# Patient Record
Sex: FEMALE | Race: WHITE | NOT HISPANIC OR LATINO | Employment: UNEMPLOYED | URBAN - METROPOLITAN AREA
[De-identification: names, ages, dates, MRNs, and addresses within clinical notes are randomized per-mention and may not be internally consistent; named-entity substitution may affect disease eponyms.]

---

## 2021-08-17 ENCOUNTER — TELEPHONE (OUTPATIENT)
Dept: FAMILY MEDICINE | Facility: CLINIC | Age: 46
End: 2021-08-17

## 2021-09-08 ENCOUNTER — OFFICE VISIT (OUTPATIENT)
Dept: FAMILY MEDICINE | Facility: CLINIC | Age: 46
End: 2021-09-08
Payer: OTHER GOVERNMENT

## 2021-09-08 VITALS
HEART RATE: 89 BPM | HEIGHT: 61 IN | WEIGHT: 151 LBS | SYSTOLIC BLOOD PRESSURE: 136 MMHG | RESPIRATION RATE: 16 BRPM | DIASTOLIC BLOOD PRESSURE: 87 MMHG | BODY MASS INDEX: 28.51 KG/M2 | TEMPERATURE: 98 F | OXYGEN SATURATION: 99 %

## 2021-09-08 DIAGNOSIS — N80.9 ENDOMETRIOSIS: ICD-10-CM

## 2021-09-08 DIAGNOSIS — F33.1 MODERATE EPISODE OF RECURRENT MAJOR DEPRESSIVE DISORDER: ICD-10-CM

## 2021-09-08 DIAGNOSIS — Z11.59 ENCOUNTER FOR HEPATITIS C SCREENING TEST FOR LOW RISK PATIENT: ICD-10-CM

## 2021-09-08 DIAGNOSIS — M41.9 SCOLIOSIS, UNSPECIFIED SCOLIOSIS TYPE, UNSPECIFIED SPINAL REGION: ICD-10-CM

## 2021-09-08 DIAGNOSIS — R53.83 FATIGUE, UNSPECIFIED TYPE: ICD-10-CM

## 2021-09-08 DIAGNOSIS — M25.552 LEFT HIP PAIN: ICD-10-CM

## 2021-09-08 DIAGNOSIS — Z11.4 ENCOUNTER FOR SCREENING FOR HIV: ICD-10-CM

## 2021-09-08 DIAGNOSIS — I10 ESSENTIAL HYPERTENSION: Primary | ICD-10-CM

## 2021-09-08 DIAGNOSIS — Z12.31 ENCOUNTER FOR SCREENING MAMMOGRAM FOR BREAST CANCER: ICD-10-CM

## 2021-09-08 DIAGNOSIS — L30.9 PERIORBITAL DERMATITIS: ICD-10-CM

## 2021-09-08 PROCEDURE — 99213 OFFICE O/P EST LOW 20 MIN: CPT | Mod: PBBFAC,PO | Performed by: INTERNAL MEDICINE

## 2021-09-08 PROCEDURE — 99204 PR OFFICE/OUTPT VISIT, NEW, LEVL IV, 45-59 MIN: ICD-10-PCS | Mod: S$PBB,,, | Performed by: INTERNAL MEDICINE

## 2021-09-08 PROCEDURE — 99999 PR PBB SHADOW E&M-EST. PATIENT-LVL III: CPT | Mod: PBBFAC,,, | Performed by: INTERNAL MEDICINE

## 2021-09-08 PROCEDURE — 99204 OFFICE O/P NEW MOD 45 MIN: CPT | Mod: S$PBB,,, | Performed by: INTERNAL MEDICINE

## 2021-09-08 PROCEDURE — 99999 PR PBB SHADOW E&M-EST. PATIENT-LVL III: ICD-10-PCS | Mod: PBBFAC,,, | Performed by: INTERNAL MEDICINE

## 2021-09-08 RX ORDER — LISINOPRIL 40 MG/1
40 TABLET ORAL DAILY
Qty: 90 TABLET | Refills: 1 | Status: SHIPPED | OUTPATIENT
Start: 2021-09-08 | End: 2021-09-09 | Stop reason: SDUPTHER

## 2021-09-08 RX ORDER — ESCITALOPRAM OXALATE 20 MG/1
20 TABLET ORAL DAILY
Qty: 90 TABLET | Refills: 1 | Status: SHIPPED | OUTPATIENT
Start: 2021-09-08 | End: 2021-09-09 | Stop reason: SDUPTHER

## 2021-09-08 RX ORDER — HYDROCHLOROTHIAZIDE 25 MG/1
TABLET ORAL
COMMUNITY
Start: 2018-09-08 | End: 2021-09-08 | Stop reason: SDUPTHER

## 2021-09-08 RX ORDER — HYDROCHLOROTHIAZIDE 25 MG/1
25 TABLET ORAL DAILY
Qty: 90 TABLET | Refills: 1 | Status: SHIPPED | OUTPATIENT
Start: 2021-09-08 | End: 2021-09-09 | Stop reason: SDUPTHER

## 2021-09-08 RX ORDER — LISINOPRIL 40 MG/1
TABLET ORAL
COMMUNITY
Start: 2010-09-08 | End: 2021-09-08 | Stop reason: SDUPTHER

## 2021-09-09 ENCOUNTER — PATIENT MESSAGE (OUTPATIENT)
Dept: FAMILY MEDICINE | Facility: CLINIC | Age: 46
End: 2021-09-09

## 2021-09-09 ENCOUNTER — HOSPITAL ENCOUNTER (OUTPATIENT)
Dept: RADIOLOGY | Facility: HOSPITAL | Age: 46
Discharge: HOME | End: 2021-09-09
Attending: INTERNAL MEDICINE
Payer: OTHER GOVERNMENT

## 2021-09-09 DIAGNOSIS — M25.552 LEFT HIP PAIN: ICD-10-CM

## 2021-09-09 DIAGNOSIS — I10 ESSENTIAL HYPERTENSION: ICD-10-CM

## 2021-09-09 DIAGNOSIS — F33.1 MODERATE EPISODE OF RECURRENT MAJOR DEPRESSIVE DISORDER: ICD-10-CM

## 2021-09-09 PROCEDURE — 73502 X-RAY EXAM HIP UNI 2-3 VIEWS: CPT | Mod: 26,LT,, | Performed by: RADIOLOGY

## 2021-09-09 PROCEDURE — 73502 XR HIP WITH PELVIS WHEN PERFORMED, 2 OR 3 VIEWS LEFT: ICD-10-PCS | Mod: 26,LT,, | Performed by: RADIOLOGY

## 2021-09-09 PROCEDURE — 73502 X-RAY EXAM HIP UNI 2-3 VIEWS: CPT | Mod: TC,FY,LT

## 2021-09-09 RX ORDER — HYDROCHLOROTHIAZIDE 25 MG/1
25 TABLET ORAL DAILY
Qty: 90 TABLET | Refills: 1 | Status: SHIPPED | OUTPATIENT
Start: 2021-09-09 | End: 2021-12-28

## 2021-09-09 RX ORDER — ESCITALOPRAM OXALATE 20 MG/1
20 TABLET ORAL DAILY
Qty: 90 TABLET | Refills: 1 | Status: SHIPPED | OUTPATIENT
Start: 2021-09-09 | End: 2021-12-28

## 2021-09-09 RX ORDER — LISINOPRIL 40 MG/1
40 TABLET ORAL DAILY
Qty: 90 TABLET | Refills: 1 | Status: SHIPPED | OUTPATIENT
Start: 2021-09-09 | End: 2021-12-28

## 2021-09-10 DIAGNOSIS — R74.01 ELEVATED TRANSAMINASE LEVEL: Primary | ICD-10-CM

## 2021-09-13 ENCOUNTER — PATIENT MESSAGE (OUTPATIENT)
Dept: FAMILY MEDICINE | Facility: CLINIC | Age: 46
End: 2021-09-13

## 2021-09-14 ENCOUNTER — HOSPITAL ENCOUNTER (OUTPATIENT)
Dept: RADIOLOGY | Facility: HOSPITAL | Age: 46
Discharge: HOME | End: 2021-09-14
Attending: INTERNAL MEDICINE
Payer: OTHER GOVERNMENT

## 2021-09-14 DIAGNOSIS — K76.89 HEPATIC CYST: Primary | ICD-10-CM

## 2021-09-14 DIAGNOSIS — R74.01 ELEVATED TRANSAMINASE LEVEL: ICD-10-CM

## 2021-09-14 DIAGNOSIS — K76.9 LIVER DISEASE, UNSPECIFIED: ICD-10-CM

## 2021-09-14 PROBLEM — K76.0 HEPATIC STEATOSIS: Status: ACTIVE | Noted: 2021-09-14

## 2021-09-14 PROCEDURE — 76700 US EXAM ABDOM COMPLETE: CPT | Mod: 26,,, | Performed by: RADIOLOGY

## 2021-09-14 PROCEDURE — 76700 US EXAM ABDOM COMPLETE: CPT | Mod: TC

## 2021-09-14 PROCEDURE — 76700 US ABDOMEN COMPLETE: ICD-10-PCS | Mod: 26,,, | Performed by: RADIOLOGY

## 2021-09-17 ENCOUNTER — PATIENT MESSAGE (OUTPATIENT)
Dept: FAMILY MEDICINE | Facility: CLINIC | Age: 46
End: 2021-09-17

## 2021-09-21 DIAGNOSIS — K76.89 HEPATIC CYST: ICD-10-CM

## 2021-09-21 DIAGNOSIS — K76.0 HEPATIC STEATOSIS: ICD-10-CM

## 2021-09-21 DIAGNOSIS — R76.8 POSITIVE ANA (ANTINUCLEAR ANTIBODY): ICD-10-CM

## 2021-09-21 DIAGNOSIS — R74.01 ELEVATED TRANSAMINASE LEVEL: Primary | ICD-10-CM

## 2021-09-23 ENCOUNTER — HOSPITAL ENCOUNTER (OUTPATIENT)
Dept: RADIOLOGY | Facility: HOSPITAL | Age: 46
Discharge: HOME | End: 2021-09-23
Attending: INTERNAL MEDICINE
Payer: OTHER GOVERNMENT

## 2021-09-23 DIAGNOSIS — K76.9 LIVER DISEASE, UNSPECIFIED: ICD-10-CM

## 2021-09-23 DIAGNOSIS — K76.89 HEPATIC CYST: ICD-10-CM

## 2021-09-23 PROCEDURE — 25500020 PHARM REV CODE 255: Performed by: INTERNAL MEDICINE

## 2021-09-23 PROCEDURE — 74160 CT ABDOMEN W/CONTRAST: CPT | Mod: TC

## 2021-09-23 PROCEDURE — 74160 CT ABDOMEN W/CONTRAST: CPT | Mod: 26,,, | Performed by: RADIOLOGY

## 2021-09-23 PROCEDURE — 74160 CT ABDOMEN WITH CONTRAST: ICD-10-PCS | Mod: 26,,, | Performed by: RADIOLOGY

## 2021-09-23 RX ADMIN — IOHEXOL 75 ML: 350 INJECTION, SOLUTION INTRAVENOUS at 04:09

## 2021-09-30 ENCOUNTER — PATIENT MESSAGE (OUTPATIENT)
Dept: FAMILY MEDICINE | Facility: CLINIC | Age: 46
End: 2021-09-30

## 2021-10-04 ENCOUNTER — PATIENT MESSAGE (OUTPATIENT)
Dept: ADMINISTRATIVE | Facility: HOSPITAL | Age: 46
End: 2021-10-04

## 2021-10-06 ENCOUNTER — HOSPITAL ENCOUNTER (OUTPATIENT)
Dept: RADIOLOGY | Facility: HOSPITAL | Age: 46
Discharge: HOME | End: 2021-10-06
Attending: INTERNAL MEDICINE
Payer: OTHER GOVERNMENT

## 2021-10-06 VITALS — BODY MASS INDEX: 28.51 KG/M2 | WEIGHT: 151 LBS | HEIGHT: 61 IN

## 2021-10-06 DIAGNOSIS — Z12.31 ENCOUNTER FOR SCREENING MAMMOGRAM FOR BREAST CANCER: ICD-10-CM

## 2021-10-06 PROCEDURE — 77067 SCR MAMMO BI INCL CAD: CPT | Mod: TC

## 2021-10-06 PROCEDURE — 77067 MAMMO DIGITAL SCREENING BILAT: ICD-10-PCS | Mod: 26,,, | Performed by: RADIOLOGY

## 2021-10-06 PROCEDURE — 77067 SCR MAMMO BI INCL CAD: CPT | Mod: 26,,, | Performed by: RADIOLOGY

## 2021-10-13 ENCOUNTER — TELEPHONE (OUTPATIENT)
Dept: GASTROENTEROLOGY | Facility: CLINIC | Age: 46
End: 2021-10-13

## 2021-11-15 ENCOUNTER — LAB VISIT (OUTPATIENT)
Dept: LAB | Facility: HOSPITAL | Age: 46
End: 2021-11-15
Attending: INTERNAL MEDICINE
Payer: OTHER GOVERNMENT

## 2021-11-15 ENCOUNTER — OFFICE VISIT (OUTPATIENT)
Dept: GASTROENTEROLOGY | Facility: CLINIC | Age: 46
End: 2021-11-15
Payer: OTHER GOVERNMENT

## 2021-11-15 ENCOUNTER — TELEPHONE (OUTPATIENT)
Dept: GASTROENTEROLOGY | Facility: CLINIC | Age: 46
End: 2021-11-15
Payer: OTHER GOVERNMENT

## 2021-11-15 VITALS — WEIGHT: 151 LBS | BODY MASS INDEX: 28.51 KG/M2 | HEIGHT: 61 IN

## 2021-11-15 DIAGNOSIS — R76.8 POSITIVE ANA (ANTINUCLEAR ANTIBODY): ICD-10-CM

## 2021-11-15 DIAGNOSIS — R74.01 ELEVATED TRANSAMINASE LEVEL: ICD-10-CM

## 2021-11-15 DIAGNOSIS — K76.89 HEPATIC CYST: ICD-10-CM

## 2021-11-15 LAB
IGA SERPL-MCNC: 168 MG/DL (ref 40–350)
IGG SERPL-MCNC: 968 MG/DL (ref 650–1600)
IGM SERPL-MCNC: 138 MG/DL (ref 50–300)
IRON SERPL-MCNC: 78 UG/DL (ref 30–160)
SATURATED IRON: 21 % (ref 20–50)
TOTAL IRON BINDING CAPACITY: 373 UG/DL (ref 250–450)
TRANSFERRIN SERPL-MCNC: 252 MG/DL (ref 200–375)

## 2021-11-15 PROCEDURE — 82784 ASSAY IGA/IGD/IGG/IGM EACH: CPT | Performed by: INTERNAL MEDICINE

## 2021-11-15 PROCEDURE — 99203 OFFICE O/P NEW LOW 30 MIN: CPT | Mod: S$PBB,,, | Performed by: INTERNAL MEDICINE

## 2021-11-15 PROCEDURE — 36415 COLL VENOUS BLD VENIPUNCTURE: CPT | Performed by: INTERNAL MEDICINE

## 2021-11-15 PROCEDURE — 84466 ASSAY OF TRANSFERRIN: CPT | Performed by: INTERNAL MEDICINE

## 2021-11-15 PROCEDURE — 99203 PR OFFICE/OUTPT VISIT, NEW, LEVL III, 30-44 MIN: ICD-10-PCS | Mod: S$PBB,,, | Performed by: INTERNAL MEDICINE

## 2021-11-15 PROCEDURE — 99999 PR PBB SHADOW E&M-EST. PATIENT-LVL III: ICD-10-PCS | Mod: PBBFAC,,, | Performed by: INTERNAL MEDICINE

## 2021-11-15 PROCEDURE — 99213 OFFICE O/P EST LOW 20 MIN: CPT | Mod: PBBFAC,PO | Performed by: INTERNAL MEDICINE

## 2021-11-15 PROCEDURE — 82787 IGG 1 2 3 OR 4 EACH: CPT | Mod: 59 | Performed by: INTERNAL MEDICINE

## 2021-11-15 PROCEDURE — 99999 PR PBB SHADOW E&M-EST. PATIENT-LVL III: CPT | Mod: PBBFAC,,, | Performed by: INTERNAL MEDICINE

## 2021-11-15 PROCEDURE — 86790 VIRUS ANTIBODY NOS: CPT | Performed by: INTERNAL MEDICINE

## 2021-11-15 PROCEDURE — 86255 FLUORESCENT ANTIBODY SCREEN: CPT | Mod: 91 | Performed by: INTERNAL MEDICINE

## 2021-11-15 PROCEDURE — 82103 ALPHA-1-ANTITRYPSIN TOTAL: CPT | Performed by: INTERNAL MEDICINE

## 2021-11-15 PROCEDURE — 86235 NUCLEAR ANTIGEN ANTIBODY: CPT | Performed by: INTERNAL MEDICINE

## 2021-11-16 LAB
A1AT SERPL-MCNC: 151 MG/DL (ref 100–190)
HEPATITIS A ANTIBODY, IGG: NEGATIVE

## 2021-11-17 ENCOUNTER — OFFICE VISIT (OUTPATIENT)
Dept: RHEUMATOLOGY | Facility: CLINIC | Age: 46
End: 2021-11-17
Payer: OTHER GOVERNMENT

## 2021-11-17 VITALS
WEIGHT: 153.25 LBS | OXYGEN SATURATION: 98 % | BODY MASS INDEX: 28.93 KG/M2 | SYSTOLIC BLOOD PRESSURE: 131 MMHG | HEART RATE: 70 BPM | DIASTOLIC BLOOD PRESSURE: 79 MMHG | TEMPERATURE: 99 F | HEIGHT: 61 IN | RESPIRATION RATE: 18 BRPM

## 2021-11-17 DIAGNOSIS — R76.8 POSITIVE ANA (ANTINUCLEAR ANTIBODY): ICD-10-CM

## 2021-11-17 DIAGNOSIS — Z71.89 COUNSELING AND COORDINATION OF CARE: ICD-10-CM

## 2021-11-17 DIAGNOSIS — K75.4 AUTOIMMUNE HEPATITIS: Primary | ICD-10-CM

## 2021-11-17 DIAGNOSIS — Z79.52 LONG TERM SYSTEMIC STEROID USER: ICD-10-CM

## 2021-11-17 DIAGNOSIS — M15.9 PRIMARY OSTEOARTHRITIS INVOLVING MULTIPLE JOINTS: ICD-10-CM

## 2021-11-17 LAB — MITOCHONDRIA AB TITR SER IF: NORMAL {TITER}

## 2021-11-17 PROCEDURE — 99213 OFFICE O/P EST LOW 20 MIN: CPT | Mod: PBBFAC,PN | Performed by: INTERNAL MEDICINE

## 2021-11-17 PROCEDURE — 99204 PR OFFICE/OUTPT VISIT, NEW, LEVL IV, 45-59 MIN: ICD-10-PCS | Mod: S$PBB,,, | Performed by: INTERNAL MEDICINE

## 2021-11-17 PROCEDURE — 99999 PR PBB SHADOW E&M-EST. PATIENT-LVL III: CPT | Mod: PBBFAC,,, | Performed by: INTERNAL MEDICINE

## 2021-11-17 PROCEDURE — 99204 OFFICE O/P NEW MOD 45 MIN: CPT | Mod: S$PBB,,, | Performed by: INTERNAL MEDICINE

## 2021-11-17 PROCEDURE — 99999 PR PBB SHADOW E&M-EST. PATIENT-LVL III: ICD-10-PCS | Mod: PBBFAC,,, | Performed by: INTERNAL MEDICINE

## 2021-11-17 RX ORDER — PANTOPRAZOLE SODIUM 40 MG/1
40 TABLET, DELAYED RELEASE ORAL DAILY
Qty: 30 TABLET | Refills: 11 | Status: SHIPPED | OUTPATIENT
Start: 2021-11-17 | End: 2021-12-14 | Stop reason: SDUPTHER

## 2021-11-17 RX ORDER — PREDNISONE 20 MG/1
20 TABLET ORAL DAILY
Qty: 30 TABLET | Refills: 3 | Status: SHIPPED | OUTPATIENT
Start: 2021-11-17 | End: 2021-12-14 | Stop reason: SDUPTHER

## 2021-11-17 RX ORDER — MULTIVITAMIN
1 TABLET ORAL 2 TIMES DAILY
Qty: 60 TABLET | Refills: 3 | Status: SHIPPED | OUTPATIENT
Start: 2021-11-17 | End: 2021-12-14 | Stop reason: SDUPTHER

## 2021-11-17 RX ORDER — SULFAMETHOXAZOLE AND TRIMETHOPRIM 800; 160 MG/1; MG/1
1 TABLET ORAL EVERY OTHER DAY
Qty: 15 TABLET | Refills: 3 | Status: SHIPPED | OUTPATIENT
Start: 2021-11-17 | End: 2021-12-14 | Stop reason: SDUPTHER

## 2021-11-18 LAB
ANCA AB TITR SER IF: NORMAL TITER
IGG1 SER-MCNC: 494 MG/DL (ref 382–929)
IGG2 SER-MCNC: 344 MG/DL (ref 242–700)
IGG3 SER-MCNC: 31 MG/DL (ref 22–176)
IGG4 SER-MCNC: 3 MG/DL (ref 4–86)
P-ANCA TITR SER IF: NORMAL TITER

## 2021-11-22 ENCOUNTER — PATIENT MESSAGE (OUTPATIENT)
Dept: GASTROENTEROLOGY | Facility: HOSPITAL | Age: 46
End: 2021-11-22
Payer: OTHER GOVERNMENT

## 2021-11-22 DIAGNOSIS — R74.01 ELEVATED TRANSAMINASE LEVEL: Primary | ICD-10-CM

## 2021-11-24 ENCOUNTER — PATIENT MESSAGE (OUTPATIENT)
Dept: RHEUMATOLOGY | Facility: CLINIC | Age: 46
End: 2021-11-24
Payer: OTHER GOVERNMENT

## 2021-12-01 ENCOUNTER — OFFICE VISIT (OUTPATIENT)
Dept: URGENT CARE | Facility: CLINIC | Age: 46
End: 2021-12-01
Payer: OTHER GOVERNMENT

## 2021-12-01 VITALS
TEMPERATURE: 100 F | SYSTOLIC BLOOD PRESSURE: 146 MMHG | DIASTOLIC BLOOD PRESSURE: 86 MMHG | HEIGHT: 61 IN | OXYGEN SATURATION: 99 % | RESPIRATION RATE: 16 BRPM | HEART RATE: 99 BPM | WEIGHT: 150 LBS | BODY MASS INDEX: 28.32 KG/M2

## 2021-12-01 DIAGNOSIS — Z20.822 ENCOUNTER FOR LABORATORY TESTING FOR COVID-19 VIRUS: ICD-10-CM

## 2021-12-01 DIAGNOSIS — U07.1 COVID-19: Primary | ICD-10-CM

## 2021-12-01 LAB
CTP QC/QA: YES
SARS-COV-2 RDRP RESP QL NAA+PROBE: POSITIVE

## 2021-12-01 PROCEDURE — U0002: ICD-10-PCS | Mod: QW,CR,S$GLB,

## 2021-12-01 PROCEDURE — 99213 PR OFFICE/OUTPT VISIT, EST, LEVL III, 20-29 MIN: ICD-10-PCS | Mod: S$GLB,CS,,

## 2021-12-01 PROCEDURE — U0002 COVID-19 LAB TEST NON-CDC: HCPCS | Mod: QW,CR,S$GLB,

## 2021-12-01 PROCEDURE — 99213 OFFICE O/P EST LOW 20 MIN: CPT | Mod: S$GLB,CS,,

## 2021-12-01 RX ORDER — ALBUTEROL SULFATE 90 UG/1
2 AEROSOL, METERED RESPIRATORY (INHALATION) EVERY 6 HOURS PRN
Qty: 18 G | Refills: 0 | Status: SHIPPED | OUTPATIENT
Start: 2021-12-01 | End: 2021-12-01 | Stop reason: CLARIF

## 2021-12-01 RX ORDER — PROMETHAZINE HYDROCHLORIDE AND DEXTROMETHORPHAN HYDROBROMIDE 6.25; 15 MG/5ML; MG/5ML
5 SYRUP ORAL NIGHTLY PRN
Qty: 180 ML | Refills: 0 | Status: SHIPPED | OUTPATIENT
Start: 2021-12-01 | End: 2021-12-11

## 2021-12-01 RX ORDER — BENZONATATE 100 MG/1
200 CAPSULE ORAL 3 TIMES DAILY PRN
Qty: 60 CAPSULE | Refills: 1 | Status: SHIPPED | OUTPATIENT
Start: 2021-12-01 | End: 2021-12-20

## 2021-12-02 ENCOUNTER — INFUSION (OUTPATIENT)
Dept: INFECTIOUS DISEASES | Facility: HOSPITAL | Age: 46
End: 2021-12-02
Payer: OTHER GOVERNMENT

## 2021-12-02 VITALS
DIASTOLIC BLOOD PRESSURE: 72 MMHG | HEART RATE: 88 BPM | OXYGEN SATURATION: 99 % | HEIGHT: 61 IN | TEMPERATURE: 98 F | WEIGHT: 150 LBS | SYSTOLIC BLOOD PRESSURE: 116 MMHG | BODY MASS INDEX: 28.32 KG/M2 | RESPIRATION RATE: 16 BRPM

## 2021-12-02 DIAGNOSIS — U07.1 COVID-19: Primary | ICD-10-CM

## 2021-12-02 PROCEDURE — 25000003 PHARM REV CODE 250: Performed by: INTERNAL MEDICINE

## 2021-12-02 PROCEDURE — 63600175 PHARM REV CODE 636 W HCPCS: Performed by: INTERNAL MEDICINE

## 2021-12-02 PROCEDURE — M0243 CASIRIVI AND IMDEVI INFUSION: HCPCS | Performed by: INTERNAL MEDICINE

## 2021-12-02 RX ORDER — ACETAMINOPHEN 325 MG/1
650 TABLET ORAL ONCE AS NEEDED
Status: DISCONTINUED | OUTPATIENT
Start: 2021-12-02 | End: 2022-04-12

## 2021-12-02 RX ORDER — SODIUM CHLORIDE 0.9 % (FLUSH) 0.9 %
10 SYRINGE (ML) INJECTION
Status: DISCONTINUED | OUTPATIENT
Start: 2021-12-02 | End: 2022-04-12

## 2021-12-02 RX ORDER — ALBUTEROL SULFATE 90 UG/1
2 AEROSOL, METERED RESPIRATORY (INHALATION)
Status: DISCONTINUED | OUTPATIENT
Start: 2021-12-02 | End: 2022-04-12

## 2021-12-02 RX ORDER — DIPHENHYDRAMINE HYDROCHLORIDE 50 MG/ML
25 INJECTION INTRAMUSCULAR; INTRAVENOUS ONCE AS NEEDED
Status: DISCONTINUED | OUTPATIENT
Start: 2021-12-02 | End: 2022-04-12

## 2021-12-02 RX ORDER — ONDANSETRON 4 MG/1
4 TABLET, ORALLY DISINTEGRATING ORAL ONCE AS NEEDED
Status: DISCONTINUED | OUTPATIENT
Start: 2021-12-02 | End: 2022-04-12

## 2021-12-02 RX ORDER — EPINEPHRINE 0.3 MG/.3ML
0.3 INJECTION SUBCUTANEOUS
Status: DISCONTINUED | OUTPATIENT
Start: 2021-12-02 | End: 2022-04-12

## 2021-12-02 RX ADMIN — CASIRIVIMAB AND IMDEVIMAB 600 MG: 600; 600 INJECTION, SOLUTION, CONCENTRATE INTRAVENOUS at 01:12

## 2021-12-14 ENCOUNTER — OFFICE VISIT (OUTPATIENT)
Dept: RHEUMATOLOGY | Facility: CLINIC | Age: 46
End: 2021-12-14
Payer: OTHER GOVERNMENT

## 2021-12-14 ENCOUNTER — LAB VISIT (OUTPATIENT)
Dept: LAB | Facility: HOSPITAL | Age: 46
End: 2021-12-14
Attending: INTERNAL MEDICINE
Payer: OTHER GOVERNMENT

## 2021-12-14 VITALS
WEIGHT: 149.94 LBS | HEART RATE: 80 BPM | RESPIRATION RATE: 18 BRPM | TEMPERATURE: 99 F | BODY MASS INDEX: 28.31 KG/M2 | OXYGEN SATURATION: 97 % | DIASTOLIC BLOOD PRESSURE: 82 MMHG | HEIGHT: 61 IN | SYSTOLIC BLOOD PRESSURE: 123 MMHG

## 2021-12-14 DIAGNOSIS — K75.4 AUTOIMMUNE HEPATITIS TREATED WITH STEROIDS: ICD-10-CM

## 2021-12-14 DIAGNOSIS — K75.4 AUTOIMMUNE HEPATITIS TREATED WITH STEROIDS: Primary | ICD-10-CM

## 2021-12-14 DIAGNOSIS — Z79.52 LONG TERM SYSTEMIC STEROID USER: ICD-10-CM

## 2021-12-14 DIAGNOSIS — Z71.89 COUNSELING AND COORDINATION OF CARE: ICD-10-CM

## 2021-12-14 DIAGNOSIS — R76.8 POSITIVE ANA (ANTINUCLEAR ANTIBODY): ICD-10-CM

## 2021-12-14 DIAGNOSIS — M15.9 PRIMARY OSTEOARTHRITIS INVOLVING MULTIPLE JOINTS: ICD-10-CM

## 2021-12-14 LAB
ALBUMIN SERPL BCP-MCNC: 4.2 G/DL (ref 3.5–5.2)
ALP SERPL-CCNC: 48 U/L (ref 55–135)
ALT SERPL W/O P-5'-P-CCNC: 21 U/L (ref 10–44)
ANION GAP SERPL CALC-SCNC: 9 MMOL/L (ref 8–16)
AST SERPL-CCNC: 19 U/L (ref 10–40)
BASOPHILS # BLD AUTO: 0.04 K/UL (ref 0–0.2)
BASOPHILS NFR BLD: 0.4 % (ref 0–1.9)
BILIRUB SERPL-MCNC: 0.5 MG/DL (ref 0.1–1)
BUN SERPL-MCNC: 15 MG/DL (ref 6–20)
CALCIUM SERPL-MCNC: 9.7 MG/DL (ref 8.7–10.5)
CHLORIDE SERPL-SCNC: 102 MMOL/L (ref 95–110)
CO2 SERPL-SCNC: 28 MMOL/L (ref 23–29)
CREAT SERPL-MCNC: 0.8 MG/DL (ref 0.5–1.4)
DIFFERENTIAL METHOD: ABNORMAL
EOSINOPHIL # BLD AUTO: 0 K/UL (ref 0–0.5)
EOSINOPHIL NFR BLD: 0.2 % (ref 0–8)
ERYTHROCYTE [DISTWIDTH] IN BLOOD BY AUTOMATED COUNT: 13.7 % (ref 11.5–14.5)
EST. GFR  (AFRICAN AMERICAN): >60 ML/MIN/1.73 M^2
EST. GFR  (NON AFRICAN AMERICAN): >60 ML/MIN/1.73 M^2
GLUCOSE SERPL-MCNC: 106 MG/DL (ref 70–110)
HCT VFR BLD AUTO: 38.1 % (ref 37–48.5)
HGB BLD-MCNC: 12.3 G/DL (ref 12–16)
IMM GRANULOCYTES # BLD AUTO: 0.05 K/UL (ref 0–0.04)
IMM GRANULOCYTES NFR BLD AUTO: 0.5 % (ref 0–0.5)
LYMPHOCYTES # BLD AUTO: 1.3 K/UL (ref 1–4.8)
LYMPHOCYTES NFR BLD: 14.3 % (ref 18–48)
MCH RBC QN AUTO: 29.3 PG (ref 27–31)
MCHC RBC AUTO-ENTMCNC: 32.3 G/DL (ref 32–36)
MCV RBC AUTO: 91 FL (ref 82–98)
MONOCYTES # BLD AUTO: 0.4 K/UL (ref 0.3–1)
MONOCYTES NFR BLD: 4.6 % (ref 4–15)
NEUTROPHILS # BLD AUTO: 7.4 K/UL (ref 1.8–7.7)
NEUTROPHILS NFR BLD: 80 % (ref 38–73)
NRBC BLD-RTO: 0 /100 WBC
PLATELET # BLD AUTO: 284 K/UL (ref 150–450)
PMV BLD AUTO: 9.9 FL (ref 9.2–12.9)
POTASSIUM SERPL-SCNC: 4.5 MMOL/L (ref 3.5–5.1)
PROT SERPL-MCNC: 7 G/DL (ref 6–8.4)
RBC # BLD AUTO: 4.2 M/UL (ref 4–5.4)
SODIUM SERPL-SCNC: 139 MMOL/L (ref 136–145)
WBC # BLD AUTO: 9.27 K/UL (ref 3.9–12.7)

## 2021-12-14 PROCEDURE — 99999 PR PBB SHADOW E&M-EST. PATIENT-LVL III: ICD-10-PCS | Mod: PBBFAC,,, | Performed by: INTERNAL MEDICINE

## 2021-12-14 PROCEDURE — 99214 PR OFFICE/OUTPT VISIT, EST, LEVL IV, 30-39 MIN: ICD-10-PCS | Mod: S$PBB,,, | Performed by: INTERNAL MEDICINE

## 2021-12-14 PROCEDURE — 80053 COMPREHEN METABOLIC PANEL: CPT | Performed by: INTERNAL MEDICINE

## 2021-12-14 PROCEDURE — 99999 PR PBB SHADOW E&M-EST. PATIENT-LVL III: CPT | Mod: PBBFAC,,, | Performed by: INTERNAL MEDICINE

## 2021-12-14 PROCEDURE — 85025 COMPLETE CBC W/AUTO DIFF WBC: CPT | Performed by: INTERNAL MEDICINE

## 2021-12-14 PROCEDURE — 36415 COLL VENOUS BLD VENIPUNCTURE: CPT | Mod: PN | Performed by: INTERNAL MEDICINE

## 2021-12-14 PROCEDURE — 99214 OFFICE O/P EST MOD 30 MIN: CPT | Mod: S$PBB,,, | Performed by: INTERNAL MEDICINE

## 2021-12-14 PROCEDURE — 99213 OFFICE O/P EST LOW 20 MIN: CPT | Mod: PBBFAC,PN | Performed by: INTERNAL MEDICINE

## 2021-12-14 RX ORDER — MULTIVITAMIN
1 TABLET ORAL 2 TIMES DAILY
Qty: 60 TABLET | Refills: 3 | Status: SHIPPED | OUTPATIENT
Start: 2021-12-14 | End: 2023-04-12

## 2021-12-14 RX ORDER — PREDNISONE 20 MG/1
20 TABLET ORAL DAILY
Qty: 30 TABLET | Refills: 3 | Status: SHIPPED | OUTPATIENT
Start: 2021-12-14 | End: 2021-12-30

## 2021-12-14 RX ORDER — PANTOPRAZOLE SODIUM 40 MG/1
40 TABLET, DELAYED RELEASE ORAL DAILY
Qty: 30 TABLET | Refills: 11 | Status: SHIPPED | OUTPATIENT
Start: 2021-12-14 | End: 2022-04-12

## 2021-12-14 RX ORDER — SULFAMETHOXAZOLE AND TRIMETHOPRIM 800; 160 MG/1; MG/1
1 TABLET ORAL EVERY OTHER DAY
Qty: 15 TABLET | Refills: 3 | Status: SHIPPED | OUTPATIENT
Start: 2021-12-14 | End: 2022-04-12 | Stop reason: ALTCHOICE

## 2021-12-15 ENCOUNTER — PATIENT MESSAGE (OUTPATIENT)
Dept: RHEUMATOLOGY | Facility: CLINIC | Age: 46
End: 2021-12-15
Payer: OTHER GOVERNMENT

## 2021-12-20 ENCOUNTER — OFFICE VISIT (OUTPATIENT)
Dept: HEPATOLOGY | Facility: CLINIC | Age: 46
End: 2021-12-20
Payer: OTHER GOVERNMENT

## 2021-12-20 VITALS
HEIGHT: 61 IN | RESPIRATION RATE: 18 BRPM | DIASTOLIC BLOOD PRESSURE: 73 MMHG | TEMPERATURE: 98 F | WEIGHT: 155.44 LBS | BODY MASS INDEX: 29.35 KG/M2 | SYSTOLIC BLOOD PRESSURE: 131 MMHG | HEART RATE: 102 BPM | OXYGEN SATURATION: 100 %

## 2021-12-20 DIAGNOSIS — K76.0 FATTY LIVER: ICD-10-CM

## 2021-12-20 DIAGNOSIS — R74.01 ELEVATED TRANSAMINASE LEVEL: Primary | ICD-10-CM

## 2021-12-20 DIAGNOSIS — K76.89 HEPATIC CYST: ICD-10-CM

## 2021-12-20 PROCEDURE — 99999 PR PBB SHADOW E&M-EST. PATIENT-LVL IV: CPT | Mod: PBBFAC,,, | Performed by: NURSE PRACTITIONER

## 2021-12-20 PROCEDURE — 99999 PR PBB SHADOW E&M-EST. PATIENT-LVL IV: ICD-10-PCS | Mod: PBBFAC,,, | Performed by: NURSE PRACTITIONER

## 2021-12-20 PROCEDURE — 99215 PR OFFICE/OUTPT VISIT, EST, LEVL V, 40-54 MIN: ICD-10-PCS | Mod: S$PBB,,, | Performed by: NURSE PRACTITIONER

## 2021-12-20 PROCEDURE — 99214 OFFICE O/P EST MOD 30 MIN: CPT | Mod: PBBFAC | Performed by: NURSE PRACTITIONER

## 2021-12-20 PROCEDURE — 99215 OFFICE O/P EST HI 40 MIN: CPT | Mod: S$PBB,,, | Performed by: NURSE PRACTITIONER

## 2021-12-30 ENCOUNTER — PATIENT MESSAGE (OUTPATIENT)
Dept: HEPATOLOGY | Facility: CLINIC | Age: 46
End: 2021-12-30
Payer: OTHER GOVERNMENT

## 2021-12-30 ENCOUNTER — TELEPHONE (OUTPATIENT)
Dept: HEPATOLOGY | Facility: CLINIC | Age: 46
End: 2021-12-30
Payer: OTHER GOVERNMENT

## 2021-12-30 DIAGNOSIS — R76.8 POSITIVE ANA (ANTINUCLEAR ANTIBODY): ICD-10-CM

## 2021-12-30 DIAGNOSIS — R74.8 ELEVATED LIVER ENZYMES: Primary | ICD-10-CM

## 2021-12-30 RX ORDER — PREDNISONE 20 MG/1
10 TABLET ORAL DAILY
Start: 2021-12-30 | End: 2022-01-10 | Stop reason: DRUGHIGH

## 2022-01-07 ENCOUNTER — LAB VISIT (OUTPATIENT)
Dept: LAB | Facility: HOSPITAL | Age: 47
End: 2022-01-07
Attending: NURSE PRACTITIONER
Payer: OTHER GOVERNMENT

## 2022-01-07 ENCOUNTER — PATIENT MESSAGE (OUTPATIENT)
Dept: FAMILY MEDICINE | Facility: CLINIC | Age: 47
End: 2022-01-07
Payer: OTHER GOVERNMENT

## 2022-01-07 DIAGNOSIS — R74.8 ELEVATED LIVER ENZYMES: ICD-10-CM

## 2022-01-07 LAB
ALBUMIN SERPL BCP-MCNC: 4 G/DL (ref 3.5–5.2)
ALP SERPL-CCNC: 47 U/L (ref 55–135)
ALT SERPL W/O P-5'-P-CCNC: 24 U/L (ref 10–44)
ANION GAP SERPL CALC-SCNC: 10 MMOL/L (ref 8–16)
AST SERPL-CCNC: 20 U/L (ref 10–40)
BASOPHILS # BLD AUTO: 0.04 K/UL (ref 0–0.2)
BASOPHILS NFR BLD: 0.6 % (ref 0–1.9)
BILIRUB SERPL-MCNC: 0.3 MG/DL (ref 0.1–1)
BUN SERPL-MCNC: 14 MG/DL (ref 6–20)
CALCIUM SERPL-MCNC: 9.5 MG/DL (ref 8.7–10.5)
CHLORIDE SERPL-SCNC: 104 MMOL/L (ref 95–110)
CO2 SERPL-SCNC: 28 MMOL/L (ref 23–29)
CREAT SERPL-MCNC: 0.9 MG/DL (ref 0.5–1.4)
DIFFERENTIAL METHOD: ABNORMAL
EOSINOPHIL # BLD AUTO: 0.1 K/UL (ref 0–0.5)
EOSINOPHIL NFR BLD: 2 % (ref 0–8)
ERYTHROCYTE [DISTWIDTH] IN BLOOD BY AUTOMATED COUNT: 14.6 % (ref 11.5–14.5)
EST. GFR  (AFRICAN AMERICAN): >60 ML/MIN/1.73 M^2
EST. GFR  (NON AFRICAN AMERICAN): >60 ML/MIN/1.73 M^2
GLUCOSE SERPL-MCNC: 90 MG/DL (ref 70–110)
HCT VFR BLD AUTO: 35.8 % (ref 37–48.5)
HGB BLD-MCNC: 11.4 G/DL (ref 12–16)
IMM GRANULOCYTES # BLD AUTO: 0.07 K/UL (ref 0–0.04)
IMM GRANULOCYTES NFR BLD AUTO: 1 % (ref 0–0.5)
INR PPP: 1 (ref 0.8–1.2)
LYMPHOCYTES # BLD AUTO: 2.6 K/UL (ref 1–4.8)
LYMPHOCYTES NFR BLD: 37 % (ref 18–48)
MCH RBC QN AUTO: 30.6 PG (ref 27–31)
MCHC RBC AUTO-ENTMCNC: 31.8 G/DL (ref 32–36)
MCV RBC AUTO: 96 FL (ref 82–98)
MONOCYTES # BLD AUTO: 0.7 K/UL (ref 0.3–1)
MONOCYTES NFR BLD: 9.4 % (ref 4–15)
NEUTROPHILS # BLD AUTO: 3.4 K/UL (ref 1.8–7.7)
NEUTROPHILS NFR BLD: 50 % (ref 38–73)
NRBC BLD-RTO: 0 /100 WBC
PLATELET # BLD AUTO: 218 K/UL (ref 150–450)
PMV BLD AUTO: 9.9 FL (ref 9.2–12.9)
POTASSIUM SERPL-SCNC: 3.7 MMOL/L (ref 3.5–5.1)
PROT SERPL-MCNC: 6.6 G/DL (ref 6–8.4)
PROTHROMBIN TIME: 10.3 SEC (ref 9–12.5)
RBC # BLD AUTO: 3.73 M/UL (ref 4–5.4)
SODIUM SERPL-SCNC: 142 MMOL/L (ref 136–145)
WBC # BLD AUTO: 6.89 K/UL (ref 3.9–12.7)

## 2022-01-07 PROCEDURE — 85610 PROTHROMBIN TIME: CPT | Performed by: NURSE PRACTITIONER

## 2022-01-07 PROCEDURE — 80053 COMPREHEN METABOLIC PANEL: CPT | Performed by: NURSE PRACTITIONER

## 2022-01-07 PROCEDURE — 36415 COLL VENOUS BLD VENIPUNCTURE: CPT | Mod: PN | Performed by: NURSE PRACTITIONER

## 2022-01-07 PROCEDURE — 85025 COMPLETE CBC W/AUTO DIFF WBC: CPT | Performed by: NURSE PRACTITIONER

## 2022-01-10 ENCOUNTER — PATIENT MESSAGE (OUTPATIENT)
Dept: HEPATOLOGY | Facility: CLINIC | Age: 47
End: 2022-01-10
Payer: OTHER GOVERNMENT

## 2022-01-10 ENCOUNTER — TELEPHONE (OUTPATIENT)
Dept: HEPATOLOGY | Facility: CLINIC | Age: 47
End: 2022-01-10
Payer: OTHER GOVERNMENT

## 2022-01-10 DIAGNOSIS — R74.8 ELEVATED LIVER ENZYMES: Primary | ICD-10-CM

## 2022-01-10 RX ORDER — PREDNISONE 5 MG/1
5 TABLET ORAL DAILY
Qty: 30 TABLET | Refills: 1 | Status: SHIPPED | OUTPATIENT
Start: 2022-01-10 | End: 2022-04-12 | Stop reason: ALTCHOICE

## 2022-01-10 NOTE — TELEPHONE ENCOUNTER
Patient notified of results and recommendations via [facility] portal.     Liver enzymes remain normal, will decrease prednisone from 10 mg to 5 mg daily. New Rx sent. Repeating labs in 2 weeks.

## 2022-01-11 ENCOUNTER — PATIENT MESSAGE (OUTPATIENT)
Dept: FAMILY MEDICINE | Facility: CLINIC | Age: 47
End: 2022-01-11
Payer: OTHER GOVERNMENT

## 2022-01-11 DIAGNOSIS — B37.9 YEAST INFECTION: Primary | ICD-10-CM

## 2022-01-11 RX ORDER — FLUCONAZOLE 150 MG/1
150 TABLET ORAL DAILY
Qty: 1 TABLET | Refills: 0 | Status: SHIPPED | OUTPATIENT
Start: 2022-01-11 | End: 2022-01-12

## 2022-01-25 ENCOUNTER — LAB VISIT (OUTPATIENT)
Dept: LAB | Facility: HOSPITAL | Age: 47
End: 2022-01-25
Attending: NURSE PRACTITIONER
Payer: OTHER GOVERNMENT

## 2022-01-25 DIAGNOSIS — R74.8 ELEVATED LIVER ENZYMES: ICD-10-CM

## 2022-01-25 LAB
ALBUMIN SERPL BCP-MCNC: 4.4 G/DL (ref 3.5–5.2)
ALP SERPL-CCNC: 46 U/L (ref 55–135)
ALT SERPL W/O P-5'-P-CCNC: 29 U/L (ref 10–44)
AST SERPL-CCNC: 23 U/L (ref 10–40)
BILIRUB DIRECT SERPL-MCNC: 0.2 MG/DL (ref 0.1–0.3)
BILIRUB SERPL-MCNC: 0.6 MG/DL (ref 0.1–1)
PROT SERPL-MCNC: 7.1 G/DL (ref 6–8.4)

## 2022-01-25 PROCEDURE — 80076 HEPATIC FUNCTION PANEL: CPT | Performed by: NURSE PRACTITIONER

## 2022-01-25 PROCEDURE — 36415 COLL VENOUS BLD VENIPUNCTURE: CPT | Mod: PN | Performed by: NURSE PRACTITIONER

## 2022-01-26 ENCOUNTER — TELEPHONE (OUTPATIENT)
Dept: HEPATOLOGY | Facility: CLINIC | Age: 47
End: 2022-01-26
Payer: OTHER GOVERNMENT

## 2022-01-26 DIAGNOSIS — R76.8 POSITIVE ANA (ANTINUCLEAR ANTIBODY): ICD-10-CM

## 2022-01-26 DIAGNOSIS — K76.0 FATTY LIVER: ICD-10-CM

## 2022-01-26 DIAGNOSIS — R74.8 ELEVATED LIVER ENZYMES: Primary | ICD-10-CM

## 2022-01-26 DIAGNOSIS — K76.89 HEPATIC CYST: ICD-10-CM

## 2022-01-26 NOTE — TELEPHONE ENCOUNTER
TILA Benitez Staff  Please contact patient and arrange repeat Hepatic Function Panel in 2 weeks.     Thanks!   Left a voice message that she has a new lab order scheduled for the same day as a dr's appt, on 2/8

## 2022-02-01 ENCOUNTER — PATIENT MESSAGE (OUTPATIENT)
Dept: FAMILY MEDICINE | Facility: CLINIC | Age: 47
End: 2022-02-01
Payer: OTHER GOVERNMENT

## 2022-02-08 ENCOUNTER — OFFICE VISIT (OUTPATIENT)
Dept: RHEUMATOLOGY | Facility: CLINIC | Age: 47
End: 2022-02-08
Payer: OTHER GOVERNMENT

## 2022-02-08 ENCOUNTER — LAB VISIT (OUTPATIENT)
Dept: LAB | Facility: HOSPITAL | Age: 47
End: 2022-02-08
Attending: NURSE PRACTITIONER
Payer: OTHER GOVERNMENT

## 2022-02-08 VITALS
OXYGEN SATURATION: 97 % | DIASTOLIC BLOOD PRESSURE: 80 MMHG | HEART RATE: 83 BPM | RESPIRATION RATE: 18 BRPM | SYSTOLIC BLOOD PRESSURE: 117 MMHG

## 2022-02-08 DIAGNOSIS — K76.0 FATTY LIVER: ICD-10-CM

## 2022-02-08 DIAGNOSIS — R76.8 POSITIVE ANA (ANTINUCLEAR ANTIBODY): ICD-10-CM

## 2022-02-08 DIAGNOSIS — R74.8 ELEVATED LIVER ENZYMES: ICD-10-CM

## 2022-02-08 DIAGNOSIS — K75.4 AUTOIMMUNE HEPATITIS: Primary | ICD-10-CM

## 2022-02-08 DIAGNOSIS — Z79.52 LONG TERM SYSTEMIC STEROID USER: ICD-10-CM

## 2022-02-08 DIAGNOSIS — M15.9 PRIMARY OSTEOARTHRITIS INVOLVING MULTIPLE JOINTS: ICD-10-CM

## 2022-02-08 DIAGNOSIS — K76.89 HEPATIC CYST: ICD-10-CM

## 2022-02-08 DIAGNOSIS — Z71.89 COUNSELING AND COORDINATION OF CARE: ICD-10-CM

## 2022-02-08 LAB
ALBUMIN SERPL BCP-MCNC: 4.5 G/DL (ref 3.5–5.2)
ALP SERPL-CCNC: 50 U/L (ref 55–135)
ALT SERPL W/O P-5'-P-CCNC: 25 U/L (ref 10–44)
AST SERPL-CCNC: 24 U/L (ref 10–40)
BILIRUB DIRECT SERPL-MCNC: 0.2 MG/DL (ref 0.1–0.3)
BILIRUB SERPL-MCNC: 0.4 MG/DL (ref 0.1–1)
PROT SERPL-MCNC: 7.2 G/DL (ref 6–8.4)

## 2022-02-08 PROCEDURE — 99214 PR OFFICE/OUTPT VISIT, EST, LEVL IV, 30-39 MIN: ICD-10-PCS | Mod: S$PBB,,, | Performed by: INTERNAL MEDICINE

## 2022-02-08 PROCEDURE — 36415 COLL VENOUS BLD VENIPUNCTURE: CPT | Mod: PN | Performed by: NURSE PRACTITIONER

## 2022-02-08 PROCEDURE — 80076 HEPATIC FUNCTION PANEL: CPT | Performed by: NURSE PRACTITIONER

## 2022-02-08 PROCEDURE — 99214 OFFICE O/P EST MOD 30 MIN: CPT | Mod: S$PBB,,, | Performed by: INTERNAL MEDICINE

## 2022-02-08 PROCEDURE — 99213 OFFICE O/P EST LOW 20 MIN: CPT | Mod: PBBFAC,PN | Performed by: INTERNAL MEDICINE

## 2022-02-08 PROCEDURE — 99999 PR PBB SHADOW E&M-EST. PATIENT-LVL III: ICD-10-PCS | Mod: PBBFAC,,, | Performed by: INTERNAL MEDICINE

## 2022-02-08 PROCEDURE — 99999 PR PBB SHADOW E&M-EST. PATIENT-LVL III: CPT | Mod: PBBFAC,,, | Performed by: INTERNAL MEDICINE

## 2022-02-08 NOTE — PROGRESS NOTES
RHEUMATOLOGY OUTPATIENT CLINIC NOTE    2/8/2022    Attending Rheumatologist: Dashawn Portillo  Primary Care Provider: Seu Hansen MD   Physician Requesting Consultation: No referring provider defined for this encounter.  Chief Complaint/Reason For Consultation:  No chief complaint on file.      Subjective:       JORGE Case is a 46 y.o. White female with medical history noted below who presents for evaluation of abnormal labs.   ELIJAH checked in the setting of abnormal LFTs, LFT were caught on routine testing. Patient notes waking in the morning and feeling tired and achy all over. Shoulders, hips and knees, are worse areas. ?RLS. No joint swelling. 30-60 minutes of morning stiffness. NSAIDs helps. Activity worsens pain. Fatigue. Abdominal spasms. No constipation/diarrhea. No associated with food. No FHX of AI disease. Had rash on her chin for over a year which has resolved, easy bruising, Raynaud's. No Alopecia, Oral/Nasal Ulcers, Photosensitivity, Dry Eyes, Dry Mouth, Pleuritis/serositis, Easy Bruising, LAD, Miscarriages/Blood clots, GERD, Skin tightening, SOB, chest pain, fever, wt loss.     Today  Patient here for follow up.   Last visit PRN continued for AIH. She is doing well. LFT improving, Hepatology tapering med. Denies any complaints today. Tolerating meds.       Review of Systems   Constitutional: Negative for chills, fatigue, fever and unexpected weight change.   HENT: Negative for mouth sores and trouble swallowing.    Eyes: Negative for redness and eye dryness.   Respiratory: Negative for cough and shortness of breath.    Cardiovascular: Negative for chest pain.   Gastrointestinal: Negative for abdominal distention, constipation, diarrhea, nausea and vomiting.   Genitourinary: Negative for dysuria, genital sores and vaginal dryness.   Musculoskeletal: Negative for arthralgias, back pain, gait problem, joint swelling, leg pain, myalgias, neck pain, neck stiffness and joint deformity.    Integumentary:  Negative for rash.   Neurological: Negative for weakness, numbness and headaches.   Hematological: Negative for adenopathy. Does not bruise/bleed easily.   Psychiatric/Behavioral: Negative for confusion, decreased concentration and sleep disturbance. The patient is not nervous/anxious.    All other systems reviewed and are negative.       Chronic comorbid conditions affecting medical decision making today:  Past Medical History:   Diagnosis Date    Endometriosis     Fatty liver     Hypertension      Past Surgical History:   Procedure Laterality Date    BREAST BIOPSY Left     benign    HYSTERECTOMY      LASIK Bilateral     TOTAL ABDOMINAL HYSTERECTOMY W/ BILATERAL SALPINGOOPHORECTOMY      Pt still has her cervix in place     Family History   Problem Relation Age of Onset    COPD Mother     Asthma Mother     Hypertension Mother     Heart failure Mother     Arthritis Mother     Heart disease Father         s/p quadruple bypass    Cirrhosis Neg Hx      Social History     Substance and Sexual Activity   Alcohol Use Yes     Social History     Tobacco Use   Smoking Status Current Some Day Smoker    Types: Vaping with nicotine   Smokeless Tobacco Never Used     Social History     Substance and Sexual Activity   Drug Use Never       Current Outpatient Medications:     calcium-vitamin D (CALCIUM 600 + D,3,) 600 mg-10 mcg (400 unit) Tab, Take 1 tablet by mouth 2 (two) times daily., Disp: 60 tablet, Rfl: 3    EScitalopram oxalate (LEXAPRO) 20 MG tablet, Take 1 tablet (20 mg total) by mouth once daily., Disp: 90 tablet, Rfl: 1    hydroCHLOROthiazide (HYDRODIURIL) 25 MG tablet, TAKE ONE TABLET BY MOUTH ONCE DAILY FOR FLUID AND FOR BLOOD PRESSURE, Disp: 90 tablet, Rfl: 1    lisinopriL (PRINIVIL,ZESTRIL) 40 MG tablet, TAKE ONE TABLET BY MOUTH ONCE DAILY FOR BLOOD PRESSURE, Disp: 90 tablet, Rfl: 1    lisinopril (PRINIVIL,ZESTRIL) 5 MG tablet, TAKE 1 TABLET BY MOUTH EVERY DAY, Disp: 30 tablet,  Rfl: 0    pantoprazole (PROTONIX) 40 MG tablet, Take 1 tablet (40 mg total) by mouth once daily., Disp: 30 tablet, Rfl: 11    predniSONE (DELTASONE) 5 MG tablet, Take 1 tablet (5 mg total) by mouth once daily. Wean as directed, Disp: 30 tablet, Rfl: 1    sulfamethoxazole-trimethoprim 800-160mg (BACTRIM DS) 800-160 mg Tab, Take 1 tablet by mouth every other day., Disp: 15 tablet, Rfl: 3    Current Facility-Administered Medications:     acetaminophen tablet 650 mg, 650 mg, Oral, Once PRN, Tony Herrera MD    albuterol inhaler 2 puff, 2 puff, Inhalation, Q20 Min PRN, Tony Herrera MD    diphenhydrAMINE injection 25 mg, 25 mg, Intravenous, Once PRN, Tony Herrera MD    EPINEPHrine (EPIPEN) 0.3 mg/0.3 mL pen injection 0.3 mg, 0.3 mg, Intramuscular, PRN, Tony Herrera MD    methylPREDNISolone sodium succinate injection 40 mg, 40 mg, Intravenous, Once PRN, Tony Herrera MD    ondansetron disintegrating tablet 4 mg, 4 mg, Oral, Once PRN, Tony Herrera MD    sodium chloride 0.9% 500 mL flush bag, , Intravenous, PRN, Tony Herrera MD    sodium chloride 0.9% flush 10 mL, 10 mL, Intravenous, PRN, Tony Herrera MD     Objective:         Vitals:    02/08/22 1446   BP: 117/80   Pulse: 83   Resp: 18     Physical Exam   Musculoskeletal:      Comments: Knee crepitus  No tender points        Reviewed old and all outside pertinent medical records available.    All lab results personally reviewed and interpreted by me.  Lab Results   Component Value Date    WBC 6.89 01/07/2022    HGB 11.4 (L) 01/07/2022    HCT 35.8 (L) 01/07/2022    MCV 96 01/07/2022    MCH 30.6 01/07/2022    MCHC 31.8 (L) 01/07/2022    RDW 14.6 (H) 01/07/2022     01/07/2022    MPV 9.9 01/07/2022       Lab Results   Component Value Date     01/07/2022    K 3.7 01/07/2022     01/07/2022    CO2 28 01/07/2022    GLU 90 01/07/2022    BUN 14 01/07/2022    CALCIUM 9.5 01/07/2022    PROT 7.1 01/25/2022     ALBUMIN 4.4 01/25/2022    BILITOT 0.6 01/25/2022    AST 23 01/25/2022    ALKPHOS 46 (L) 01/25/2022    ALT 29 01/25/2022       No results found for: COLORU, APPEARANCEUA, SPECGRAV, PHUR, PHUA, PROTEINUA, GLUCOSEU, KETONESU, BLOODU, LEUKOCYTESUR, NITRITE, UROBILINOGEN    No results found for: CRP    No results found for: SEDRATE, ERYTHROCYTES    No results found for: ELIJAH, RF, SEDRATE    No components found for: 25OHVITDTOT, 91KHHIJF7, 87PFNHEW0, METHODNOTE    No results found for: URICACID    No components found for: TSPOTTB        Imaging:  All imaging reviewed and independently interpreted by me.         ASSESSMENT / PLAN:     Irena Case is a 46 y.o. White female with:      1. Autoimmune hepatitis, +ELIJAH, ASMA  - patients abnormal LFT, +ASMA, +ELIJAH, likely AIH  - currently on PDN 2.5mg  - Improvement of LFT noted  - Hepatology discussing Biopsy   - at this point will leave management to them   - reassurance     2. Steroid Use  -I discussed the potential adverse effects of long term PDN use (I.e. osteoporosis, increase risk of infection, skin fragility, weight gain, blood sugar elevation and avascular necrosis). I encourage patient to take Calcium (1200mg daily) and Vit D (800 units daily) while on PDN.     3. Primary osteoarthritis involving multiple joints  - patient joint pain likely OA  - stable   - NSAIDs PRN   - reassurance and exercise     4. Other specified counseling  - over 10 minutes spent regarding below topics:  - Immunization counseling done.  - Weight loss counseling done.  - Nutrition and exercise counseling.  - Limitation of alcohol consumption.  - Regular exercise:  Aerobic and resistance.  - Medication counseling provided.      Follow up in about 3 months (around 5/8/2022).    Method of contact with patient concerns: [facility]    Disclaimer:  This note is prepared using voice recognition software and as such is likely to have errors and has not been proof read. Please contact me  for questions.     Time spent: 30 minutes in face to face discussion concerning diagnosis, prognosis, review of lab and test results, benefits of treatment as well as management of disease, counseling of patient and coordination of care between various health care providers.  Greater than half the time spent was used for coordination of care and counseling of patient.    Dashawn Portillo M.D.  Rheumatology Department   [facility]

## 2022-02-09 DIAGNOSIS — R74.01 ELEVATED TRANSAMINASE LEVEL: ICD-10-CM

## 2022-02-09 DIAGNOSIS — K76.0 FATTY LIVER: ICD-10-CM

## 2022-02-09 DIAGNOSIS — R76.8 POSITIVE ANA (ANTINUCLEAR ANTIBODY): Primary | ICD-10-CM

## 2022-02-10 ENCOUNTER — TELEPHONE (OUTPATIENT)
Dept: HEPATOLOGY | Facility: CLINIC | Age: 47
End: 2022-02-10
Payer: OTHER GOVERNMENT

## 2022-02-15 ENCOUNTER — PATIENT MESSAGE (OUTPATIENT)
Dept: FAMILY MEDICINE | Facility: CLINIC | Age: 47
End: 2022-02-15
Payer: OTHER GOVERNMENT

## 2022-04-04 ENCOUNTER — PATIENT MESSAGE (OUTPATIENT)
Dept: HEPATOLOGY | Facility: CLINIC | Age: 47
End: 2022-04-04
Payer: OTHER GOVERNMENT

## 2022-04-06 ENCOUNTER — LAB VISIT (OUTPATIENT)
Dept: LAB | Facility: HOSPITAL | Age: 47
End: 2022-04-06
Attending: NURSE PRACTITIONER
Payer: OTHER GOVERNMENT

## 2022-04-06 DIAGNOSIS — K76.0 FATTY LIVER: ICD-10-CM

## 2022-04-06 DIAGNOSIS — R74.01 ELEVATED TRANSAMINASE LEVEL: ICD-10-CM

## 2022-04-06 DIAGNOSIS — R76.8 POSITIVE ANA (ANTINUCLEAR ANTIBODY): ICD-10-CM

## 2022-04-06 LAB
ALBUMIN SERPL BCP-MCNC: 4.8 G/DL (ref 3.5–5.2)
ALP SERPL-CCNC: 43 U/L (ref 55–135)
ALT SERPL W/O P-5'-P-CCNC: 29 U/L (ref 10–44)
AST SERPL-CCNC: 29 U/L (ref 10–40)
BILIRUB DIRECT SERPL-MCNC: 0.2 MG/DL (ref 0.1–0.3)
BILIRUB SERPL-MCNC: 0.5 MG/DL (ref 0.1–1)
PROT SERPL-MCNC: 7.4 G/DL (ref 6–8.4)

## 2022-04-06 PROCEDURE — 36415 COLL VENOUS BLD VENIPUNCTURE: CPT | Mod: PN | Performed by: NURSE PRACTITIONER

## 2022-04-06 PROCEDURE — 86256 FLUORESCENT ANTIBODY TITER: CPT | Performed by: NURSE PRACTITIONER

## 2022-04-06 PROCEDURE — 86038 ANTINUCLEAR ANTIBODIES: CPT | Performed by: NURSE PRACTITIONER

## 2022-04-06 PROCEDURE — 80076 HEPATIC FUNCTION PANEL: CPT | Performed by: NURSE PRACTITIONER

## 2022-04-06 PROCEDURE — 82784 ASSAY IGA/IGD/IGG/IGM EACH: CPT | Performed by: NURSE PRACTITIONER

## 2022-04-07 LAB — IGG SERPL-MCNC: 748 MG/DL (ref 650–1600)

## 2022-04-08 LAB — ANA SER QL IF: NORMAL

## 2022-04-11 LAB — SMOOTH MUSCLE AB TITR SER IF: NORMAL {TITER}

## 2022-04-12 ENCOUNTER — PROCEDURE VISIT (OUTPATIENT)
Dept: HEPATOLOGY | Facility: CLINIC | Age: 47
End: 2022-04-12
Payer: OTHER GOVERNMENT

## 2022-04-12 ENCOUNTER — OFFICE VISIT (OUTPATIENT)
Dept: HEPATOLOGY | Facility: CLINIC | Age: 47
End: 2022-04-12
Payer: OTHER GOVERNMENT

## 2022-04-12 VITALS
WEIGHT: 136 LBS | HEART RATE: 72 BPM | DIASTOLIC BLOOD PRESSURE: 92 MMHG | OXYGEN SATURATION: 100 % | TEMPERATURE: 98 F | SYSTOLIC BLOOD PRESSURE: 170 MMHG | BODY MASS INDEX: 25.7 KG/M2

## 2022-04-12 DIAGNOSIS — R74.8 ELEVATED LIVER ENZYMES: ICD-10-CM

## 2022-04-12 DIAGNOSIS — R76.8 POSITIVE ANA (ANTINUCLEAR ANTIBODY): ICD-10-CM

## 2022-04-12 DIAGNOSIS — K76.89 HEPATIC CYST: ICD-10-CM

## 2022-04-12 DIAGNOSIS — K76.0 FATTY LIVER: Primary | ICD-10-CM

## 2022-04-12 DIAGNOSIS — R74.8 ELEVATED LIVER ENZYMES: Primary | ICD-10-CM

## 2022-04-12 DIAGNOSIS — K76.0 FATTY LIVER: ICD-10-CM

## 2022-04-12 PROCEDURE — 99214 OFFICE O/P EST MOD 30 MIN: CPT | Mod: PBBFAC | Performed by: NURSE PRACTITIONER

## 2022-04-12 PROCEDURE — 99999 PR PBB SHADOW E&M-EST. PATIENT-LVL IV: CPT | Mod: PBBFAC,,, | Performed by: NURSE PRACTITIONER

## 2022-04-12 PROCEDURE — 91200 LIVER ELASTOGRAPHY: CPT | Mod: 26,S$PBB,, | Performed by: NURSE PRACTITIONER

## 2022-04-12 PROCEDURE — 91200 LIVER ELASTOGRAPHY: CPT | Mod: PBBFAC | Performed by: NURSE PRACTITIONER

## 2022-04-12 PROCEDURE — 99999 PR PBB SHADOW E&M-EST. PATIENT-LVL IV: ICD-10-PCS | Mod: PBBFAC,,, | Performed by: NURSE PRACTITIONER

## 2022-04-12 PROCEDURE — 91200 FIBROSCAN (VIBRATION CONTROLLED TRANSIENT ELASTOGRAPHY): ICD-10-PCS | Mod: 26,S$PBB,, | Performed by: NURSE PRACTITIONER

## 2022-04-12 PROCEDURE — 99214 PR OFFICE/OUTPT VISIT, EST, LEVL IV, 30-39 MIN: ICD-10-PCS | Mod: S$PBB,,, | Performed by: NURSE PRACTITIONER

## 2022-04-12 PROCEDURE — 99214 OFFICE O/P EST MOD 30 MIN: CPT | Mod: S$PBB,,, | Performed by: NURSE PRACTITIONER

## 2022-04-12 NOTE — PROCEDURES
FibroScan (Vibration Controlled Transient Elastography)    Date/Time: 4/12/2022 10:45 AM  Performed by: Jamia Cowart NP  Authorized by: Jamia Cowart NP     Diagnosis:  NAFLD    Probe:  M    Universal Protocol: Patient's identity, procedure and site were verified, confirmatory pause was performed.  Discussed procedure including risks and potential complications.  Questions answered.  Patient verbalizes understanding and wishes to proceed with VCTE.     Procedure: After providing explanations of the procedure, patient was placed in the supine position with right arm in maximum abduction to allow optimal exposure of right lateral abdomen.  Patient was briefly assessed, Testing was performed in the mid-axillary location, 50Hz Shear Wave pulses were applied and the resulting Shear Wave and Propagation Speed detected with a 3.5 MHz ultrasonic signal, using the FibroScan probe, Skin to liver capsule distance and liver parenchyma were accessed during the entire examination with the FibroScan probe, Patient was instructed to breathe normally and to abstain from sudden movements during the procedure, allowing for random measurements of liver stiffness. At least 10 Shear Waves were produced, Individual measurements of each Shear Wave were calculated.  Patient tolerated the procedure well with no complications.  Meets discharge criteria as was dismissed.  Rates pain 0 out of 10.  Patient will follow up with ordering provider to review results.      Findings  Median liver stiffness score:  5.3  CAP Reading: dB/m:  250    IQR/med %:  21  Interpretation  Fibrosis interpretation is based on medial liver stiffness - Kilopascal (kPa).    Fibrosis Stage:  F 0-1  Steatosis interpretation is based on controlled attenuation parameter - (dB/m).    Steatosis Grade:  S1

## 2022-04-12 NOTE — PATIENT INSTRUCTIONS
1. Fibroscan today to assess for fat and scar tissue in the liver. Reassuringly, your Fibroscan is suggestive of mild fatty liver, with minimal to no fibrosis (scarring).  2. Repeat liver function tests in 3 months. If normal, we will discharge you back to the care of your PCP.   3. Limit alcohol use to no more than 7 drinks per week.  4. Avoid herbal supplements/alternative remedies.  5. Recommend vaccination for Hepatitis A.   6. Return to clinic as needed.

## 2022-04-12 NOTE — PROGRESS NOTES
[facility] - Established Patient Clinic Note    REFERRING PROVIDER: No ref. provider found  PCP: Sue Hansen MD    Chief Complaint: Elevated liver enzymes    HISTORY     This is a 47 y.o. female referred for evaluation of elevated liver enzymes. She was last seen in clinic by Grace Strong NP in 12/2021.    She had labs drawn in 9/2021 that showed significantly elevated transaminases (200s). Workup at this time revealed a +ELIJAH (titer 1:80) and + ASMA (titer 1:40). IgG WNL. She saw Rheumatology for positive autoimmune markers, and joint pain; she was diagnosed with autoimmune hepatitis based on serologies. She was placed on Prednisone 20 mg daily, which was tapered and eventually discontinued in early February 2022. She has never undergone a liver biopsy. She has no known history of liver disease or abnormal LFT's. Abdominal US and CT last year showed mild hepatomegaly, hepatic steatosis and hepatic cysts. Reassuringly, her LFT's have normalized. IgG remains normal. Repeat ELIJAH and ASMA are now negative. She has lost over 20 pounds since December 2021 through adherence to a low carbohydrate diet. Fibroscan today to stage her liver disease is suggestive of mild hepatic steatosis (S1), with F0-F1 fibrosis, and a low likelihood of cirrhosis.    Serological Work Up Results:    Lab Results   Component Value Date    SMOOTHMUSCAB Negative 1:40 04/06/2022    AMAIFA Negative 1:40 11/15/2021    IGGSERUM 748 04/06/2022    ANASCREEN Negative <1:80 04/06/2022    FERRITIN 307 (H) 09/14/2021    FESATURATED 21 11/15/2021    CERULOPLSM 31.0 09/14/2021    HEPBSAG Negative 09/14/2021    HEPBIGM Negative 09/14/2021    HEPCAB Negative 09/14/2021    HEPAIGM Negative 09/14/2021     US 9/14/2021- hepatomegaly, hepatic steatosis, 1.1 cm liver cyst, 1.7 cm focus (focal fatty sparing vs lesion), spleen UNL (12.3 cm)     CT 9/23/2021- hepatic steatosis, 3 liver cysts, no liver masses or biliary dilation     Risk factors for  fatty liver:   · Weight -- Body mass index is 25.7 kg/m².   · Reports weight loss over 20 pounds since December 2021.                   · Dyslipidemia -- no                               · Insulin resistance / diabetes -- no        · Hypertension -- yes  · Alcohol use -- used to drink wine daily, now limiting to 2-3 x week    Family history:  No family history of liver disease.  No known family h/o autoimmune disease.    Meds:  -Rx: none concerning for DILI  -OTC: n/a  -Herbs/supplements: n/a   No recent medication changes.     No recent illness or infection.  No muscle pain/weakness.     She is well appearing, and has no signs or symptoms of hepatic decompensation including jaundice, dark urine, pruritus, abdominal distention, lower extremity edema, melena or hematochezia, or periods of confusion suggestive of hepatic encephalopathy.    Past medical history, surgical history, problem list, family history, social history, allergies: Reviewed and updated in the appropriate section of the electronic medical record.    Current Outpatient Medications   Medication Sig Dispense Refill    calcium-vitamin D (CALCIUM 600 + D,3,) 600 mg-10 mcg (400 unit) Tab Take 1 tablet by mouth 2 (two) times daily. 60 tablet 3    EScitalopram oxalate (LEXAPRO) 20 MG tablet Take 1 tablet (20 mg total) by mouth once daily. 90 tablet 1    hydroCHLOROthiazide (HYDRODIURIL) 25 MG tablet TAKE ONE TABLET BY MOUTH ONCE DAILY FOR FLUID AND FOR BLOOD PRESSURE 90 tablet 1    lisinopriL (PRINIVIL,ZESTRIL) 40 MG tablet TAKE ONE TABLET BY MOUTH ONCE DAILY FOR BLOOD PRESSURE 90 tablet 1     No current facility-administered medications for this visit.     Medication list reviewed and updated.    Review of Systems   Constitutional: No fatigue, + intentional weight loss  Respiratory: Negative for shortness of breath.    Cardiovascular: Negative for leg swelling.  Gastrointestinal: Negative for abdominal distention, abdominal pain, diarrhea,  constipation, nausea, and vomiting. Negative for blood in stool, melena, or hematemesis.  Musculoskeletal: No joint pain  Skin: Negative for itching.  Neurological: Negative for dizziness or tremors. Negative for confusion, slowed mentation, or memory loss.   Hematological: Does not bruise/bleed easily.   Psychiatric: Negative for mood changes or sleep disturbance.    Physical Exam   Constitutional: Well-nourished. No distress. Alert and oriented to person, place, and time.  Eyes: No scleral icterus.   Pulmonary/Chest: Respiratory effort normal. No respiratory distress.   Abdominal: No distension, no ascites appreciated.   Extremities: No edema.   Neurological: No tremor. Gait normal. No asterixis.    Skin: No jaundice. No spider telangiectasias or palmar erythema.  Psychiatric: Normal mood and affect. Speech, behavior, and thought content normal. No depression or anxiety noted.     Vitals reviewed.  BP (!) 170/92 (BP Location: Right arm, Patient Position: Sitting, BP Method: Medium (Automatic))   Pulse 72   Temp 98 °F (36.7 °C) (Oral)   Wt 61.7 kg (136 lb)   SpO2 100% Comment: RA  BMI 25.70 kg/m²     LABS & DIAGNOSTIC STUDIES     I have personally reviewed pertinent laboratory findings:    Lab Results   Component Value Date    ALT 29 04/06/2022    AST 29 04/06/2022    ALKPHOS 43 (L) 04/06/2022    BILITOT 0.5 04/06/2022    ALBUMIN 4.8 04/06/2022    INR 1.0 01/07/2022       Lab Results   Component Value Date    WBC 6.89 01/07/2022    HGB 11.4 (L) 01/07/2022    HCT 35.8 (L) 01/07/2022    MCV 96 01/07/2022     01/07/2022       Lab Results   Component Value Date     01/07/2022    K 3.7 01/07/2022    BUN 14 01/07/2022    CREATININE 0.9 01/07/2022    ESTGFRAFRICA >60 01/07/2022    EGFRNONAA >60 01/07/2022       Lab Results   Component Value Date    SMOOTHMUSCAB Negative 1:40 04/06/2022    AMAIFA Negative 1:40 11/15/2021    IGGSERUM 748 04/06/2022    ANASCREEN Negative <1:80 04/06/2022    FERRITIN 307 (H)  09/14/2021    FESATURATED 21 11/15/2021    CERULOPLSM 31.0 09/14/2021    HEPBSAG Negative 09/14/2021    HEPBIGM Negative 09/14/2021    HEPCAB Negative 09/14/2021    HEPAIGM Negative 09/14/2021    REA16WDXW Negative 09/09/2021     I have personally reviewed the following result reports:  Abdominal US - 9/14/21  CT - 9/23/21    ASSESSMENT & PLAN     47 y.o. female with:    1. Elevated liver enzymes  Hepatic Function Panel    FibroScan (Vibration Controlled Transient Elastography)    Hepatitis B Surface Antibody, Qual/Quant   2. Positive ELIJAH (antinuclear antibody)  Hepatic Function Panel    FibroScan (Vibration Controlled Transient Elastography)    Hepatitis B Surface Antibody, Qual/Quant   3. Fatty liver  FibroScan (Vibration Controlled Transient Elastography)    Hepatitis B Surface Antibody, Qual/Quant   4. Hepatic cyst  FibroScan (Vibration Controlled Transient Elastography)    Hepatitis B Surface Antibody, Qual/Quant     - Fibroscan today to stage liver disease.  - Repeat liver function tests in 3 months.  - Recommend vaccination for Hepatitis A.   - Will also check immunity marker for Hepatitis B, and arrange vaccination if warranted.   - Limit alcohol use to no more than 7 drinks per week.  - Avoid herbal supplements/alternative remedies.  - If LFT's are normal in 3 months, will discharge patient back to the care of her PCP.     Thank you for allowing me to participate in the care of Irena Case       Hepatology Nurse Practitioner  [facility]  4/12/2022 @ 1115    Duration of encounter: 30 min  This includes face to face time and non-face to face time preparing to see the patient (eg, review of tests), obtaining and/or reviewing separately obtained history, documenting clinical information in the electronic or other health record, independently interpreting results and communicating results to the patient/family/caregiver, or Care coordination.

## 2022-05-17 ENCOUNTER — IMMUNIZATION (OUTPATIENT)
Dept: PHARMACY | Facility: CLINIC | Age: 47
End: 2022-05-17
Payer: OTHER GOVERNMENT

## 2022-05-17 DIAGNOSIS — Z23 NEED FOR VACCINATION: Primary | ICD-10-CM

## 2022-07-18 DIAGNOSIS — F33.1 MODERATE EPISODE OF RECURRENT MAJOR DEPRESSIVE DISORDER: ICD-10-CM

## 2022-07-18 DIAGNOSIS — I10 ESSENTIAL HYPERTENSION: ICD-10-CM

## 2022-07-18 NOTE — TELEPHONE ENCOUNTER
Care Due:                  Date            Visit Type   Department     Provider  --------------------------------------------------------------------------------                                 -         [facility]                              PRIMARY      MEDICINE/INTERN  Last Visit: 09-      CARE ([facility])   AL MED         Sue Hansen  Next Visit: None Scheduled  None         None Found                                                            Last  Test          Frequency    Reason                     Performed    Due Date  --------------------------------------------------------------------------------    Office Visit  12 months..  EScitalopram,              09- 09-                             hydroCHLOROthiazide,                             lisinopriL...............    [employer] Embedded Care Gaps. Reference number: [id number]. 7/18/2022   8:05:04 AM CDT

## 2022-07-19 RX ORDER — LISINOPRIL 40 MG/1
TABLET ORAL
Qty: 90 TABLET | Refills: 0 | OUTPATIENT
Start: 2022-07-19

## 2022-07-19 RX ORDER — HYDROCHLOROTHIAZIDE 25 MG/1
TABLET ORAL
Qty: 90 TABLET | Refills: 0 | OUTPATIENT
Start: 2022-07-19

## 2022-07-19 RX ORDER — ESCITALOPRAM OXALATE 20 MG/1
TABLET ORAL
Qty: 90 TABLET | Refills: 0 | Status: SHIPPED | OUTPATIENT
Start: 2022-07-19 | End: 2022-12-19 | Stop reason: SDUPTHER

## 2022-07-19 NOTE — TELEPHONE ENCOUNTER
Refill Routing Note   Medication(s) are not appropriate for processing by [facility] for the following reason(s):      - Required vitals are abnormal    [facility] action(s):  Approve  Defer Medication-related problems identified: Requires appointment        Medication reconciliation completed: No     Appointments  past 12m or future 3m with PCP    Date Provider   Last Visit   9/8/2021 Sue Hansen MD   Next Visit   Visit date not found Sue Hansen MD   ED visits in past 90 days: 0        Note composed:9:55 AM 07/19/2022

## 2022-07-20 NOTE — TELEPHONE ENCOUNTER
Provider Staff:     Action required for this patient.    Please note Refusal of medication.            Requested Prescriptions     Refused Prescriptions Disp Refills    hydroCHLOROthiazide (HYDRODIURIL) 25 MG tablet [Pharmacy Med Name: hydrochlorothiazide 25 mg tablet] 90 tablet 0     Sig: TAKE ONE TABLET BY MOUTH ONCE DAILY FOR FLUID AND BLOOD PRESSURE     Refused By: CATRACHITA CEJA     Reason for Refusal: Other (See comments)    lisinopriL (PRINIVIL,ZESTRIL) 40 MG tablet [Pharmacy Med Name: lisinopril 40 mg tablet] 90 tablet 0     Sig: TAKE ONE TABLET BY MOUTH ONCE DAILY FOR BLOOD PRESSURE     Refused By: CATRACHITA CEJA     Reason for Refusal: Other (See comments)      Thanks!  [facility]   Note composed: 07/19/2022 11:04 PM

## 2022-08-30 ENCOUNTER — PATIENT MESSAGE (OUTPATIENT)
Dept: FAMILY MEDICINE | Facility: CLINIC | Age: 47
End: 2022-08-30
Payer: OTHER GOVERNMENT

## 2022-08-30 DIAGNOSIS — N89.8 VAGINAL DISCHARGE: Primary | ICD-10-CM

## 2022-09-01 ENCOUNTER — TELEPHONE (OUTPATIENT)
Dept: FAMILY MEDICINE | Facility: CLINIC | Age: 47
End: 2022-09-01
Payer: OTHER GOVERNMENT

## 2022-09-01 NOTE — LETTER
September 1, 2022    Irena Case  [address] [place] [address]             [address] [place] [id number] [place] [address], [facility]  [place] [address]  Phone: [phone]  Fax: [phone] Dear Ms. Case:    Mansi we were unable to contact you to schedule your Gynecology appointment. Please give the referral department a call at [phone].        If you have any questions or concerns, please don't hesitate to call.    Sincerely,        Debra Terrazas MA

## 2022-10-26 DIAGNOSIS — Z12.31 OTHER SCREENING MAMMOGRAM: ICD-10-CM

## 2022-11-08 DIAGNOSIS — I10 ESSENTIAL HYPERTENSION: ICD-10-CM

## 2022-11-08 NOTE — TELEPHONE ENCOUNTER
----- Message from Maria Parra sent at 11/8/2022 11:42 AM CST -----  .Type: Patient Call Back    Who called:self    What is the request in detail: pt would like an update on refill request. She is going to be to going out of town Wednesday and just wants to make sure she has her medication before leaving. Please call    Can the clinic reply by [facility]?    Would the patient rather a call back or a response via [facility]? call    Best call back number:.[phone] (home)

## 2022-11-08 NOTE — TELEPHONE ENCOUNTER
No new care gaps identified.  [facility] Embedded Care Gaps. Reference number: [id number]. 11/08/2022   3:27:20 PM CST

## 2022-11-08 NOTE — TELEPHONE ENCOUNTER
Pt have an appointment schedule for 12/19 she would like to get some supply till her appointment.

## 2022-11-09 RX ORDER — LISINOPRIL 40 MG/1
40 TABLET ORAL DAILY
Qty: 90 TABLET | Refills: 1 | OUTPATIENT
Start: 2022-11-09

## 2022-11-09 RX ORDER — HYDROCHLOROTHIAZIDE 25 MG/1
TABLET ORAL
Qty: 90 TABLET | Refills: 1 | OUTPATIENT
Start: 2022-11-09

## 2022-11-15 ENCOUNTER — PATIENT MESSAGE (OUTPATIENT)
Dept: FAMILY MEDICINE | Facility: CLINIC | Age: 47
End: 2022-11-15
Payer: OTHER GOVERNMENT

## 2022-11-15 DIAGNOSIS — I10 ESSENTIAL HYPERTENSION: ICD-10-CM

## 2022-11-15 RX ORDER — LISINOPRIL 40 MG/1
40 TABLET ORAL DAILY
Qty: 40 TABLET | Refills: 0 | Status: SHIPPED | OUTPATIENT
Start: 2022-11-15 | End: 2022-12-19 | Stop reason: SDUPTHER

## 2022-11-15 RX ORDER — HYDROCHLOROTHIAZIDE 25 MG/1
TABLET ORAL
Qty: 40 TABLET | Refills: 0 | Status: SHIPPED | OUTPATIENT
Start: 2022-11-15 | End: 2022-12-19 | Stop reason: SDUPTHER

## 2022-11-15 NOTE — TELEPHONE ENCOUNTER
No new care gaps identified.  [facility] Embedded Care Gaps. Reference number: [id number]. 11/15/2022   12:48:26 PM CST

## 2022-11-15 NOTE — TELEPHONE ENCOUNTER
No new care gaps identified.  [facility] Embedded Care Gaps. Reference number: [id number]. 11/15/2022   12:59:15 PM CST

## 2022-11-16 NOTE — TELEPHONE ENCOUNTER
Last Office Visit Info:   The patient's last visit with Sue Hansen MD was on 9/8/2021.    The patient's last visit in current department was on Visit date not found.        Last CBC Results:   Lab Results   Component Value Date    WBC 6.89 01/07/2022    HGB 11.4 (L) 01/07/2022    HCT 35.8 (L) 01/07/2022     01/07/2022       Last CMP Results  Lab Results   Component Value Date     01/07/2022    K 3.7 01/07/2022     01/07/2022    CO2 28 01/07/2022    BUN 14 01/07/2022    CREATININE 0.9 01/07/2022    CALCIUM 9.5 01/07/2022    ALBUMIN 4.8 04/06/2022    AST 29 04/06/2022    ALT 29 04/06/2022       Last Lipids  Lab Results   Component Value Date    CHOL 193 09/09/2021    TRIG 53 09/09/2021    HDL 68 09/09/2021    LDLCALC 114.4 09/09/2021       Last A1C  Lab Results   Component Value Date    HGBA1C 5.1 09/09/2021       Last TSH  Lab Results   Component Value Date    TSH 1.381 09/09/2021             Current Med Refills  Medication List with Changes/Refills   Current Medications    CALCIUM-VITAMIN D (CALCIUM 600 + D,3,) 600 MG-10 MCG (400 UNIT) TAB    Take 1 tablet by mouth 2 (two) times daily.       Start Date: 12/14/2021End Date: --    ESCITALOPRAM OXALATE (LEXAPRO) 20 MG TABLET    TAKE ONE TABLET BY MOUTH ONCE DAILY FOR MOOD       Start Date: 7/19/2022 End Date: --   Changed and/or Refilled Medications    Modified Medication Previous Medication    HYDROCHLOROTHIAZIDE (HYDRODIURIL) 25 MG TABLET hydroCHLOROthiazide (HYDRODIURIL) 25 MG tablet       TAKE ONE TABLET BY MOUTH ONCE DAILY FOR FLUID AND FOR BLOOD PRESSURE Strength: 25 mg    TAKE ONE TABLET BY MOUTH ONCE DAILY FOR FLUID AND FOR BLOOD PRESSURE       Start Date: 11/15/2022End Date: --    Start Date: 12/28/2021End Date: 11/15/2022    LISINOPRIL (PRINIVIL,ZESTRIL) 40 MG TABLET lisinopriL (PRINIVIL,ZESTRIL) 40 MG tablet       Take 1 tablet (40 mg total) by mouth once daily.    TAKE ONE TABLET BY MOUTH ONCE DAILY FOR BLOOD PRESSURE       Start  Date: 11/15/2022End Date: --    Start Date: 12/28/2021End Date: 11/15/2022

## 2022-11-18 RX ORDER — HYDROCHLOROTHIAZIDE 25 MG/1
TABLET ORAL
Qty: 90 TABLET | Refills: 1 | OUTPATIENT
Start: 2022-11-18

## 2022-11-18 RX ORDER — LISINOPRIL 40 MG/1
40 TABLET ORAL DAILY
Qty: 90 TABLET | Refills: 1 | OUTPATIENT
Start: 2022-11-18

## 2022-12-19 ENCOUNTER — OFFICE VISIT (OUTPATIENT)
Dept: FAMILY MEDICINE | Facility: CLINIC | Age: 47
End: 2022-12-19
Payer: OTHER GOVERNMENT

## 2022-12-19 VITALS
HEIGHT: 61 IN | SYSTOLIC BLOOD PRESSURE: 120 MMHG | DIASTOLIC BLOOD PRESSURE: 80 MMHG | WEIGHT: 139.13 LBS | BODY MASS INDEX: 26.27 KG/M2 | OXYGEN SATURATION: 99 % | HEART RATE: 86 BPM | TEMPERATURE: 98 F

## 2022-12-19 DIAGNOSIS — F33.1 MODERATE EPISODE OF RECURRENT MAJOR DEPRESSIVE DISORDER: ICD-10-CM

## 2022-12-19 DIAGNOSIS — H61.23 BILATERAL IMPACTED CERUMEN: ICD-10-CM

## 2022-12-19 DIAGNOSIS — R41.840 CONCENTRATION DEFICIT: ICD-10-CM

## 2022-12-19 DIAGNOSIS — I10 PRIMARY HYPERTENSION: Primary | ICD-10-CM

## 2022-12-19 DIAGNOSIS — Z12.11 ENCOUNTER FOR SCREENING COLONOSCOPY: ICD-10-CM

## 2022-12-19 PROCEDURE — 69210 REMOVE IMPACTED EAR WAX UNI: CPT | Mod: PBBFAC,PO | Performed by: INTERNAL MEDICINE

## 2022-12-19 PROCEDURE — 69210 REMOVE IMPACTED EAR WAX UNI: CPT | Mod: S$PBB,,, | Performed by: INTERNAL MEDICINE

## 2022-12-19 PROCEDURE — 99999 PR PBB SHADOW E&M-EST. PATIENT-LVL IV: CPT | Mod: PBBFAC,,, | Performed by: INTERNAL MEDICINE

## 2022-12-19 PROCEDURE — 99999 PR PBB SHADOW E&M-EST. PATIENT-LVL IV: ICD-10-PCS | Mod: PBBFAC,,, | Performed by: INTERNAL MEDICINE

## 2022-12-19 PROCEDURE — 99214 OFFICE O/P EST MOD 30 MIN: CPT | Mod: PBBFAC,PO | Performed by: INTERNAL MEDICINE

## 2022-12-19 PROCEDURE — 99214 PR OFFICE/OUTPT VISIT, EST, LEVL IV, 30-39 MIN: ICD-10-PCS | Mod: 25,S$PBB,, | Performed by: INTERNAL MEDICINE

## 2022-12-19 PROCEDURE — 69210 PR REMOVAL IMPACTED CERUMEN REQUIRING INSTRUMENTATION, UNILATERAL: ICD-10-PCS | Mod: S$PBB,,, | Performed by: INTERNAL MEDICINE

## 2022-12-19 PROCEDURE — 99214 OFFICE O/P EST MOD 30 MIN: CPT | Mod: 25,S$PBB,, | Performed by: INTERNAL MEDICINE

## 2022-12-19 RX ORDER — HYDROCHLOROTHIAZIDE 25 MG/1
TABLET ORAL
Qty: 90 TABLET | Refills: 1 | Status: SHIPPED | OUTPATIENT
Start: 2022-12-19 | End: 2022-12-26

## 2022-12-19 RX ORDER — ESCITALOPRAM OXALATE 20 MG/1
TABLET ORAL
Qty: 90 TABLET | Refills: 1 | Status: SHIPPED | OUTPATIENT
Start: 2022-12-19 | End: 2023-10-31 | Stop reason: SDUPTHER

## 2022-12-19 RX ORDER — LISINOPRIL 40 MG/1
40 TABLET ORAL DAILY
Qty: 90 TABLET | Refills: 1 | Status: SHIPPED | OUTPATIENT
Start: 2022-12-19 | End: 2022-12-26

## 2022-12-19 NOTE — PROGRESS NOTES
SUBJECTIVE     No chief complaint on file.      JORGE Case is a 47 y.o. female with multiple medical diagnoses as listed in the medical history and problem list that presents for follow-up for HTN and depression. Pt has been doing well since her last visit.  She is fully compliant with meds and denies any adverse side effects.  Patient does not comply with a low-sodium diet and does not exercise.  She has not been able to check her blood pressures routinely, but typically sees readings at 120s/80s. Pt is also fully compliant with her Lexapro. She has been having some feelings of being overwhelmed. It occurs at work and home. This does not affect her everyday life, but it causes her to lose concentration, have a blank mind, and she is more forgetful. Pt also has a very low sex drive. Her depression is otherwise very well controlled.     PAST MEDICAL HISTORY:  Past Medical History:   Diagnosis Date    Endometriosis     Fatty liver     Hypertension        PAST SURGICAL HISTORY:  Past Surgical History:   Procedure Laterality Date    BREAST BIOPSY Left     benign    HYSTERECTOMY      LASIK Bilateral     TOTAL ABDOMINAL HYSTERECTOMY W/ BILATERAL SALPINGOOPHORECTOMY      Pt still has her cervix in place       SOCIAL HISTORY:  Social History     Socioeconomic History    Marital status:    Tobacco Use    Smoking status: Some Days     Types: Vaping with nicotine    Smokeless tobacco: Never   Substance and Sexual Activity    Alcohol use: Yes    Drug use: Never   Social History Narrative    ** Merged History Encounter **            FAMILY HISTORY:  Family History   Problem Relation Age of Onset    COPD Mother     Asthma Mother     Hypertension Mother     Heart failure Mother     Arthritis Mother     Heart disease Father         s/p quadruple bypass    Cirrhosis Neg Hx        ALLERGIES AND MEDICATIONS: updated and reviewed.  Review of patient's allergies indicates:  No Known Allergies  Current Outpatient  "Medications   Medication Sig Dispense Refill    calcium-vitamin D (CALCIUM 600 + D,3,) 600 mg-10 mcg (400 unit) Tab Take 1 tablet by mouth 2 (two) times daily. 60 tablet 3    EScitalopram oxalate (LEXAPRO) 20 MG tablet TAKE ONE TABLET BY MOUTH ONCE DAILY FOR MOOD Strength: 20 mg 90 tablet 1    hydroCHLOROthiazide (HYDRODIURIL) 25 MG tablet TAKE ONE TABLET BY MOUTH ONCE DAILY FOR FLUID AND FOR BLOOD PRESSURE Strength: 25 mg 90 tablet 1    lisinopriL (PRINIVIL,ZESTRIL) 40 MG tablet Take 1 tablet (40 mg total) by mouth once daily. 90 tablet 1     No current facility-administered medications for this visit.       ROS  Review of Systems   Constitutional:  Negative for activity change and unexpected weight change.   HENT:  Negative for hearing loss, rhinorrhea and trouble swallowing.    Eyes:  Negative for discharge and visual disturbance.   Respiratory:  Negative for chest tightness and wheezing.    Cardiovascular:  Negative for chest pain and palpitations.   Gastrointestinal:  Negative for blood in stool, constipation, diarrhea and vomiting.   Endocrine: Negative for polydipsia and polyuria.   Genitourinary:  Negative for difficulty urinating, dysuria, hematuria and menstrual problem.   Musculoskeletal:  Negative for arthralgias, joint swelling and neck pain.   Skin:  Negative for rash and wound.   Neurological:  Negative for weakness and headaches.   Psychiatric/Behavioral:  Negative for confusion and dysphoric mood.        OBJECTIVE     Physical Exam  Vitals:    12/19/22 1040   BP: 120/80   Pulse:    Temp:     Body mass index is 26.28 kg/m².  Weight: 63.1 kg (139 lb 1.8 oz)   Height: 5' 1" (154.9 cm)     Physical Exam  Constitutional:       General: She is not in acute distress.     Appearance: She is well-developed.   HENT:      Head: Normocephalic and atraumatic.      Right Ear: External ear normal. There is impacted cerumen.      Left Ear: External ear normal. There is impacted cerumen.      Nose: Nose normal. "   Eyes:      General: No scleral icterus.        Right eye: No discharge.         Left eye: No discharge.      Conjunctiva/sclera: Conjunctivae normal.   Neck:      Vascular: No JVD.      Trachea: No tracheal deviation.   Cardiovascular:      Rate and Rhythm: Normal rate and regular rhythm.      Heart sounds: No murmur heard.    No friction rub. No gallop.   Pulmonary:      Effort: Pulmonary effort is normal. No respiratory distress.      Breath sounds: Normal breath sounds. No wheezing.   Abdominal:      General: Bowel sounds are normal. There is no distension.      Palpations: Abdomen is soft. There is no mass.      Tenderness: There is no abdominal tenderness. There is no guarding or rebound.   Musculoskeletal:         General: No tenderness or deformity. Normal range of motion.      Cervical back: Normal range of motion and neck supple.   Skin:     General: Skin is warm and dry.      Findings: No erythema or rash.   Neurological:      Mental Status: She is alert and oriented to person, place, and time.      Motor: No abnormal muscle tone.      Coordination: Coordination normal.   Psychiatric:         Behavior: Behavior normal.         Thought Content: Thought content normal.         Judgment: Judgment normal.         Health Maintenance         Date Due Completion Date    Pneumococcal Vaccines (Age 0-64) (1 - PCV) Never done ---    TETANUS VACCINE Never done ---    Colorectal Cancer Screening Never done ---    COVID-19 Vaccine (4 - Booster for Moderna series) 07/12/2022 5/17/2022    Influenza Vaccine (1) 09/01/2022 1/21/2010    Mammogram 10/06/2022 10/6/2021    Lipid Panel 09/09/2026 9/9/2021              ASSESSMENT     47 y.o. female with     1. Primary hypertension    2. Moderate episode of recurrent major depressive disorder    3. Concentration deficit    4. Bilateral impacted cerumen    5. Encounter for screening colonoscopy        PLAN:     1. Primary hypertension  - BP well controlled; at goal of <140/90  -  The current medical regimen is effective;  continue present plan and medications.  - lisinopriL (PRINIVIL,ZESTRIL) 40 MG tablet; Take 1 tablet (40 mg total) by mouth once daily.  Dispense: 90 tablet; Refill: 1  - hydroCHLOROthiazide (HYDRODIURIL) 25 MG tablet; TAKE ONE TABLET BY MOUTH ONCE DAILY FOR FLUID AND FOR BLOOD PRESSURE Strength: 25 mg  Dispense: 90 tablet; Refill: 1    2. Moderate episode of recurrent major depressive disorder  - Stable; no acute issues  - The current medical regimen is effective;  continue present plan and medications.  - EScitalopram oxalate (LEXAPRO) 20 MG tablet; TAKE ONE TABLET BY MOUTH ONCE DAILY FOR MOOD Strength: 20 mg  Dispense: 90 tablet; Refill: 1  - Ambulatory referral/consult to Psychiatry; Future    3. Concentration deficit  - Ambulatory referral/consult to Psychiatry; Future    4. Bilateral impacted cerumen  - s/p removal with curette on exam today    5. Encounter for screening colonoscopy  - Cologuard Screening (Multitarget Stool DNA); Future  - Cologuard Screening (Multitarget Stool DNA)        RTC in 6 months     Sue Hansen MD  12/19/2022 10:41 AM        No follow-ups on file.

## 2023-01-18 DIAGNOSIS — I10 HYPERTENSION: ICD-10-CM

## 2023-01-23 ENCOUNTER — PATIENT MESSAGE (OUTPATIENT)
Dept: ADMINISTRATIVE | Facility: HOSPITAL | Age: 48
End: 2023-01-23
Payer: OTHER GOVERNMENT

## 2023-01-30 ENCOUNTER — PATIENT OUTREACH (OUTPATIENT)
Dept: ADMINISTRATIVE | Facility: HOSPITAL | Age: 48
End: 2023-01-30
Payer: OTHER GOVERNMENT

## 2023-01-30 NOTE — PROGRESS NOTES
Health Maintenance Due   Topic Date Due    Pneumococcal Vaccines (Age 0-64) (1 - PCV) Never done    TETANUS VACCINE  Never done    Colorectal Cancer Screening  Never done    COVID-19 Vaccine (5 - Booster for Moderna series) 07/12/2022    Influenza Vaccine (1) 09/01/2022    Mammogram  10/06/2022       Chart review done.  updated. Immunizations reviewed & updated. Care Everywhere updated.

## 2023-02-02 ENCOUNTER — PATIENT MESSAGE (OUTPATIENT)
Dept: FAMILY MEDICINE | Facility: CLINIC | Age: 48
End: 2023-02-02
Payer: OTHER GOVERNMENT

## 2023-02-02 DIAGNOSIS — Z12.11 COLON CANCER SCREENING: Primary | ICD-10-CM

## 2023-02-02 DIAGNOSIS — Z12.11 ENCOUNTER FOR SCREENING COLONOSCOPY: Primary | ICD-10-CM

## 2023-02-27 ENCOUNTER — PATIENT MESSAGE (OUTPATIENT)
Dept: FAMILY MEDICINE | Facility: CLINIC | Age: 48
End: 2023-02-27
Payer: OTHER GOVERNMENT

## 2023-02-28 DIAGNOSIS — U07.1 COVID-19: Primary | ICD-10-CM

## 2023-03-16 ENCOUNTER — PATIENT MESSAGE (OUTPATIENT)
Dept: FAMILY MEDICINE | Facility: CLINIC | Age: 48
End: 2023-03-16
Payer: OTHER GOVERNMENT

## 2023-04-12 ENCOUNTER — PATIENT MESSAGE (OUTPATIENT)
Dept: FAMILY MEDICINE | Facility: CLINIC | Age: 48
End: 2023-04-12

## 2023-04-12 ENCOUNTER — HOSPITAL ENCOUNTER (OUTPATIENT)
Dept: RADIOLOGY | Facility: HOSPITAL | Age: 48
Discharge: HOME | End: 2023-04-12
Attending: FAMILY MEDICINE
Payer: OTHER GOVERNMENT

## 2023-04-12 ENCOUNTER — OFFICE VISIT (OUTPATIENT)
Dept: FAMILY MEDICINE | Facility: CLINIC | Age: 48
End: 2023-04-12
Payer: OTHER GOVERNMENT

## 2023-04-12 ENCOUNTER — TELEPHONE (OUTPATIENT)
Dept: FAMILY MEDICINE | Facility: CLINIC | Age: 48
End: 2023-04-12
Payer: OTHER GOVERNMENT

## 2023-04-12 ENCOUNTER — TELEPHONE (OUTPATIENT)
Dept: FAMILY MEDICINE | Facility: CLINIC | Age: 48
End: 2023-04-12

## 2023-04-12 ENCOUNTER — HOSPITAL ENCOUNTER (OUTPATIENT)
Facility: HOSPITAL | Age: 48
Discharge: HOME | End: 2023-04-14
Attending: SURGERY | Admitting: SURGERY
Payer: OTHER GOVERNMENT

## 2023-04-12 VITALS
BODY MASS INDEX: 25.76 KG/M2 | SYSTOLIC BLOOD PRESSURE: 120 MMHG | HEART RATE: 87 BPM | TEMPERATURE: 100 F | DIASTOLIC BLOOD PRESSURE: 82 MMHG | OXYGEN SATURATION: 99 % | WEIGHT: 136.44 LBS | HEIGHT: 61 IN

## 2023-04-12 DIAGNOSIS — R10.31 RLQ ABDOMINAL PAIN: Primary | ICD-10-CM

## 2023-04-12 DIAGNOSIS — K35.30 ACUTE APPENDICITIS WITH LOCALIZED PERITONITIS, WITHOUT PERFORATION, ABSCESS, OR GANGRENE: Primary | ICD-10-CM

## 2023-04-12 DIAGNOSIS — R10.2 SUPRAPUBIC PAIN, ACUTE: ICD-10-CM

## 2023-04-12 DIAGNOSIS — R10.31 RLQ ABDOMINAL PAIN: ICD-10-CM

## 2023-04-12 DIAGNOSIS — K35.80 ACUTE APPENDICITIS: ICD-10-CM

## 2023-04-12 LAB
ALBUMIN SERPL BCP-MCNC: 4.3 G/DL (ref 3.5–5.2)
ALP SERPL-CCNC: 65 U/L (ref 55–135)
ALT SERPL W/O P-5'-P-CCNC: 20 U/L (ref 10–44)
ANION GAP SERPL CALC-SCNC: 10 MMOL/L (ref 8–16)
AST SERPL-CCNC: 21 U/L (ref 10–40)
BASOPHILS # BLD AUTO: 0.05 K/UL (ref 0–0.2)
BASOPHILS NFR BLD: 0.5 % (ref 0–1.9)
BILIRUB SERPL-MCNC: 0.7 MG/DL (ref 0.1–1)
BUN SERPL-MCNC: 10 MG/DL (ref 6–20)
CALCIUM SERPL-MCNC: 10 MG/DL (ref 8.7–10.5)
CHLORIDE SERPL-SCNC: 99 MMOL/L (ref 95–110)
CO2 SERPL-SCNC: 28 MMOL/L (ref 23–29)
CREAT SERPL-MCNC: 0.8 MG/DL (ref 0.5–1.4)
DIFFERENTIAL METHOD: ABNORMAL
EOSINOPHIL # BLD AUTO: 0.1 K/UL (ref 0–0.5)
EOSINOPHIL NFR BLD: 0.5 % (ref 0–8)
ERYTHROCYTE [DISTWIDTH] IN BLOOD BY AUTOMATED COUNT: 12.7 % (ref 11.5–14.5)
EST. GFR  (NO RACE VARIABLE): >60 ML/MIN/1.73 M^2
GLUCOSE SERPL-MCNC: 103 MG/DL (ref 70–110)
HCT VFR BLD AUTO: 37.9 % (ref 37–48.5)
HGB BLD-MCNC: 12.6 G/DL (ref 12–16)
IMM GRANULOCYTES # BLD AUTO: 0.02 K/UL (ref 0–0.04)
IMM GRANULOCYTES NFR BLD AUTO: 0.2 % (ref 0–0.5)
LIPASE SERPL-CCNC: 39 U/L (ref 4–60)
LYMPHOCYTES # BLD AUTO: 1.1 K/UL (ref 1–4.8)
LYMPHOCYTES NFR BLD: 11.4 % (ref 18–48)
MCH RBC QN AUTO: 30.4 PG (ref 27–31)
MCHC RBC AUTO-ENTMCNC: 33.2 G/DL (ref 32–36)
MCV RBC AUTO: 92 FL (ref 82–98)
MONOCYTES # BLD AUTO: 0.7 K/UL (ref 0.3–1)
MONOCYTES NFR BLD: 7.3 % (ref 4–15)
NEUTROPHILS # BLD AUTO: 7.7 K/UL (ref 1.8–7.7)
NEUTROPHILS NFR BLD: 80.1 % (ref 38–73)
NRBC BLD-RTO: 0 /100 WBC
PLATELET # BLD AUTO: 272 K/UL (ref 150–450)
PMV BLD AUTO: 9.4 FL (ref 9.2–12.9)
POTASSIUM SERPL-SCNC: 4.4 MMOL/L (ref 3.5–5.1)
PROT SERPL-MCNC: 7.6 G/DL (ref 6–8.4)
RBC # BLD AUTO: 4.14 M/UL (ref 4–5.4)
SODIUM SERPL-SCNC: 137 MMOL/L (ref 136–145)
WBC # BLD AUTO: 9.58 K/UL (ref 3.9–12.7)

## 2023-04-12 PROCEDURE — G0378 HOSPITAL OBSERVATION PER HR: HCPCS

## 2023-04-12 PROCEDURE — 99213 OFFICE O/P EST LOW 20 MIN: CPT | Mod: PBBFAC,PO,25 | Performed by: FAMILY MEDICINE

## 2023-04-12 PROCEDURE — 99223 PR INITIAL HOSPITAL CARE,LEVL III: ICD-10-PCS | Mod: ,,, | Performed by: SURGERY

## 2023-04-12 PROCEDURE — 25500020 PHARM REV CODE 255: Performed by: FAMILY MEDICINE

## 2023-04-12 PROCEDURE — 74177 CT ABD & PELVIS W/CONTRAST: CPT | Mod: 26,,, | Performed by: RADIOLOGY

## 2023-04-12 PROCEDURE — 99999 PR PBB SHADOW E&M-EST. PATIENT-LVL III: ICD-10-PCS | Mod: PBBFAC,,, | Performed by: FAMILY MEDICINE

## 2023-04-12 PROCEDURE — 96366 THER/PROPH/DIAG IV INF ADDON: CPT

## 2023-04-12 PROCEDURE — 96375 TX/PRO/DX INJ NEW DRUG ADDON: CPT

## 2023-04-12 PROCEDURE — 83690 ASSAY OF LIPASE: CPT | Performed by: EMERGENCY MEDICINE

## 2023-04-12 PROCEDURE — 99285 EMERGENCY DEPT VISIT HI MDM: CPT | Mod: 25,27

## 2023-04-12 PROCEDURE — 96372 THER/PROPH/DIAG INJ SC/IM: CPT | Performed by: STUDENT IN AN ORGANIZED HEALTH CARE EDUCATION/TRAINING PROGRAM

## 2023-04-12 PROCEDURE — 74177 CT ABDOMEN PELVIS WITH CONTRAST: ICD-10-PCS | Mod: 26,,, | Performed by: RADIOLOGY

## 2023-04-12 PROCEDURE — 85025 COMPLETE CBC W/AUTO DIFF WBC: CPT | Performed by: EMERGENCY MEDICINE

## 2023-04-12 PROCEDURE — 63600175 PHARM REV CODE 636 W HCPCS: Performed by: STUDENT IN AN ORGANIZED HEALTH CARE EDUCATION/TRAINING PROGRAM

## 2023-04-12 PROCEDURE — 74177 CT ABD & PELVIS W/CONTRAST: CPT | Mod: TC

## 2023-04-12 PROCEDURE — 99214 PR OFFICE/OUTPT VISIT, EST, LEVL IV, 30-39 MIN: ICD-10-PCS | Mod: S$PBB,,, | Performed by: FAMILY MEDICINE

## 2023-04-12 PROCEDURE — 96361 HYDRATE IV INFUSION ADD-ON: CPT

## 2023-04-12 PROCEDURE — 25000003 PHARM REV CODE 250

## 2023-04-12 PROCEDURE — 99223 1ST HOSP IP/OBS HIGH 75: CPT | Mod: ,,, | Performed by: SURGERY

## 2023-04-12 PROCEDURE — 25000003 PHARM REV CODE 250: Performed by: STUDENT IN AN ORGANIZED HEALTH CARE EDUCATION/TRAINING PROGRAM

## 2023-04-12 PROCEDURE — A9698 NON-RAD CONTRAST MATERIALNOC: HCPCS | Performed by: FAMILY MEDICINE

## 2023-04-12 PROCEDURE — 63600175 PHARM REV CODE 636 W HCPCS

## 2023-04-12 PROCEDURE — 99999 PR PBB SHADOW E&M-EST. PATIENT-LVL III: CPT | Mod: PBBFAC,,, | Performed by: FAMILY MEDICINE

## 2023-04-12 PROCEDURE — 96365 THER/PROPH/DIAG IV INF INIT: CPT

## 2023-04-12 PROCEDURE — 99214 OFFICE O/P EST MOD 30 MIN: CPT | Mod: S$PBB,,, | Performed by: FAMILY MEDICINE

## 2023-04-12 PROCEDURE — 80053 COMPREHEN METABOLIC PANEL: CPT | Mod: 91 | Performed by: EMERGENCY MEDICINE

## 2023-04-12 RX ORDER — IBUPROFEN 400 MG/1
800 TABLET ORAL EVERY 6 HOURS PRN
Status: DISCONTINUED | OUTPATIENT
Start: 2023-04-12 | End: 2023-04-13

## 2023-04-12 RX ORDER — OXYCODONE HYDROCHLORIDE 5 MG/1
5 TABLET ORAL EVERY 4 HOURS PRN
Status: DISCONTINUED | OUTPATIENT
Start: 2023-04-12 | End: 2023-04-14 | Stop reason: HOSPADM

## 2023-04-12 RX ORDER — LABETALOL HYDROCHLORIDE 5 MG/ML
10 INJECTION, SOLUTION INTRAVENOUS EVERY 4 HOURS PRN
Status: DISCONTINUED | OUTPATIENT
Start: 2023-04-12 | End: 2023-04-14 | Stop reason: HOSPADM

## 2023-04-12 RX ORDER — TALC
6 POWDER (GRAM) TOPICAL NIGHTLY PRN
Status: DISCONTINUED | OUTPATIENT
Start: 2023-04-12 | End: 2023-04-14 | Stop reason: HOSPADM

## 2023-04-12 RX ORDER — ENOXAPARIN SODIUM 100 MG/ML
40 INJECTION SUBCUTANEOUS EVERY 24 HOURS
Status: DISCONTINUED | OUTPATIENT
Start: 2023-04-12 | End: 2023-04-14 | Stop reason: HOSPADM

## 2023-04-12 RX ORDER — SODIUM CHLORIDE, SODIUM LACTATE, POTASSIUM CHLORIDE, CALCIUM CHLORIDE 600; 310; 30; 20 MG/100ML; MG/100ML; MG/100ML; MG/100ML
INJECTION, SOLUTION INTRAVENOUS CONTINUOUS
Status: DISCONTINUED | OUTPATIENT
Start: 2023-04-12 | End: 2023-04-14

## 2023-04-12 RX ORDER — ACETAMINOPHEN 500 MG
1000 TABLET ORAL EVERY 8 HOURS
Status: DISCONTINUED | OUTPATIENT
Start: 2023-04-12 | End: 2023-04-14 | Stop reason: HOSPADM

## 2023-04-12 RX ORDER — SODIUM CHLORIDE 0.9 % (FLUSH) 0.9 %
10 SYRINGE (ML) INJECTION
Status: DISCONTINUED | OUTPATIENT
Start: 2023-04-12 | End: 2023-04-14 | Stop reason: HOSPADM

## 2023-04-12 RX ORDER — ESCITALOPRAM OXALATE 10 MG/1
20 TABLET ORAL DAILY
Status: DISCONTINUED | OUTPATIENT
Start: 2023-04-13 | End: 2023-04-14 | Stop reason: HOSPADM

## 2023-04-12 RX ORDER — OXYCODONE HYDROCHLORIDE 5 MG/1
10 TABLET ORAL EVERY 4 HOURS PRN
Status: DISCONTINUED | OUTPATIENT
Start: 2023-04-12 | End: 2023-04-14 | Stop reason: HOSPADM

## 2023-04-12 RX ORDER — KETOROLAC TROMETHAMINE 30 MG/ML
15 INJECTION, SOLUTION INTRAMUSCULAR; INTRAVENOUS
Status: COMPLETED | OUTPATIENT
Start: 2023-04-12 | End: 2023-04-12

## 2023-04-12 RX ORDER — ONDANSETRON 8 MG/1
8 TABLET, ORALLY DISINTEGRATING ORAL EVERY 8 HOURS PRN
Status: DISCONTINUED | OUTPATIENT
Start: 2023-04-12 | End: 2023-04-14 | Stop reason: HOSPADM

## 2023-04-12 RX ORDER — HYDRALAZINE HYDROCHLORIDE 20 MG/ML
10 INJECTION INTRAMUSCULAR; INTRAVENOUS EVERY 6 HOURS PRN
Status: DISCONTINUED | OUTPATIENT
Start: 2023-04-12 | End: 2023-04-14 | Stop reason: HOSPADM

## 2023-04-12 RX ADMIN — IOHEXOL 75 ML: 350 INJECTION, SOLUTION INTRAVENOUS at 10:04

## 2023-04-12 RX ADMIN — ENOXAPARIN SODIUM 40 MG: 40 INJECTION SUBCUTANEOUS at 04:04

## 2023-04-12 RX ADMIN — SODIUM CHLORIDE, POTASSIUM CHLORIDE, SODIUM LACTATE AND CALCIUM CHLORIDE: 600; 310; 30; 20 INJECTION, SOLUTION INTRAVENOUS at 08:04

## 2023-04-12 RX ADMIN — PIPERACILLIN AND TAZOBACTAM 4.5 G: 4; .5 INJECTION, POWDER, LYOPHILIZED, FOR SOLUTION INTRAVENOUS; PARENTERAL at 04:04

## 2023-04-12 RX ADMIN — ACETAMINOPHEN 1000 MG: 500 TABLET, FILM COATED ORAL at 10:04

## 2023-04-12 RX ADMIN — KETOROLAC TROMETHAMINE 15 MG: 30 INJECTION, SOLUTION INTRAMUSCULAR; INTRAVENOUS at 03:04

## 2023-04-12 RX ADMIN — BARIUM SULFATE 450 ML: 20 SUSPENSION ORAL at 10:04

## 2023-04-12 RX ADMIN — OXYCODONE 10 MG: 5 TABLET ORAL at 07:04

## 2023-04-12 RX ADMIN — SODIUM CHLORIDE 1000 ML: 9 INJECTION, SOLUTION INTRAVENOUS at 03:04

## 2023-04-12 NOTE — PROGRESS NOTES
"Subjective     Patient ID: Irena Case is a 48 y.o. female.    Chief Complaint: Abdominal Pain (For 3 days, RLQ, pain is worse when she moves, last time she went to bathroom was Monday but says that is normal, thought it was gas at first but no longer feels that way)    48 year old with rlq pain x 3 days that is worsening. It is worsened by any type of movement. She states when she walks her leg hurts and moving her leg causes her abdominal pain. She states bending over is painful. She also has some pain radiating up to her rib cage and pelvic region. She has no fever or chills. She has no nausea, vomiting, or diarrhea. She had her last BM on Monday. She has decreased appetite as well. She is s./p hysterectomy with bilateral SPO    Abdominal Pain    Past Medical History:   Diagnosis Date    Endometriosis     Fatty liver     Hypertension       Past Surgical History:   Procedure Laterality Date    BREAST BIOPSY Left     benign    HYSTERECTOMY      LASIK Bilateral     TOTAL ABDOMINAL HYSTERECTOMY W/ BILATERAL SALPINGOOPHORECTOMY      Pt still has her cervix in place     Family History   Problem Relation Age of Onset    COPD Mother     Asthma Mother     Hypertension Mother     Heart failure Mother     Arthritis Mother     Heart disease Father         s/p quadruple bypass    Cirrhosis Neg Hx      Social History     Socioeconomic History    Marital status:    Tobacco Use    Smoking status: Some Days     Types: Vaping with nicotine    Smokeless tobacco: Never   Substance and Sexual Activity    Alcohol use: Yes    Drug use: Never   Social History Narrative    ** Merged History Encounter **            Review of Systems   Gastrointestinal:  Positive for abdominal pain.        Objective     Vitals:    04/12/23 0832   BP: 120/82   Pulse: 87   Temp: 99.5 °F (37.5 °C)   TempSrc: Oral   SpO2: 99%   Weight: 61.9 kg (136 lb 7.4 oz)   Height: 5' 1" (1.549 m)       Physical Exam  Constitutional:       General: She is not in " acute distress.     Appearance: Normal appearance. She is not ill-appearing, toxic-appearing or diaphoretic.   HENT:      Head: Normocephalic and atraumatic.   Abdominal:      General: Abdomen is flat. Bowel sounds are normal. There is no distension.      Palpations: There is no mass.      Tenderness: There is abdominal tenderness. There is no guarding or rebound.      Hernia: No hernia is present.   Neurological:      Mental Status: She is alert.   Psychiatric:         Mood and Affect: Mood normal.         Behavior: Behavior normal.          Assessment and Plan     Problem List Items Addressed This Visit    None  Visit Diagnoses       RLQ abdominal pain    -  Primary    Relevant Orders    Urinalysis, Reflex to Urine Culture Urine, Clean Catch (Completed)    CT Abdomen Pelvis With Contrast    Suprapubic pain, acute        Relevant Orders    Urinalysis, Reflex to Urine Culture Urine, Clean Catch (Completed)    CT Abdomen Pelvis With Contrast            Irena was seen today for abdominal pain.    Diagnoses and all orders for this visit:    RLQ abdominal pain  -     Urinalysis, Reflex to Urine Culture Urine, Clean Catch; Future  -     CT Abdomen Pelvis With Contrast; Future    Suprapubic pain, acute  -     Urinalysis, Reflex to Urine Culture Urine, Clean Catch; Future  -     CT Abdomen Pelvis With Contrast; Future       Will check urine for infection and CT to rule out appendicitis.

## 2023-04-12 NOTE — FIRST PROVIDER EVALUATION
"Medical screening examination initiated.  I have conducted a focused provider triage encounter, findings are as follows:    Brief history of present illness:  47 yo with appendicitis diagnosed by outpt CT done today. Reports RLQ pain for 2 days with anorexia. Had a small amount of food around 11am today. Sent by pcp for CT and was called by her pcp with results. No chest pain, sob, back pain, dysuria. Has hx of hysterectomy.     Vitals:    04/12/23 1530   BP: 113/70   BP Location: Right arm   Patient Position: Sitting   Pulse: 89   Resp: 18   Temp: 99.6 °F (37.6 °C)   TempSrc: Oral   SpO2: 100%   Weight: 60.3 kg (133 lb)   Height: 5' 1" (1.549 m)       Pertinent physical exam:  calm. Polite. No acute distress. No resp distress. Normal gait. Lucid and linear.     Brief workup plan:   I have introduced myself and initiated work up but have not assumed care. Pt understands that there is a wait for a bed in the ED at this time and once a bed is available, she will be moved to the back and cared for by a provider and nursing care team.      Preliminary workup initiated; this workup will be continued and followed by the physician or advanced practice provider that is assigned to the patient when roomed.  "

## 2023-04-12 NOTE — CONSULTS
[facility] - Emergency Dept  General Surgery  Consult Note    Inpatient consult to General surgery  Consult performed by: Alysa Becerra MD  Consult ordered by: Alayna Holdsworth, PA-C  Reason for consult: acute appendicitis  Assessment/Recommendations: 49yo F w/PMH HTN, depression, fatty liver who presents to the ED with acute appendicitis    - admit to general surgery  - regular diet, NPO after midnight  - IV zosyn  - IVF  - daily labs  - PRN pain/nausea meds  - encourage ambulation  - restart home lexapro  - PRN labetalol and hydralazine for HTN, hold home lisinopril and hydrochlorothiazide  - to OR tomorrow for lap appendectomy, consent obtained      Subjective:     Chief Complaint/Reason for Admission: acute appendicitis    History of Present Illness: Ms Case is a 49yo F w/PMH depression, HTN, fatty liver who presents to the ED with two days of RLQ abdominal pain. Patient states that on Monday she developed RLQ abdominal pain. The pain worsened yesterday and she had associated nausea and low grade fevers. She describes the pain as sharp without radiation. She went to see her PCP this morning who ordered labs and a CT scan. She denies dysuria, hematuria, hematochezia, changes to bowel habits. Last BM was today and she is passing gas.    No history of MI or stroke, not on anticoagulation.    Work up showed a dilated appendix with periappendiceal inflammation. WBC within normal limits.    PSH: open hysterectomy, lap tubal ligation  Social: drinks 1-5 glasses of wine per day but has never had withdrawals, current vaper, denies IVDU    Current Facility-Administered Medications on File Prior to Encounter   Medication    [COMPLETED] barium sulfate (READI-CAT) suspension 450 mL    [COMPLETED] iohexoL (OMNIPAQUE 350) injection 75 mL     Current Outpatient Medications on File Prior to Encounter   Medication Sig    EScitalopram oxalate (LEXAPRO) 20 MG tablet TAKE ONE TABLET BY MOUTH ONCE DAILY FOR MOOD Strength: 20 mg     hydroCHLOROthiazide (HYDRODIURIL) 25 MG tablet TAKE ONE TABLET BY MOUTH ONCE DAILY    lisinopriL (PRINIVIL,ZESTRIL) 40 MG tablet TAKE ONE TABLET BY MOUTH ONCE DAILY    [DISCONTINUED] calcium-vitamin D (CALCIUM 600 + D,3,) 600 mg-10 mcg (400 unit) Tab Take 1 tablet by mouth 2 (two) times daily.       Review of patient's allergies indicates:  No Known Allergies    Past Medical History:   Diagnosis Date    Endometriosis     Fatty liver     Hypertension      Past Surgical History:   Procedure Laterality Date    BREAST BIOPSY Left     benign    HYSTERECTOMY      LASIK Bilateral     TOTAL ABDOMINAL HYSTERECTOMY W/ BILATERAL SALPINGOOPHORECTOMY      Pt still has her cervix in place     Family History       Problem Relation (Age of Onset)    Arthritis Mother    Asthma Mother    COPD Mother    Heart disease Father    Heart failure Mother    Hypertension Mother          Tobacco Use    Smoking status: Some Days     Types: Vaping with nicotine    Smokeless tobacco: Never   Substance and Sexual Activity    Alcohol use: Yes     Alcohol/week: 2.0 standard drinks     Types: 2 Glasses of wine per week     Comment: daily    Drug use: Never    Sexual activity: Not on file     Review of Systems   Constitutional:  Positive for fever. Negative for chills.   Respiratory:  Negative for chest tightness and shortness of breath.    Cardiovascular:  Negative for chest pain.   Gastrointestinal:  Positive for abdominal pain, constipation and nausea. Negative for blood in stool, diarrhea and vomiting.   Genitourinary:  Negative for dysuria and hematuria.   Skin:  Negative for color change.   Neurological:  Negative for dizziness and headaches.   Psychiatric/Behavioral:  Negative for agitation and confusion.    Objective:     Vital Signs (Most Recent):  Temp: 99.6 °F (37.6 °C) (04/12/23 1530)  Pulse: 89 (04/12/23 1530)  Resp: 18 (04/12/23 1530)  BP: 113/70 (04/12/23 1530)  SpO2: 100 % (04/12/23 1530) Vital Signs (24h Range):  Temp:  [99.5 °F  (37.5 °C)-99.6 °F (37.6 °C)] 99.6 °F (37.6 °C)  Pulse:  [87-89] 89  Resp:  [18] 18  SpO2:  [99 %-100 %] 100 %  BP: (113-120)/(70-82) 113/70     Weight: 60.3 kg (133 lb)  Body mass index is 25.13 kg/m².    No intake or output data in the 24 hours ending 04/12/23 1635    Physical Exam  Constitutional:       Appearance: Normal appearance.   HENT:      Head: Normocephalic and atraumatic.   Cardiovascular:      Rate and Rhythm: Normal rate.   Pulmonary:      Effort: Pulmonary effort is normal.   Abdominal:      General: There is no distension.      Palpations: Abdomen is soft.      Comments: Exquisite tenderness in the RLQ, positive Rosvig's sign   Skin:     General: Skin is warm and dry.      Capillary Refill: Capillary refill takes less than 2 seconds.   Neurological:      General: No focal deficit present.      Mental Status: She is alert.       Significant Labs:  BMP:   Recent Labs   Lab 04/12/23  1550         K 4.4   CL 99   CO2 28   BUN 10   CREATININE 0.8   CALCIUM 10.0     CBC:   Recent Labs   Lab 04/12/23  1550   WBC 9.58   RBC 4.14   HGB 12.6   HCT 37.9      MCV 92   MCH 30.4   MCHC 33.2       Significant Diagnostics:  CT: I have reviewed all pertinent results/findings within the past 24 hours. Dilated appendix with periappendiceal inflammation    Assessment/Plan:   49yo F w/PMH HTN, depression, fatty liver who presents to the ED with acute appendicitis    - admit to general surgery  - regular diet, NPO after midnight  - IV zosyn  - IVF  - daily labs  - PRN pain/nausea meds  - encourage ambulation  - restart home lexapro  - PRN labetalol and hydralazine for HTN, hold home lisinopril and hydrochlorothiazide  - to OR tomorrow for lap appendectomy, consent obtained    Thank you for your consult. I will follow-up with patient. Please contact us if you have any additional questions.    Alysa Becerra MD  General Surgery  [facility] [place] - Emergency Dept

## 2023-04-12 NOTE — NURSING
Patient arrived to floor via wheelchair via transporter from ED.  Patient transferred to independently.  [id number].  Patient was oriented to room, information on whiteboard, and medication regimen.  Bed low, adequate lighting provided, side rails x2 up, call bell within reach.  Admission assessment completed.  IVF started.  VSS.  Patient denied having any acute distress at this time.  None observed.  Will continue to monitor and follow treatment plan.  Family at bedside.     [facility]  Nurses Note -- 4 Eyes      4/12/2023       Skin assessed on: Q Shift      [x] No Pressure Injuries Present    []Prevention Measures Documented    [] Yes LDA  for Pressure Injury Previously documented     [] Yes New Pressure Injury Discovered   [] LDA for New Pressure Injury Added      Attending RN:  Emperatriz Concepcion RN     Second RN:  NEERU Parker

## 2023-04-12 NOTE — TELEPHONE ENCOUNTER
----- Message from Jacklyn Hansen sent at 4/12/2023  9:28 AM CDT -----  Regarding: Lab orders  Hello,          This patient is scheduled for a ct scan. The patient will need a cmp, bmp, or creatinine lab prior to receiving contrast. Please put in the lab order as soon as possible to avoid any delay in patient care.         Thank you,                       Elier Hansen

## 2023-04-12 NOTE — ED NOTES
Patient c/o RLQ abdominal pain. States she went to PCP for same and had an outpatient CT performed. Told to come to ER for further evaluation.

## 2023-04-12 NOTE — ED PROVIDER NOTES
Encounter Date: 4/12/2023    SCRIBE #1 NOTE: I, Kirsten Beaulieu, am scribing for, and in the presence of,  Alayna Holdsworth, PA-C. I have scribed the following portions of the note - Other sections scribed: HPI, ROS.     History     Chief Complaint   Patient presents with    Abdominal Pain     Pt reports to the ED with C/O RLQ ABD pain x 3 days. Pt had an outpatient CT done today which showed appendicitis. Pt was instructed to report to the ED for follow up. Pt reports low grade fever at home. Pt denies N/V/D. Pt reports the pain is worse with movement. Pt awaiting QT bed for eval.        Irena Case is a 48 y.o. female, with a PMHx of HTN, fatty liver, and endometriosis, who presents to the ED with intermittent sharp RLQ abdominal pain that began 3 days ago. Patient reports increased pain when moving and walking, stating that this is an 8/10, but when she is stationary that the pain is only a 3/10. Pt states that coughing and touch exacerbates the pain. Pt states that she has had some chills, and that her temperature was 99.6 earlier today. Pt also states she experienced some nausea earlier but that it had resolved upon examination. Pt states she has been constipated, and had a bowel movement earlier that was very hard. Pt reports some myalgias and dizziness. Pt states that she went to her PCP today and received an outpatient CT, which showed appendicitis which is why she was told to come to the ED. Pt took ibuprofen in attempted treatment that provided some relief. No other exacerbating or alleviating factors. Denies trouble breathing, chest pain, rash, headache, vomiting, urgency, frequency, vaginal bleeding or discharge, diarrhea, hematochezia, or other associated symptoms. Past surgical history of hysterectomy.       The history is provided by the patient. No  was used.   Review of patient's allergies indicates:  No Known Allergies  Past Medical History:   Diagnosis Date     Endometriosis     Fatty liver     Hypertension      Past Surgical History:   Procedure Laterality Date    BREAST BIOPSY Left     benign    HYSTERECTOMY      LASIK Bilateral     TOTAL ABDOMINAL HYSTERECTOMY W/ BILATERAL SALPINGOOPHORECTOMY      Pt still has her cervix in place     Family History   Problem Relation Age of Onset    COPD Mother     Asthma Mother     Hypertension Mother     Heart failure Mother     Arthritis Mother     Heart disease Father         s/p quadruple bypass    Cirrhosis Neg Hx      Social History     Tobacco Use    Smoking status: Some Days     Types: Vaping with nicotine    Smokeless tobacco: Never   Substance Use Topics    Alcohol use: Yes     Alcohol/week: 2.0 standard drinks     Types: 2 Glasses of wine per week     Comment: daily    Drug use: Never     Review of Systems   Constitutional:  Positive for chills and fever (99.6). Negative for diaphoresis and fatigue.   Respiratory:  Negative for shortness of breath and wheezing.    Cardiovascular:  Negative for chest pain.   Gastrointestinal:  Positive for abdominal pain (RLQ), constipation and nausea. Negative for blood in stool, diarrhea and vomiting.   Genitourinary:  Negative for decreased urine volume, difficulty urinating, dysuria, flank pain, frequency, hematuria, pelvic pain, urgency, vaginal bleeding, vaginal discharge and vaginal pain.   Musculoskeletal:  Positive for myalgias. Negative for back pain and neck pain.   Skin:  Negative for rash.   Neurological:  Negative for dizziness, weakness, light-headedness and headaches.     Physical Exam     Initial Vitals [04/12/23 1530]   BP Pulse Resp Temp SpO2   113/70 89 18 99.6 °F (37.6 °C) 100 %      MAP       --         Physical Exam    Nursing note and vitals reviewed.  Constitutional: Vital signs are normal. She appears well-developed and well-nourished. She is not diaphoretic. She is active. She does not appear ill. No distress.   HENT:   Head: Normocephalic and atraumatic.   Right  Ear: External ear normal.   Left Ear: External ear normal.   Nose: Nose normal.   Eyes: Conjunctivae, EOM and lids are normal. Pupils are equal, round, and reactive to light. Right eye exhibits no discharge. Left eye exhibits no discharge.   Neck: Neck supple.   Normal range of motion.   Full passive range of motion without pain.     Cardiovascular:  Normal rate and regular rhythm.           Pulmonary/Chest: Effort normal and breath sounds normal. No respiratory distress.   Abdominal: Abdomen is soft. She exhibits no distension. There is abdominal tenderness in the right lower quadrant. There is rebound and guarding. positive psoas sign  Musculoskeletal:         General: Normal range of motion.      Cervical back: Full passive range of motion without pain, normal range of motion and neck supple.     Neurological: She is alert and oriented to person, place, and time. GCS eye subscore is 4. GCS verbal subscore is 5. GCS motor subscore is 6.   Skin: Skin is dry. Capillary refill takes less than 2 seconds.       ED Course   Procedures  Labs Reviewed   CBC W/ AUTO DIFFERENTIAL - Abnormal; Notable for the following components:       Result Value    Gran % 80.1 (*)     Lymph % 11.4 (*)     All other components within normal limits   COMPREHENSIVE METABOLIC PANEL   LIPASE          Imaging Results    None          Medications   sodium chloride 0.9% bolus 1,000 mL 1,000 mL (1,000 mLs Intravenous New Bag 4/12/23 1550)   sodium chloride 0.9% flush 10 mL (has no administration in time range)   ondansetron disintegrating tablet 8 mg (has no administration in time range)   melatonin tablet 6 mg (has no administration in time range)   enoxaparin injection 40 mg (has no administration in time range)   acetaminophen tablet 1,000 mg (has no administration in time range)   oxyCODONE immediate release tablet 5 mg (has no administration in time range)   oxyCODONE immediate release tablet 10 mg (has no administration in time range)    piperacillin-tazobactam (ZOSYN) 4.5 g in dextrose 5 % in water (D5W) 5 % 100 mL IVPB (MB+) (has no administration in time range)   lactated ringers infusion (has no administration in time range)   ibuprofen tablet 800 mg (has no administration in time range)   EScitalopram oxalate tablet 20 mg (has no administration in time range)   labetaloL injection 10 mg (has no administration in time range)   hydrALAZINE injection 10 mg (has no administration in time range)   ketorolac injection 15 mg (15 mg Intravenous Given 4/12/23 1550)     Medical Decision Making:   Initial Assessment:   48 y.o. female, with a PMHx of HTN, fatty liver, and endometriosis, who presents to the ED with intermittent sharp RLQ abdominal pain.  Patient's chart and medical history reviewed.  Differential Diagnosis:   Cholecystitis  Choledocholithiasis  Pancreatitis  Gastritis  GERD  SBO  Colitis  Appendicitis  UTI  Clinical Tests:   Lab Tests: Reviewed and Ordered  ED Management:  Patient's vitals reviewed.  She is afebrile, no respiratory distress, nontoxic-appearing in the ED. Patient had right lower quadrant tenderness palpation, guarding, and positive psoas sign. Patient started on fluids and Toradol for pain. CBC and CMP was unremarkable. Lipase was in normal range, pancreatitis unlikely. UA from this morning was unremarkable.  Outside CT scan from today was reviewed. CT showed  There is diffuse dilation of the appendix, measuring up to 1.6 cm.  There are internal appendicoliths.  There is periappendiceal inflammation and fluid without focal organized fluid collection.  No findings to suggest abscess.  Discussed this case with general surgery Dr. Alysa Becerra who will come and see the patient. Patient will be admitted to general surgery for acute appendicitis to Dr. Moore.         Scribe Attestation:   Scribe #1: I performed the above scribed service and the documentation accurately describes the services I performed. I attest to the  accuracy of the note.                 I, Alayna Holdsworth,PA-C, personally performed the services described in this documentation. All medical record entries made by the scribe were at my direction and in my presence.  I have reviewed the chart and agree that the record reflects my personal performance and is accurate and complete.    Clinical Impression:   Final diagnoses:  [K35.80] Acute appendicitis        ED Disposition Condition    Observation                 Alayna Holdsworth, PA-C  04/12/23 7443

## 2023-04-12 NOTE — CONSULTS
Please see consult note dated 4/12/23 for full H&P.    Alysa Becerra MD  Pager: [phone]  General Surgery PGY-4  [facility]

## 2023-04-12 NOTE — H&P
Please see consult note dated 4/12/23 for full H&P.    Alysa Becerra MD  Pager: [phone]  General Surgery PGY-4  [facility]

## 2023-04-12 NOTE — PLAN OF CARE
[facility] - Emergency Dept  Discharge Assessment    SW completed brief assessment and discussed discharge planning with patient and her  Abdirashid at her bedside. Patient stated that she lives with her  who is her main support. Patient's  will provide transportation for her to get home when discharge from the hospital.    Primary Care Provider: Sue Hansen MD     Discharge Assessment (most recent)       BRIEF DISCHARGE ASSESSMENT - 04/12/23 4957          Discharge Planning    Assessment Type Discharge Planning Brief Assessment     Resource/Environmental Concerns none     Support Systems Spouse/significant other     Equipment Currently Used at Home none     Current Living Arrangements home     Patient/Family Anticipates Transition to home with family     Patient/Family Anticipated Services at Transition none     DME Needed Upon Discharge  none     Discharge Plan A Home with family     Discharge Plan B Home with family        Physical Activity    On average, how many minutes do you engage in exercise at this level? 150+ min

## 2023-04-12 NOTE — TELEPHONE ENCOUNTER
Addressed in separate encounter. Patient advised to report to ER for appendicitis. She verbalized understanding.

## 2023-04-13 ENCOUNTER — ANESTHESIA EVENT (OUTPATIENT)
Dept: SURGERY | Facility: HOSPITAL | Age: 48
End: 2023-04-13
Payer: OTHER GOVERNMENT

## 2023-04-13 ENCOUNTER — ANESTHESIA (OUTPATIENT)
Dept: SURGERY | Facility: HOSPITAL | Age: 48
End: 2023-04-13
Payer: OTHER GOVERNMENT

## 2023-04-13 PROBLEM — K35.30 ACUTE APPENDICITIS WITH LOCALIZED PERITONITIS, WITHOUT PERFORATION, ABSCESS, OR GANGRENE: Status: ACTIVE | Noted: 2023-04-13

## 2023-04-13 LAB
ANION GAP SERPL CALC-SCNC: 6 MMOL/L (ref 8–16)
BASOPHILS # BLD AUTO: 0.04 K/UL (ref 0–0.2)
BASOPHILS NFR BLD: 0.5 % (ref 0–1.9)
BUN SERPL-MCNC: 13 MG/DL (ref 6–20)
CALCIUM SERPL-MCNC: 8.4 MG/DL (ref 8.7–10.5)
CHLORIDE SERPL-SCNC: 103 MMOL/L (ref 95–110)
CO2 SERPL-SCNC: 27 MMOL/L (ref 23–29)
CREAT SERPL-MCNC: 0.8 MG/DL (ref 0.5–1.4)
DIFFERENTIAL METHOD: ABNORMAL
EOSINOPHIL # BLD AUTO: 0.2 K/UL (ref 0–0.5)
EOSINOPHIL NFR BLD: 2.1 % (ref 0–8)
ERYTHROCYTE [DISTWIDTH] IN BLOOD BY AUTOMATED COUNT: 12.7 % (ref 11.5–14.5)
EST. GFR  (NO RACE VARIABLE): >60 ML/MIN/1.73 M^2
GLUCOSE SERPL-MCNC: 98 MG/DL (ref 70–110)
HCT VFR BLD AUTO: 32.2 % (ref 37–48.5)
HGB BLD-MCNC: 10.2 G/DL (ref 12–16)
IMM GRANULOCYTES # BLD AUTO: 0.02 K/UL (ref 0–0.04)
IMM GRANULOCYTES NFR BLD AUTO: 0.3 % (ref 0–0.5)
LYMPHOCYTES # BLD AUTO: 1.7 K/UL (ref 1–4.8)
LYMPHOCYTES NFR BLD: 21.5 % (ref 18–48)
MAGNESIUM SERPL-MCNC: 1.8 MG/DL (ref 1.6–2.6)
MCH RBC QN AUTO: 29.7 PG (ref 27–31)
MCHC RBC AUTO-ENTMCNC: 31.7 G/DL (ref 32–36)
MCV RBC AUTO: 94 FL (ref 82–98)
MONOCYTES # BLD AUTO: 0.7 K/UL (ref 0.3–1)
MONOCYTES NFR BLD: 8.6 % (ref 4–15)
NEUTROPHILS # BLD AUTO: 5.2 K/UL (ref 1.8–7.7)
NEUTROPHILS NFR BLD: 67 % (ref 38–73)
NRBC BLD-RTO: 0 /100 WBC
PHOSPHATE SERPL-MCNC: 4.3 MG/DL (ref 2.7–4.5)
PLATELET # BLD AUTO: 202 K/UL (ref 150–450)
PMV BLD AUTO: 10 FL (ref 9.2–12.9)
POTASSIUM SERPL-SCNC: 3.9 MMOL/L (ref 3.5–5.1)
RBC # BLD AUTO: 3.44 M/UL (ref 4–5.4)
SODIUM SERPL-SCNC: 136 MMOL/L (ref 136–145)
WBC # BLD AUTO: 7.78 K/UL (ref 3.9–12.7)

## 2023-04-13 PROCEDURE — 63600175 PHARM REV CODE 636 W HCPCS: Performed by: STUDENT IN AN ORGANIZED HEALTH CARE EDUCATION/TRAINING PROGRAM

## 2023-04-13 PROCEDURE — 63600175 PHARM REV CODE 636 W HCPCS: Performed by: ANESTHESIOLOGY

## 2023-04-13 PROCEDURE — 63600175 PHARM REV CODE 636 W HCPCS: Performed by: NURSE ANESTHETIST, CERTIFIED REGISTERED

## 2023-04-13 PROCEDURE — 36000708 HC OR TIME LEV III 1ST 15 MIN: Performed by: SURGERY

## 2023-04-13 PROCEDURE — 88304 PR  SURG PATH,LEVEL III: ICD-10-PCS | Mod: 26,,, | Performed by: PATHOLOGY

## 2023-04-13 PROCEDURE — 71000033 HC RECOVERY, INTIAL HOUR: Performed by: SURGERY

## 2023-04-13 PROCEDURE — 96372 THER/PROPH/DIAG INJ SC/IM: CPT | Mod: 59 | Performed by: STUDENT IN AN ORGANIZED HEALTH CARE EDUCATION/TRAINING PROGRAM

## 2023-04-13 PROCEDURE — 25000003 PHARM REV CODE 250: Performed by: NURSE ANESTHETIST, CERTIFIED REGISTERED

## 2023-04-13 PROCEDURE — 88304 TISSUE EXAM BY PATHOLOGIST: CPT | Performed by: PATHOLOGY

## 2023-04-13 PROCEDURE — 25000003 PHARM REV CODE 250: Performed by: SURGERY

## 2023-04-13 PROCEDURE — 80048 BASIC METABOLIC PNL TOTAL CA: CPT | Performed by: STUDENT IN AN ORGANIZED HEALTH CARE EDUCATION/TRAINING PROGRAM

## 2023-04-13 PROCEDURE — 85025 COMPLETE CBC W/AUTO DIFF WBC: CPT | Performed by: STUDENT IN AN ORGANIZED HEALTH CARE EDUCATION/TRAINING PROGRAM

## 2023-04-13 PROCEDURE — 00840 ANES IPER PX LOWER ABD NOS: CPT | Performed by: SURGERY

## 2023-04-13 PROCEDURE — 96361 HYDRATE IV INFUSION ADD-ON: CPT

## 2023-04-13 PROCEDURE — 27201423 OPTIME MED/SURG SUP & DEVICES STERILE SUPPLY: Performed by: SURGERY

## 2023-04-13 PROCEDURE — 37000009 HC ANESTHESIA EA ADD 15 MINS: Performed by: SURGERY

## 2023-04-13 PROCEDURE — 88304 TISSUE EXAM BY PATHOLOGIST: CPT | Mod: 26,,, | Performed by: PATHOLOGY

## 2023-04-13 PROCEDURE — 37000008 HC ANESTHESIA 1ST 15 MINUTES: Performed by: SURGERY

## 2023-04-13 PROCEDURE — 44970 PR LAP,APPENDECTOMY: ICD-10-PCS | Mod: ,,, | Performed by: SURGERY

## 2023-04-13 PROCEDURE — D9220A PRA ANESTHESIA: ICD-10-PCS | Mod: CRNA,,, | Performed by: NURSE ANESTHETIST, CERTIFIED REGISTERED

## 2023-04-13 PROCEDURE — 83735 ASSAY OF MAGNESIUM: CPT | Performed by: STUDENT IN AN ORGANIZED HEALTH CARE EDUCATION/TRAINING PROGRAM

## 2023-04-13 PROCEDURE — 25000003 PHARM REV CODE 250: Performed by: STUDENT IN AN ORGANIZED HEALTH CARE EDUCATION/TRAINING PROGRAM

## 2023-04-13 PROCEDURE — G0378 HOSPITAL OBSERVATION PER HR: HCPCS

## 2023-04-13 PROCEDURE — 44970 LAPAROSCOPY APPENDECTOMY: CPT | Mod: ,,, | Performed by: SURGERY

## 2023-04-13 PROCEDURE — D9220A PRA ANESTHESIA: Mod: ANES,,, | Performed by: ANESTHESIOLOGY

## 2023-04-13 PROCEDURE — D9220A PRA ANESTHESIA: ICD-10-PCS | Mod: ANES,,, | Performed by: ANESTHESIOLOGY

## 2023-04-13 PROCEDURE — 36415 COLL VENOUS BLD VENIPUNCTURE: CPT | Performed by: STUDENT IN AN ORGANIZED HEALTH CARE EDUCATION/TRAINING PROGRAM

## 2023-04-13 PROCEDURE — D9220A PRA ANESTHESIA: Mod: CRNA,,, | Performed by: NURSE ANESTHETIST, CERTIFIED REGISTERED

## 2023-04-13 PROCEDURE — 84100 ASSAY OF PHOSPHORUS: CPT | Performed by: STUDENT IN AN ORGANIZED HEALTH CARE EDUCATION/TRAINING PROGRAM

## 2023-04-13 PROCEDURE — 96366 THER/PROPH/DIAG IV INF ADDON: CPT

## 2023-04-13 PROCEDURE — 36000709 HC OR TIME LEV III EA ADD 15 MIN: Performed by: SURGERY

## 2023-04-13 RX ORDER — IBUPROFEN 400 MG/1
800 TABLET ORAL EVERY 8 HOURS
Status: DISCONTINUED | OUTPATIENT
Start: 2023-04-13 | End: 2023-04-14 | Stop reason: HOSPADM

## 2023-04-13 RX ORDER — BUPIVACAINE HYDROCHLORIDE 2.5 MG/ML
INJECTION, SOLUTION INFILTRATION; PERINEURAL
Status: DISCONTINUED | OUTPATIENT
Start: 2023-04-13 | End: 2023-04-13 | Stop reason: HOSPADM

## 2023-04-13 RX ORDER — PROPOFOL 10 MG/ML
VIAL (ML) INTRAVENOUS
Status: DISCONTINUED | OUTPATIENT
Start: 2023-04-13 | End: 2023-04-13

## 2023-04-13 RX ORDER — PHENYLEPHRINE HYDROCHLORIDE 10 MG/ML
INJECTION INTRAVENOUS
Status: DISCONTINUED | OUTPATIENT
Start: 2023-04-13 | End: 2023-04-13

## 2023-04-13 RX ORDER — ROCURONIUM BROMIDE 10 MG/ML
INJECTION, SOLUTION INTRAVENOUS
Status: DISCONTINUED | OUTPATIENT
Start: 2023-04-13 | End: 2023-04-13

## 2023-04-13 RX ORDER — ACETAMINOPHEN 10 MG/ML
1000 INJECTION, SOLUTION INTRAVENOUS ONCE
Status: COMPLETED | OUTPATIENT
Start: 2023-04-13 | End: 2023-04-13

## 2023-04-13 RX ORDER — SODIUM CHLORIDE 0.9 % (FLUSH) 0.9 %
10 SYRINGE (ML) INJECTION
Status: DISCONTINUED | OUTPATIENT
Start: 2023-04-13 | End: 2023-04-14 | Stop reason: HOSPADM

## 2023-04-13 RX ORDER — LIDOCAINE HYDROCHLORIDE 20 MG/ML
INJECTION INTRAVENOUS
Status: DISCONTINUED | OUTPATIENT
Start: 2023-04-13 | End: 2023-04-13

## 2023-04-13 RX ORDER — FENTANYL CITRATE 50 UG/ML
INJECTION, SOLUTION INTRAMUSCULAR; INTRAVENOUS
Status: DISCONTINUED | OUTPATIENT
Start: 2023-04-13 | End: 2023-04-13

## 2023-04-13 RX ORDER — DEXAMETHASONE SODIUM PHOSPHATE 4 MG/ML
INJECTION, SOLUTION INTRA-ARTICULAR; INTRALESIONAL; INTRAMUSCULAR; INTRAVENOUS; SOFT TISSUE
Status: DISCONTINUED | OUTPATIENT
Start: 2023-04-13 | End: 2023-04-13

## 2023-04-13 RX ORDER — ACETAMINOPHEN 10 MG/ML
1000 INJECTION, SOLUTION INTRAVENOUS ONCE
Status: DISCONTINUED | OUTPATIENT
Start: 2023-04-13 | End: 2023-04-13

## 2023-04-13 RX ORDER — MIDAZOLAM HYDROCHLORIDE 1 MG/ML
INJECTION, SOLUTION INTRAMUSCULAR; INTRAVENOUS
Status: DISCONTINUED | OUTPATIENT
Start: 2023-04-13 | End: 2023-04-13

## 2023-04-13 RX ORDER — HYDROMORPHONE HYDROCHLORIDE 2 MG/ML
0.2 INJECTION, SOLUTION INTRAMUSCULAR; INTRAVENOUS; SUBCUTANEOUS EVERY 5 MIN PRN
Status: DISCONTINUED | OUTPATIENT
Start: 2023-04-13 | End: 2023-04-14 | Stop reason: HOSPADM

## 2023-04-13 RX ORDER — ONDANSETRON 2 MG/ML
INJECTION INTRAMUSCULAR; INTRAVENOUS
Status: DISCONTINUED | OUTPATIENT
Start: 2023-04-13 | End: 2023-04-13

## 2023-04-13 RX ORDER — EPHEDRINE SULFATE 50 MG/ML
INJECTION, SOLUTION INTRAVENOUS
Status: DISCONTINUED | OUTPATIENT
Start: 2023-04-13 | End: 2023-04-13

## 2023-04-13 RX ADMIN — PROPOFOL 110 MG: 10 INJECTION, EMULSION INTRAVENOUS at 01:04

## 2023-04-13 RX ADMIN — FENTANYL CITRATE 50 MCG: 50 INJECTION, SOLUTION INTRAMUSCULAR; INTRAVENOUS at 01:04

## 2023-04-13 RX ADMIN — PIPERACILLIN AND TAZOBACTAM 4.5 G: 4; .5 INJECTION, POWDER, LYOPHILIZED, FOR SOLUTION INTRAVENOUS; PARENTERAL at 01:04

## 2023-04-13 RX ADMIN — SODIUM CHLORIDE, SODIUM LACTATE, POTASSIUM CHLORIDE, AND CALCIUM CHLORIDE: .6; .31; .03; .02 INJECTION, SOLUTION INTRAVENOUS at 01:04

## 2023-04-13 RX ADMIN — EPHEDRINE SULFATE 10 MG: 50 INJECTION INTRAVENOUS at 02:04

## 2023-04-13 RX ADMIN — ROCURONIUM BROMIDE 50 MG: 10 INJECTION, SOLUTION INTRAVENOUS at 01:04

## 2023-04-13 RX ADMIN — PIPERACILLIN AND TAZOBACTAM 4.5 G: 4; .5 INJECTION, POWDER, LYOPHILIZED, FOR SOLUTION INTRAVENOUS; PARENTERAL at 08:04

## 2023-04-13 RX ADMIN — HYDROMORPHONE HYDROCHLORIDE 0.2 MG: 2 INJECTION, SOLUTION INTRAMUSCULAR; INTRAVENOUS; SUBCUTANEOUS at 03:04

## 2023-04-13 RX ADMIN — ACETAMINOPHEN 1000 MG: 500 TABLET, FILM COATED ORAL at 09:04

## 2023-04-13 RX ADMIN — LIDOCAINE HYDROCHLORIDE 80 MG: 20 INJECTION, SOLUTION INTRAVENOUS at 01:04

## 2023-04-13 RX ADMIN — DEXAMETHASONE SODIUM PHOSPHATE 4 MG: 4 INJECTION, SOLUTION INTRAMUSCULAR; INTRAVENOUS at 01:04

## 2023-04-13 RX ADMIN — ONDANSETRON 4 MG: 2 INJECTION, SOLUTION INTRAMUSCULAR; INTRAVENOUS at 02:04

## 2023-04-13 RX ADMIN — IBUPROFEN 800 MG: 400 TABLET ORAL at 09:04

## 2023-04-13 RX ADMIN — ESCITALOPRAM OXALATE 20 MG: 10 TABLET ORAL at 08:04

## 2023-04-13 RX ADMIN — HYDROMORPHONE HYDROCHLORIDE 0.2 MG: 2 INJECTION, SOLUTION INTRAMUSCULAR; INTRAVENOUS; SUBCUTANEOUS at 02:04

## 2023-04-13 RX ADMIN — PHENYLEPHRINE HYDROCHLORIDE 50 MCG: 10 INJECTION INTRAVENOUS at 02:04

## 2023-04-13 RX ADMIN — ACETAMINOPHEN 1000 MG: 10 INJECTION INTRAVENOUS at 02:04

## 2023-04-13 RX ADMIN — SODIUM CHLORIDE, SODIUM LACTATE, POTASSIUM CHLORIDE, AND CALCIUM CHLORIDE: .6; .31; .03; .02 INJECTION, SOLUTION INTRAVENOUS at 02:04

## 2023-04-13 RX ADMIN — OXYCODONE HYDROCHLORIDE 5 MG: 5 TABLET ORAL at 08:04

## 2023-04-13 RX ADMIN — OXYCODONE 10 MG: 5 TABLET ORAL at 03:04

## 2023-04-13 RX ADMIN — OXYCODONE HYDROCHLORIDE 5 MG: 5 TABLET ORAL at 03:04

## 2023-04-13 RX ADMIN — SUGAMMADEX 200 MG: 100 INJECTION, SOLUTION INTRAVENOUS at 02:04

## 2023-04-13 RX ADMIN — MIDAZOLAM HYDROCHLORIDE 2 MG: 1 INJECTION, SOLUTION INTRAMUSCULAR; INTRAVENOUS at 01:04

## 2023-04-13 RX ADMIN — ENOXAPARIN SODIUM 40 MG: 40 INJECTION SUBCUTANEOUS at 04:04

## 2023-04-13 NOTE — NURSING
[facility] [place]urses [place] -- 4 Eyes      4/13/2023       Skin assessed on: Q Shift      [x] No Pressure Injuries Present    []Prevention Measures Documented    [] Yes LDA  for Pressure Injury Previously documented     [] Yes New Pressure Injury Discovered   [] LDA for New Pressure Injury Added      Attending RN:  Emperatriz Concepcion RN     Second RN:  Luc VYAS RN

## 2023-04-13 NOTE — HPI
Ms Case is a 49yo F w/PMH depression, HTN, fatty liver who presents to the ED with two days of RLQ abdominal pain. Patient states that on Monday she developed RLQ abdominal pain. The pain worsened yesterday and she had associated nausea and low grade fevers. She describes the pain as sharp without radiation. She went to see her PCP this morning who ordered labs and a CT scan. She denies dysuria, hematuria, hematochezia, changes to bowel habits. Last BM was today and she is passing gas.     No history of MI or stroke, not on anticoagulation.     Work up showed a dilated appendix with periappendiceal inflammation. WBC within normal limits.     PSH: open hysterectomy, lap tubal ligation  Social: drinks 1-5 glasses of wine per day but has never had withdrawals, current vaper, denies IVDU

## 2023-04-13 NOTE — SUBJECTIVE & OBJECTIVE
Interval History: No acute events overnight. Afebrile. VSS. Still having RLQ abdominal tenderness but controlled with PO pain meds.    Medications:  Continuous Infusions:   lactated ringers 100 mL/hr at 04/12/23 2054     Scheduled Meds:   acetaminophen  1,000 mg Oral Q8H    enoxaparin  40 mg Subcutaneous Daily    EScitalopram oxalate  20 mg Oral Daily    piperacillin-tazobactam (ZOSYN) IVPB  4.5 g Intravenous Q8H     PRN Meds:hydrALAZINE, ibuprofen, labetalol, melatonin, ondansetron, oxyCODONE, oxyCODONE, sodium chloride 0.9%     Review of patient's allergies indicates:  No Known Allergies  Objective:     Vital Signs (Most Recent):  Temp: 98.4 °F (36.9 °C) (04/13/23 0721)  Pulse: 90 (04/13/23 0721)  Resp: 17 (04/13/23 0721)  BP: 109/61 (04/13/23 0721)  SpO2: 98 % (04/13/23 0721) Vital Signs (24h Range):  Temp:  [98.4 °F (36.9 °C)-99.6 °F (37.6 °C)] 98.4 °F (36.9 °C)  Pulse:  [73-91] 90  Resp:  [17-19] 17  SpO2:  [95 %-100 %] 98 %  BP: (100-132)/(58-79) 109/61     Weight: 61 kg (134 lb 7.7 oz)  Body mass index is 24.6 kg/m².    Intake/Output - Last 3 Shifts         04/11 0700 04/12 0659 04/12 0700 04/13 0659 04/13 0700 04/14 0659    P.O.   240    IV Piggyback  1000     Total Intake(mL/kg)  1000 (16.4) 240 (3.9)    Net  +1000 +240           Urine Occurrence   1 x    Stool Occurrence   0 x            Physical Exam  Constitutional:       Appearance: Normal appearance.   HENT:      Head: Normocephalic and atraumatic.   Cardiovascular:      Rate and Rhythm: Normal rate.   Pulmonary:      Effort: Pulmonary effort is normal.   Abdominal:      General: There is no distension.      Palpations: Abdomen is soft.      Comments: RLQ tenderness to palpation   Skin:     General: Skin is warm and dry.      Capillary Refill: Capillary refill takes less than 2 seconds.   Neurological:      General: No focal deficit present.      Mental Status: She is alert.       Significant Labs:  I have reviewed all pertinent lab results within  the past 24 hours.  CBC:   Recent Labs   Lab 04/13/23  0359   WBC 7.78   RBC 3.44*   HGB 10.2*   HCT 32.2*      MCV 94   MCH 29.7   MCHC 31.7*     BMP:   Recent Labs   Lab 04/13/23  0359   GLU 98      K 3.9      CO2 27   BUN 13   CREATININE 0.8   CALCIUM 8.4*   MG 1.8       Significant Diagnostics:  I have reviewed all pertinent imaging results/findings within the past 24 hours.

## 2023-04-13 NOTE — ANESTHESIA PREPROCEDURE EVALUATION
04/13/2023    Pre-operative evaluation for Procedure(s) (LRB):  APPENDECTOMY, LAPAROSCOPIC (N/A)    Irena Case is a 48 y.o. female     Patient Active Problem List   Diagnosis    Hypertension    Endometriosis    Moderate episode of recurrent major depressive disorder    Elevated transaminase level    Fatty liver    Hepatic cyst    Positive ELIJAH (antinuclear antibody)    Elevated liver enzymes    Acute appendicitis with localized peritonitis, without perforation, abscess, or gangrene       Review of patient's allergies indicates:  No Known Allergies    No current facility-administered medications on file prior to encounter.     Current Outpatient Medications on File Prior to Encounter   Medication Sig Dispense Refill    EScitalopram oxalate (LEXAPRO) 20 MG tablet TAKE ONE TABLET BY MOUTH ONCE DAILY FOR MOOD Strength: 20 mg 90 tablet 1    hydroCHLOROthiazide (HYDRODIURIL) 25 MG tablet TAKE ONE TABLET BY MOUTH ONCE DAILY 90 tablet 0    lisinopriL (PRINIVIL,ZESTRIL) 40 MG tablet TAKE ONE TABLET BY MOUTH ONCE DAILY 90 tablet 0       Past Surgical History:   Procedure Laterality Date    BREAST BIOPSY Left     benign    HYSTERECTOMY      LASIK Bilateral     TOTAL ABDOMINAL HYSTERECTOMY W/ BILATERAL SALPINGOOPHORECTOMY      Pt still has her cervix in place       Social History     Socioeconomic History    Marital status:    Tobacco Use    Smoking status: Some Days     Types: Vaping with nicotine    Smokeless tobacco: Never   Substance and Sexual Activity    Alcohol use: Yes     Alcohol/week: 2.0 standard drinks     Types: 2 Glasses of wine per week     Comment: daily    Drug use: Never   Social History Narrative    ** Merged History Encounter **          Social Determinants of Health     Physical Activity: Unknown    Minutes of Exercise per Session: 150+ min         CBC:   Recent Labs      04/12/23  1550 04/13/23  0359   WBC 9.58 7.78   RBC 4.14 3.44*   HGB 12.6 10.2*   HCT 37.9 32.2*    202   MCV 92 94   MCH 30.4 29.7   MCHC 33.2 31.7*       CMP:   Recent Labs     04/12/23  1024 04/12/23  1550 04/13/23  0359    137 136   K 4.6 4.4 3.9    99 103   CO2 26 28 27   BUN 11 10 13   CREATININE 0.7 0.8 0.8    103 98   MG  --   --  1.8   PHOS  --   --  4.3   CALCIUM 9.9 10.0 8.4*   ALBUMIN 4.5 4.3  --    PROT 7.8 7.6  --    ALKPHOS 61 65  --    ALT 21 20  --    AST 26 21  --    BILITOT 0.9 0.7  --        Pre-op Assessment          Review of Systems  Cardiovascular:   Hypertension    Hepatic/GI:   Acute appendicitis    Psych:   Psychiatric History depression          Physical Exam  General: Well nourished, Cooperative and Alert    Airway:  Mallampati: I   Mouth Opening: Normal  Tongue: Normal  Neck ROM: Normal ROM    Dental:  Intact    Chest/Lungs:  Normal Respiratory Rate    Heart:  Rate: Normal  Rhythm: Regular Rhythm        Anesthesia Plan  Type of Anesthesia, risks & benefits discussed:    Anesthesia Type: Gen ETT  Intra-op Monitoring Plan: Standard ASA Monitors  Post Op Pain Control Plan: multimodal analgesia  Induction:  IV  Informed Consent: Informed consent signed with the Patient and all parties understand the risks and agree with anesthesia plan.  All questions answered.   ASA Score: 2    Ready For Surgery From Anesthesia Perspective.     .

## 2023-04-13 NOTE — PLAN OF CARE
Problem: Adult Inpatient Plan of Care  Goal: Plan of Care Review  Outcome: Ongoing, Progressing  Goal: Patient-Specific Goal (Individualized)  Outcome: Ongoing, Progressing  Goal: Absence of Hospital-Acquired Illness or Injury  Outcome: Ongoing, Progressing  Goal: Optimal Comfort and Wellbeing  Outcome: Ongoing, Progressing  Goal: Readiness for Transition of Care  Outcome: Ongoing, Progressing     Problem: Pain Acute  Goal: Acceptable Pain Control and Functional Ability  Outcome: Ongoing, Progressing  Intervention: Develop Pain Management Plan  Flowsheets (Taken 4/13/2023 1334)  Pain Management Interventions:   around-the-clock dosing utilized   medication offered   pain management plan reviewed with patient/caregiver  Intervention: Prevent or Manage Pain  Flowsheets (Taken 4/13/2023 1336)  Sleep/Rest Enhancement: regular sleep/rest pattern promoted  Medication Review/Management: medications reviewed  Intervention: Optimize Psychosocial Wellbeing  Flowsheets (Taken 4/13/2023 1338)  Supportive Measures: active listening utilized  Diversional Activities:   television   smartphone

## 2023-04-13 NOTE — PROGRESS NOTES
[facility] Surg  General Surgery  Progress Note    Subjective:     History of Present Illness:  Ms Case is a 47yo F w/PMH depression, HTN, fatty liver who presents to the ED with two days of RLQ abdominal pain. Patient states that on Monday she developed RLQ abdominal pain. The pain worsened yesterday and she had associated nausea and low grade fevers. She describes the pain as sharp without radiation. She went to see her PCP this morning who ordered labs and a CT scan. She denies dysuria, hematuria, hematochezia, changes to bowel habits. Last BM was today and she is passing gas.     No history of MI or stroke, not on anticoagulation.     Work up showed a dilated appendix with periappendiceal inflammation. WBC within normal limits.     PSH: open hysterectomy, lap tubal ligation  Social: drinks 1-5 glasses of wine per day but has never had withdrawals, current vaper, denies IVDU      Post-Op Info:  Procedure(s) (LRB):  APPENDECTOMY, LAPAROSCOPIC (N/A)         Interval History: No acute events overnight. Afebrile. VSS. Still having RLQ abdominal tenderness but controlled with PO pain meds.    Medications:  Continuous Infusions:   lactated ringers 100 mL/hr at 04/12/23 2054     Scheduled Meds:   acetaminophen  1,000 mg Oral Q8H    enoxaparin  40 mg Subcutaneous Daily    EScitalopram oxalate  20 mg Oral Daily    piperacillin-tazobactam (ZOSYN) IVPB  4.5 g Intravenous Q8H     PRN Meds:hydrALAZINE, ibuprofen, labetalol, melatonin, ondansetron, oxyCODONE, oxyCODONE, sodium chloride 0.9%     Review of patient's allergies indicates:  No Known Allergies  Objective:     Vital Signs (Most Recent):  Temp: 98.4 °F (36.9 °C) (04/13/23 0721)  Pulse: 90 (04/13/23 0721)  Resp: 17 (04/13/23 0721)  BP: 109/61 (04/13/23 0721)  SpO2: 98 % (04/13/23 0721) Vital Signs (24h Range):  Temp:  [98.4 °F (36.9 °C)-99.6 °F (37.6 °C)] 98.4 °F (36.9 °C)  Pulse:  [73-91] 90  Resp:  [17-19] 17  SpO2:  [95 %-100 %] 98 %  BP:  (100-132)/(58-79) 109/61     Weight: 61 kg (134 lb 7.7 oz)  Body mass index is 24.6 kg/m².    Intake/Output - Last 3 Shifts         04/11 0700  04/12 0659 04/12 0700  04/13 0659 04/13 0700  04/14 0659    P.O.   240    IV Piggyback  1000     Total Intake(mL/kg)  1000 (16.4) 240 (3.9)    Net  +1000 +240           Urine Occurrence   1 x    Stool Occurrence   0 x            Physical Exam  Constitutional:       Appearance: Normal appearance.   HENT:      Head: Normocephalic and atraumatic.   Cardiovascular:      Rate and Rhythm: Normal rate.   Pulmonary:      Effort: Pulmonary effort is normal.   Abdominal:      General: There is no distension.      Palpations: Abdomen is soft.      Comments: RLQ tenderness to palpation   Skin:     General: Skin is warm and dry.      Capillary Refill: Capillary refill takes less than 2 seconds.   Neurological:      General: No focal deficit present.      Mental Status: She is alert.       Significant Labs:  I have reviewed all pertinent lab results within the past 24 hours.  CBC:   Recent Labs   Lab 04/13/23  0359   WBC 7.78   RBC 3.44*   HGB 10.2*   HCT 32.2*      MCV 94   MCH 29.7   MCHC 31.7*     BMP:   Recent Labs   Lab 04/13/23  0359   GLU 98      K 3.9      CO2 27   BUN 13   CREATININE 0.8   CALCIUM 8.4*   MG 1.8       Significant Diagnostics:  I have reviewed all pertinent imaging results/findings within the past 24 hours.    Assessment/Plan:     * Acute appendicitis with localized peritonitis, without perforation, abscess, or gangrene  47yo F w/PMH HTN, depression, fatty liver who presents to the ED with acute appendicitis     - NPO  - IV zosyn  - IVF  - daily labs  - PRN pain/nausea meds  - encourage ambulation  - home lexapro  - PRN labetalol and hydralazine for HTN, hold home lisinopril and hydrochlorothiazide  - to OR today for lap appendectomy, consent obtained and in chart      Alysa Becerra MD  General Surgery  [facility] - Med Surg

## 2023-04-13 NOTE — TRANSFER OF CARE
"Anesthesia Transfer of Care Note    Patient: Irena Case    Procedure(s) Performed: Procedure(s) (LRB):  APPENDECTOMY, LAPAROSCOPIC (N/A)    Patient location: PACU    Anesthesia Type: general    Transport from OR: Transported from OR on 6-10 L/min O2 by face mask with adequate spontaneous ventilation    Post pain: adequate analgesia    Post assessment: no apparent anesthetic complications and tolerated procedure well    Post vital signs: stable    Level of consciousness: lethargic and responds to stimulation    Nausea/Vomiting: no nausea/vomiting    Complications: none    Transfer of care protocol was followed      Last vitals:   Visit Vitals  /74 (BP Location: Left arm, Patient Position: Lying)   Pulse 78   Temp 36.9 °C (98.4 °F) (Oral)   Resp 12   Ht 5' 2" (1.575 m)   Wt 61 kg (134 lb 7.7 oz)   SpO2 100%   Breastfeeding No   BMI 24.60 kg/m²     "

## 2023-04-13 NOTE — ASSESSMENT & PLAN NOTE
47yo F w/PMH HTN, depression, fatty liver who presents to the ED with acute appendicitis     - NPO  - IV zosyn  - IVF  - daily labs  - PRN pain/nausea meds  - encourage ambulation  - home lexapro  - PRN labetalol and hydralazine for HTN, hold home lisinopril and hydrochlorothiazide  - to OR today for lap appendectomy, consent obtained and in chart

## 2023-04-13 NOTE — NURSING
Pt back to unit from surgery by stretcher via transport. IV access in place, saline locked. NAD or pain noted.

## 2023-04-13 NOTE — ANESTHESIA PROCEDURE NOTES
Intubation    Date/Time: 4/13/2023 1:41 PM  Performed by: Juliocesar Vaz CRNA  Authorized by: Raina Cerda MD     Intubation:     Induction:  Intravenous    Intubated:  Postinduction    Mask Ventilation:  Easy mask    Attempts:  1    Attempted By:  CRNA and student    Method of Intubation:  Direct and video laryngoscopy    Blade:  Chavez 3    Laryngeal View Grade: Grade I - full view of cords      Difficult Airway Encountered?: No      Complications:  None    Airway Device:  Oral endotracheal tube    Airway Device Size:  6.5    Style/Cuff Inflation:  Cuffed    Inflation Amount (mL):  6    Tube secured:  21    Secured at:  The lips    Placement Verified By:  Capnometry    Complicating Factors:  None    Findings Post-Intubation:  BS equal bilateral and atraumatic/condition of teeth unchanged

## 2023-04-13 NOTE — OP NOTE
[facility] Surg  Operative Note    SUMMARY     Surgery Date: 4/13/2023     Surgeon(s) and Role:     * Niall Benjamin MD - Primary    Assisting Surgeon: Alysa Becerra MD    Pre-op Diagnosis:  Acute appendicitis [K35.80]    Post-op Diagnosis:  Post-Op Diagnosis Codes:     * Acute appendicitis [K35.80]    Procedure(s) (LRB):  APPENDECTOMY, LAPAROSCOPIC (N/A)    Anesthesia: General    Indication for procedure: Irena Case is 48 y.o. female with appendicitis. After discussion of disease process, we discussed options and have elected for operation to perform lap vs open appendectomy.    Description of Procedure: After consent was obtained, patient was taken to the OR. The abdomen was prepped and draped in a standard sterile fashion after general anesthesia was started. Time out was performed. Antibiotics were started with zosyn. We began the procedure by making a supraumbilical incision and cutting down to enter the peritoneal cavity. We inserted a Gina trocar, and achieved pneumoperitoneum. We placed 5 mm trocars in the LLQ and suprapubic area. The appendix was inflamed but no abscess and no necrosis/rupture. We used a white load stapler to transect the mesoappendix and a blue load for the base of the appendix. We removed the appendix with an endocatch bag and evacuated the pneumoperitoneum and removed the trocars. The fascia was closed at umbilicus with 0 ethibond sutures, and skin closed with 4-0 monocryl and dermabond for the skin.  All counts were correct x2. Patient was awakened from anesthesia and taken to the recovery room in a stable condition having suffered no issues at this time.    Description of the findings of the procedure:   Acute appendicitis    Estimated Blood Loss: * No values recorded between 4/13/2023 12:00 AM and 4/13/2023  2:30 PM *         Specimens:   Specimen (24h ago, onward)       Start     Ordered    04/13/23 1416  Specimen to Pathology, Surgery General Surgery  Once         Comments: Pre-op Diagnosis: Acute appendicitis [K35.80]Procedure(s):APPENDECTOMY, LAPAROSCOPIC Number of specimens: 1Name of specimens: appendix     References:    Click here for ordering Quick Tip   Question Answer Comment   Procedure Type: General Surgery    Which provider would you like to cc? ANDREW BALLARD    Release to patient Immediate        04/13/23 1417                    Drains/Implants: None

## 2023-04-13 NOTE — NURSING
Received report from off going nurse, Helen. Pt received lying in bed resting quietly with a visitor at the bedside. Pt AAO X 3. Resp even and unlabored. 20 g SL noted to the pts RFA. Zosyn infusing at 25 ml/h. Pt is wearing tele box # [id number] and it is visible on the screen. Alarms audible. Bed is locked in the lowest position. SR up X 2. Call bell within reach and bed alarm is armed. Pt instructed that someone will be back within the hour to round on him and if he needed anything before then, call.  [facility] [place]  Nurses Note -- 4 Eyes      4/12/2023       Skin assessed on: Q Shift      [x] No Pressure Injuries Present    []Prevention Measures Documented    [] Yes LDA  for Pressure Injury Previously documented     [] Yes New Pressure Injury Discovered   [] LDA for New Pressure Injury Added      Attending RN:  Luc Leal, RN     Second RN:  Helen Carter

## 2023-04-13 NOTE — NURSING
Pt off the unit going to Surgery by stretcher via transport. IV access in place, saline locked. NAD or pain noted.

## 2023-04-14 VITALS
DIASTOLIC BLOOD PRESSURE: 74 MMHG | HEART RATE: 74 BPM | OXYGEN SATURATION: 100 % | RESPIRATION RATE: 18 BRPM | WEIGHT: 134.5 LBS | SYSTOLIC BLOOD PRESSURE: 123 MMHG | HEIGHT: 62 IN | TEMPERATURE: 98 F | BODY MASS INDEX: 24.75 KG/M2

## 2023-04-14 LAB
ANION GAP SERPL CALC-SCNC: 11 MMOL/L (ref 8–16)
BASOPHILS # BLD AUTO: 0 K/UL (ref 0–0.2)
BASOPHILS NFR BLD: 0 % (ref 0–1.9)
BUN SERPL-MCNC: 7 MG/DL (ref 6–20)
CALCIUM SERPL-MCNC: 8.9 MG/DL (ref 8.7–10.5)
CHLORIDE SERPL-SCNC: 101 MMOL/L (ref 95–110)
CO2 SERPL-SCNC: 24 MMOL/L (ref 23–29)
CREAT SERPL-MCNC: 0.6 MG/DL (ref 0.5–1.4)
DIFFERENTIAL METHOD: ABNORMAL
EOSINOPHIL # BLD AUTO: 0 K/UL (ref 0–0.5)
EOSINOPHIL NFR BLD: 0 % (ref 0–8)
ERYTHROCYTE [DISTWIDTH] IN BLOOD BY AUTOMATED COUNT: 12.4 % (ref 11.5–14.5)
EST. GFR  (NO RACE VARIABLE): >60 ML/MIN/1.73 M^2
GLUCOSE SERPL-MCNC: 117 MG/DL (ref 70–110)
HCT VFR BLD AUTO: 32.2 % (ref 37–48.5)
HGB BLD-MCNC: 10.5 G/DL (ref 12–16)
IMM GRANULOCYTES # BLD AUTO: 0.02 K/UL (ref 0–0.04)
IMM GRANULOCYTES NFR BLD AUTO: 0.2 % (ref 0–0.5)
LYMPHOCYTES # BLD AUTO: 0.7 K/UL (ref 1–4.8)
LYMPHOCYTES NFR BLD: 8.7 % (ref 18–48)
MAGNESIUM SERPL-MCNC: 1.7 MG/DL (ref 1.6–2.6)
MCH RBC QN AUTO: 30.2 PG (ref 27–31)
MCHC RBC AUTO-ENTMCNC: 32.6 G/DL (ref 32–36)
MCV RBC AUTO: 93 FL (ref 82–98)
MONOCYTES # BLD AUTO: 0.4 K/UL (ref 0.3–1)
MONOCYTES NFR BLD: 4.7 % (ref 4–15)
NEUTROPHILS # BLD AUTO: 7 K/UL (ref 1.8–7.7)
NEUTROPHILS NFR BLD: 86.4 % (ref 38–73)
NRBC BLD-RTO: 0 /100 WBC
PHOSPHATE SERPL-MCNC: 3.3 MG/DL (ref 2.7–4.5)
PLATELET # BLD AUTO: 187 K/UL (ref 150–450)
PMV BLD AUTO: 9.8 FL (ref 9.2–12.9)
POTASSIUM SERPL-SCNC: 4.5 MMOL/L (ref 3.5–5.1)
RBC # BLD AUTO: 3.48 M/UL (ref 4–5.4)
SODIUM SERPL-SCNC: 136 MMOL/L (ref 136–145)
WBC # BLD AUTO: 8.07 K/UL (ref 3.9–12.7)

## 2023-04-14 PROCEDURE — 25000003 PHARM REV CODE 250: Performed by: STUDENT IN AN ORGANIZED HEALTH CARE EDUCATION/TRAINING PROGRAM

## 2023-04-14 PROCEDURE — 36415 COLL VENOUS BLD VENIPUNCTURE: CPT | Performed by: STUDENT IN AN ORGANIZED HEALTH CARE EDUCATION/TRAINING PROGRAM

## 2023-04-14 PROCEDURE — 84100 ASSAY OF PHOSPHORUS: CPT | Performed by: STUDENT IN AN ORGANIZED HEALTH CARE EDUCATION/TRAINING PROGRAM

## 2023-04-14 PROCEDURE — 85025 COMPLETE CBC W/AUTO DIFF WBC: CPT | Performed by: STUDENT IN AN ORGANIZED HEALTH CARE EDUCATION/TRAINING PROGRAM

## 2023-04-14 PROCEDURE — 83735 ASSAY OF MAGNESIUM: CPT | Performed by: STUDENT IN AN ORGANIZED HEALTH CARE EDUCATION/TRAINING PROGRAM

## 2023-04-14 PROCEDURE — G0378 HOSPITAL OBSERVATION PER HR: HCPCS

## 2023-04-14 PROCEDURE — 80048 BASIC METABOLIC PNL TOTAL CA: CPT | Performed by: STUDENT IN AN ORGANIZED HEALTH CARE EDUCATION/TRAINING PROGRAM

## 2023-04-14 PROCEDURE — 96361 HYDRATE IV INFUSION ADD-ON: CPT

## 2023-04-14 PROCEDURE — 94760 N-INVAS EAR/PLS OXIMETRY 1: CPT

## 2023-04-14 RX ORDER — ACETAMINOPHEN 500 MG
1000 TABLET ORAL EVERY 8 HOURS
Refills: 0 | COMMUNITY
Start: 2023-04-14 | End: 2023-04-19

## 2023-04-14 RX ORDER — POLYETHYLENE GLYCOL 3350 17 G/17G
17 POWDER, FOR SOLUTION ORAL DAILY
Qty: 5 EACH | Refills: 0 | Status: SHIPPED | OUTPATIENT
Start: 2023-04-14 | End: 2023-04-19

## 2023-04-14 RX ORDER — OXYCODONE HYDROCHLORIDE 5 MG/1
5 TABLET ORAL EVERY 4 HOURS PRN
Qty: 20 TABLET | Refills: 0 | Status: SHIPPED | OUTPATIENT
Start: 2023-04-14 | End: 2023-06-28

## 2023-04-14 RX ORDER — IBUPROFEN 800 MG/1
800 TABLET ORAL EVERY 8 HOURS
Qty: 15 TABLET | Refills: 0 | COMMUNITY
Start: 2023-04-14 | End: 2023-04-19

## 2023-04-14 RX ADMIN — ACETAMINOPHEN 1000 MG: 500 TABLET, FILM COATED ORAL at 06:04

## 2023-04-14 RX ADMIN — IBUPROFEN 800 MG: 400 TABLET ORAL at 06:04

## 2023-04-14 RX ADMIN — ESCITALOPRAM OXALATE 20 MG: 10 TABLET ORAL at 08:04

## 2023-04-14 NOTE — NURSING
Transported downstairs for discharge via wheelchair with belongings & family at side, no distress noted, safety maintained.[facility] [place] 4 [place]      4/14/2023       Skin assessed on: Discharge      [x] No Pressure Injuries Present    []Prevention Measures Documented    [] Yes LDA  for Pressure Injury Previously documented     [] Yes New Pressure Injury Discovered   [] LDA for New Pressure Injury Added      Attending RN:  Mela Glasgow RN     Second RN:  ESTELLE Wick

## 2023-04-14 NOTE — NURSING
Discharge instructions reviewed with patient verbalized understanding, no concerns voiced at this time, awaiting  for transport home.

## 2023-04-14 NOTE — NURSING
[facility], [facility] [place]  Nurses Note -- 4 Eyes      4/14/2023       Skin assessed on: Q Shift      [x] No Pressure Injuries Present    []Prevention Measures Documented    [] Yes LDA  for Pressure Injury Previously documented     [] Yes New Pressure Injury Discovered   [] LDA for New Pressure Injury Added      Attending RN:  Mela Glasgow RN     Second RN:  Lizzy Macias RN

## 2023-04-14 NOTE — PLAN OF CARE
04/14/23 1019   Final Note   Assessment Type Final Discharge Note   Anticipated Discharge Disposition Home   Hospital Resources/Appts/Education Provided Appointments scheduled and added to AVS   Post-Acute Status   Post-Acute Authorization Other   Other Status No Post-Acute Service Needs     Pts nurse Louise notified that the pt can d/c from CM standpoint

## 2023-04-14 NOTE — PLAN OF CARE
Problem: Adult Inpatient Plan of Care  Goal: Plan of Care Review  Outcome: Ongoing, Progressing  Goal: Patient-Specific Goal (Individualized)  Outcome: Ongoing, Progressing  Goal: Optimal Comfort and Wellbeing  Outcome: Ongoing, Progressing     Problem: Pain Acute  Goal: Acceptable Pain Control and Functional Ability  Outcome: Ongoing, Progressing     Pt free from falls or any further trauma through out the shift. Prescribed medication administered. Complaints of pain, prn medication administered. In LR infusing at 50 ml/hr. Pt in no distress. Will continue to monitor.

## 2023-04-14 NOTE — DISCHARGE SUMMARY
[facility] Surg  General Surgery  Discharge Summary      Patient Name: Irena Case  MRN: [id number]  Admission Date: 4/12/2023  Hospital Length of Stay: 0 days  Discharge Date and Time:  04/14/2023 8:02 AM  Attending Physician: Osei Moore MD   Discharging Provider: Alysa Becerra MD  Primary Care Provider: Sue Hansen MD     HPI: Ms Case is a 49yo F w/PMH depression, HTN, fatty liver who presents to the ED with two days of RLQ abdominal pain. Patient states that on Monday she developed RLQ abdominal pain. The pain worsened yesterday and she had associated nausea and low grade fevers. She describes the pain as sharp without radiation. She went to see her PCP this morning who ordered labs and a CT scan. She denies dysuria, hematuria, hematochezia, changes to bowel habits. Last BM was today and she is passing gas.     No history of MI or stroke, not on anticoagulation.     Work up showed a dilated appendix with periappendiceal inflammation. WBC within normal limits.     PSH: open hysterectomy, lap tubal ligation  Social: drinks 1-5 glasses of wine per day but has never had withdrawals, current vaper, denies IVDU    Procedure(s) (LRB):  APPENDECTOMY, LAPAROSCOPIC (N/A)     Hospital Course: Irena Case underwent the above procedure on 4/13/23 as treatment for acute appendicitis. She tolerated the procedure well and her post-op course was uncomplicated. Prior to discharge home on 04/14/2023 her pain was well controlled on oral medications, tolerated diet, ambulated, spontaneously voided and experienced return of bowel function. She was discharged home in good condition on POD#1.      Consults:   Consults (From admission, onward)          Status Ordering Provider     Inpatient consult to General surgery  Once        Provider:  (Not yet assigned)    Completed HOLDSWORTH, ALAYNA            Significant Diagnostic Studies: Labs: BMP:   Recent Labs   Lab 04/12/23  1550 04/13/23  0359 04/14/23  0345     98 117*    136 136   K 4.4 3.9 4.5   CL 99 103 101   CO2 28 27 24   BUN 10 13 7   CREATININE 0.8 0.8 0.6   CALCIUM 10.0 8.4* 8.9   MG  --  1.8 1.7    and CBC   Recent Labs   Lab 04/12/23  1550 04/13/23  0359 04/14/23  0345   WBC 9.58 7.78 8.07   HGB 12.6 10.2* 10.5*   HCT 37.9 32.2* 32.2*    202 187     Physical exam:  Physical Exam  Constitutional:       Appearance: Normal appearance.   HENT:      Head: Normocephalic and atraumatic.   Cardiovascular:      Rate and Rhythm: Normal rate.   Pulmonary:      Effort: Pulmonary effort is normal. No respiratory distress.   Abdominal:      General: There is no distension.      Palpations: Abdomen is soft.      Comments: Appropriate incisional tenderness. Incisions clean, dry, intact with dermabond in place.   Skin:     General: Skin is warm and dry.      Capillary Refill: Capillary refill takes less than 2 seconds.   Neurological:      General: No focal deficit present.      Mental Status: She is alert.         Radiology: CT scan: CT ABDOMEN PELVIS WITH CONTRAST: No results found for this visit on 04/12/23.    Pending Diagnostic Studies:       Procedure Component Value Units Date/Time    Specimen to Pathology, Surgery General Surgery [[id number]] Collected: 04/13/23 1416    Order Status: Sent Lab Status: In process Updated: 04/13/23 1416    Specimen: Tissue           Final Active Diagnoses:    Diagnosis Date Noted POA    PRINCIPAL PROBLEM:  Acute appendicitis with localized peritonitis, without perforation, abscess, or gangrene [K35.30] 04/13/2023 Yes      Problems Resolved During this Admission:      Discharged Condition: good    Disposition: Home or Self Care    Follow Up:   Follow-up Information       Niall Benjamin MD Follow up in 2 week(s).    Specialties: General Surgery, Surgery  Why: Post-op  Contact information:  [address]  [address] [place] [id number] [phone]                           Patient Instructions:      Diet Adult Regular     Lifting  restrictions   Order Comments: Do not lift anything heavier than 10lbs for four weeks.     No driving until:   Order Comments: Do not drive while taking pain medications.     Notify your health care provider if you experience any of the following:  temperature >100.4     Notify your health care provider if you experience any of the following:  persistent nausea and vomiting or diarrhea     Notify your health care provider if you experience any of the following:  severe uncontrolled pain     Notify your health care provider if you experience any of the following:  redness, tenderness, or signs of infection (pain, swelling, redness, odor or green/yellow discharge around incision site)     No dressing needed   Order Comments: Incisions covered with dermabond (superglue), no dressing needed. Can keep covered with gauze as needed to protect clothing.     Activity as tolerated     Shower on day dressing removed (No bath)   Order Comments: Shower after 48 hours after surgery, do not submerge incisions in pool/ocean/bayou for 2 weeks.     Medications:  Reconciled Home Medications:      Medication List        START taking these medications      acetaminophen 500 MG tablet  Commonly known as: TYLENOL  Take 2 tablets (1,000 mg total) by mouth every 8 (eight) hours. Take scheduled for 5 days then take every 8 hours as needed for pain after that for 5 days     ibuprofen 800 MG tablet  Commonly known as: ADVIL,MOTRIN  Take 1 tablet (800 mg total) by mouth every 8 (eight) hours. Take scheduled with food for 5 days then take every 8 hours as needed for pain after that. for 5 days     oxyCODONE 5 MG immediate release tablet  Commonly known as: ROXICODONE  Take 1 tablet (5 mg total) by mouth every 4 (four) hours as needed.     polyethylene glycol 17 gram Pwpk  Commonly known as: GLYCOLAX  Take 17 g by mouth once daily. Take daily as long as you are taking narcotic pain medication, ok to stop once no longer requiring pain medication  for 5 days            CONTINUE taking these medications      EScitalopram oxalate 20 MG tablet  Commonly known as: LEXAPRO  TAKE ONE TABLET BY MOUTH ONCE DAILY FOR MOOD Strength: 20 mg     hydroCHLOROthiazide 25 MG tablet  Commonly known as: HYDRODIURIL  TAKE ONE TABLET BY MOUTH ONCE DAILY     lisinopriL 40 MG tablet  Commonly known as: PRINIVIL,ZESTRIL  TAKE ONE TABLET BY MOUTH ONCE DAILY              Alysa Becerra MD  General Surgery  [facility] Surg

## 2023-04-18 ENCOUNTER — OFFICE VISIT (OUTPATIENT)
Dept: FAMILY MEDICINE | Facility: CLINIC | Age: 48
End: 2023-04-18
Payer: OTHER GOVERNMENT

## 2023-04-18 VITALS
DIASTOLIC BLOOD PRESSURE: 98 MMHG | HEIGHT: 62 IN | WEIGHT: 136.88 LBS | RESPIRATION RATE: 16 BRPM | HEART RATE: 65 BPM | SYSTOLIC BLOOD PRESSURE: 156 MMHG | TEMPERATURE: 98 F | OXYGEN SATURATION: 99 % | BODY MASS INDEX: 25.19 KG/M2

## 2023-04-18 DIAGNOSIS — I10 PRIMARY HYPERTENSION: Primary | ICD-10-CM

## 2023-04-18 DIAGNOSIS — Z90.49 S/P APPENDECTOMY: ICD-10-CM

## 2023-04-18 PROBLEM — N95.1 MENOPAUSAL AND FEMALE CLIMACTERIC STATES: Status: ACTIVE | Noted: 2023-04-18

## 2023-04-18 PROBLEM — R01.1 CARDIAC MURMUR, UNSPECIFIED: Status: ACTIVE | Noted: 2023-04-18

## 2023-04-18 PROBLEM — J32.9 CHRONIC SINUSITIS: Status: ACTIVE | Noted: 2023-04-18

## 2023-04-18 PROBLEM — R74.01 ELEVATED TRANSAMINASE LEVEL: Status: RESOLVED | Noted: 2021-09-10 | Resolved: 2023-04-18

## 2023-04-18 PROBLEM — I34.1 PROLAPSING MITRAL LEAFLET SYNDROME: Status: ACTIVE | Noted: 2023-04-18

## 2023-04-18 PROBLEM — J30.9 ALLERGIC RHINITIS: Status: ACTIVE | Noted: 2023-04-18

## 2023-04-18 PROBLEM — R74.8 ELEVATED LIVER ENZYMES: Status: RESOLVED | Noted: 2022-04-12 | Resolved: 2023-04-18

## 2023-04-18 PROBLEM — H52.209 ASTIGMATISM: Status: ACTIVE | Noted: 2023-04-18

## 2023-04-18 PROBLEM — M41.9 SCOLIOSIS DEFORMITY OF SPINE: Status: ACTIVE | Noted: 2018-10-31

## 2023-04-18 PROBLEM — G43.909 MIGRAINE HEADACHE: Status: ACTIVE | Noted: 2023-04-18

## 2023-04-18 PROCEDURE — 99214 OFFICE O/P EST MOD 30 MIN: CPT | Mod: S$PBB,,, | Performed by: NURSE PRACTITIONER

## 2023-04-18 PROCEDURE — 99999 PR PBB SHADOW E&M-EST. PATIENT-LVL IV: CPT | Mod: PBBFAC,,, | Performed by: NURSE PRACTITIONER

## 2023-04-18 PROCEDURE — 99999 PR PBB SHADOW E&M-EST. PATIENT-LVL IV: ICD-10-PCS | Mod: PBBFAC,,, | Performed by: NURSE PRACTITIONER

## 2023-04-18 PROCEDURE — 99214 PR OFFICE/OUTPT VISIT, EST, LEVL IV, 30-39 MIN: ICD-10-PCS | Mod: S$PBB,,, | Performed by: NURSE PRACTITIONER

## 2023-04-18 PROCEDURE — 99214 OFFICE O/P EST MOD 30 MIN: CPT | Mod: PBBFAC,PO | Performed by: NURSE PRACTITIONER

## 2023-04-18 RX ORDER — CLONIDINE HYDROCHLORIDE 0.1 MG/1
0.1 TABLET ORAL
Status: DISCONTINUED | OUTPATIENT
Start: 2023-04-18 | End: 2024-01-26 | Stop reason: HOSPADM

## 2023-04-19 ENCOUNTER — PATIENT MESSAGE (OUTPATIENT)
Dept: SURGERY | Facility: CLINIC | Age: 48
End: 2023-04-19
Payer: OTHER GOVERNMENT

## 2023-04-19 ENCOUNTER — PATIENT MESSAGE (OUTPATIENT)
Dept: FAMILY MEDICINE | Facility: CLINIC | Age: 48
End: 2023-04-19
Payer: OTHER GOVERNMENT

## 2023-04-19 DIAGNOSIS — I10 PRIMARY HYPERTENSION: Primary | ICD-10-CM

## 2023-04-19 LAB
FINAL PATHOLOGIC DIAGNOSIS: NORMAL
Lab: NORMAL

## 2023-04-20 ENCOUNTER — PATIENT MESSAGE (OUTPATIENT)
Dept: FAMILY MEDICINE | Facility: CLINIC | Age: 48
End: 2023-04-20
Payer: OTHER GOVERNMENT

## 2023-04-20 VITALS — DIASTOLIC BLOOD PRESSURE: 87 MMHG | SYSTOLIC BLOOD PRESSURE: 131 MMHG

## 2023-04-20 RX ORDER — PROPRANOLOL HYDROCHLORIDE 60 MG/1
60 CAPSULE, EXTENDED RELEASE ORAL DAILY
Qty: 30 CAPSULE | Refills: 0 | Status: SHIPPED | OUTPATIENT
Start: 2023-04-20 | End: 2024-04-19

## 2023-04-21 ENCOUNTER — PATIENT MESSAGE (OUTPATIENT)
Dept: FAMILY MEDICINE | Facility: CLINIC | Age: 48
End: 2023-04-21
Payer: OTHER GOVERNMENT

## 2023-04-21 ENCOUNTER — OFFICE VISIT (OUTPATIENT)
Dept: SURGERY | Facility: CLINIC | Age: 48
End: 2023-04-21
Payer: OTHER GOVERNMENT

## 2023-04-21 VITALS — SYSTOLIC BLOOD PRESSURE: 117 MMHG | DIASTOLIC BLOOD PRESSURE: 79 MMHG

## 2023-04-21 VITALS
OXYGEN SATURATION: 100 % | SYSTOLIC BLOOD PRESSURE: 128 MMHG | WEIGHT: 135.38 LBS | BODY MASS INDEX: 24.91 KG/M2 | DIASTOLIC BLOOD PRESSURE: 86 MMHG | HEIGHT: 62 IN | HEART RATE: 71 BPM

## 2023-04-21 DIAGNOSIS — Z90.49 S/P APPENDECTOMY: Primary | ICD-10-CM

## 2023-04-21 PROCEDURE — 99024 POSTOP FOLLOW-UP VISIT: CPT | Mod: ,,, | Performed by: SURGERY

## 2023-04-21 PROCEDURE — 99213 OFFICE O/P EST LOW 20 MIN: CPT | Mod: PBBFAC | Performed by: SURGERY

## 2023-04-21 PROCEDURE — 99999 PR PBB SHADOW E&M-EST. PATIENT-LVL III: ICD-10-PCS | Mod: PBBFAC,,, | Performed by: SURGERY

## 2023-04-21 PROCEDURE — 99024 PR POST-OP FOLLOW-UP VISIT: ICD-10-PCS | Mod: ,,, | Performed by: SURGERY

## 2023-04-21 PROCEDURE — 99999 PR PBB SHADOW E&M-EST. PATIENT-LVL III: CPT | Mod: PBBFAC,,, | Performed by: SURGERY

## 2023-04-21 NOTE — PROGRESS NOTES
Surgery Clinic Note    Irena Case is a 48 y.o. year old female in clinic today for follow up of lap appy. Doing great.  No f/c/n/v/sob/cp    ROS:  Negative except above    Pathology:  Acute appendicitis    PE:  Vitals:    04/21/23 1310   BP: 128/86   Pulse: 71       NAD  No belabored breathing  Abd soft nt nd  Incisions c/d/I    A/P:  Irena Case is a 48 y.o. year old female s/p lap appy    -ok to return to work  -rtc prn    Niall Benjamin  General Surgery - [facility]  4/21/2023

## 2023-04-24 ENCOUNTER — PATIENT MESSAGE (OUTPATIENT)
Dept: RHEUMATOLOGY | Facility: CLINIC | Age: 48
End: 2023-04-24
Payer: OTHER GOVERNMENT

## 2023-04-25 VITALS — DIASTOLIC BLOOD PRESSURE: 79 MMHG | SYSTOLIC BLOOD PRESSURE: 117 MMHG

## 2023-04-26 NOTE — ANESTHESIA POSTPROCEDURE EVALUATION
Anesthesia Post Evaluation    Patient: Irena Case    Procedure(s) Performed: Procedure(s) (LRB):  APPENDECTOMY, LAPAROSCOPIC (N/A)    Final Anesthesia Type: general      Patient location during evaluation: PACU  Patient participation: Yes- Able to Participate  Level of consciousness: awake and alert and oriented  Post-procedure vital signs: reviewed and stable  Pain management: adequate  Airway patency: patent    PONV status at discharge: No PONV  Anesthetic complications: no      Cardiovascular status: blood pressure returned to baseline, hemodynamically stable and stable  Respiratory status: unassisted, spontaneous ventilation and room air  Hydration status: euvolemic  Follow-up not needed.          Vitals Value Taken Time   /79 04/25/23 1520   Temp 36.8 °C (98.3 °F) 04/18/23 1347   Pulse 71 04/21/23 1310   Resp 16 04/18/23 1347   SpO2 100 % 04/21/23 1310         Event Time   Out of Recovery 04/13/2023 15:44:36         Pain/Jung Score: No data recorded

## 2023-04-29 NOTE — PROGRESS NOTES
"Subjective:      Patient ID: Irena Case is a 48 y.o. female.  New to me but seen previously in clinic by a fellow provider. Pt presents to clinic with elevated BP s/p emergent appendectomy on 4/13/23 by .  BP previously controlled on lisinopril 40mg with HCTZ 25mg, did take early today, /100 on arrival. Denies any acute complaints today. Reports pain in improving, is eating and drinking normally without discomfort and having good output with daily BM and adequate urination. Is alternating tylenol and ibuprofen for mild abdominal incision pain.     Review of Systems   Constitutional:  Negative for activity change, appetite change, fatigue, fever and unexpected weight change.   Respiratory:  Negative for cough, chest tightness and shortness of breath.    Cardiovascular:  Negative for chest pain and palpitations.   Gastrointestinal:  Positive for abdominal pain. Negative for abdominal distention, anal bleeding, blood in stool, change in bowel habit, constipation, diarrhea, nausea, rectal pain, vomiting, reflux, fecal incontinence and change in bowel habit.   Genitourinary:  Negative for difficulty urinating, dysuria and menstrual problem.   Musculoskeletal:  Negative for arthralgias, back pain, gait problem and myalgias.   Integumentary:  Positive for wound. Negative for color change and rash.   Neurological:  Negative for dizziness, vertigo, tremors, seizures, syncope, facial asymmetry, speech difficulty, weakness, light-headedness, numbness, headaches, coordination difficulties, memory loss and coordination difficulties.   All other systems reviewed and are negative.      Objective:     Vitals:    04/18/23 1347 04/18/23 1428   BP: (!) 170/100 (!) 156/98   Pulse: 65    Resp: 16    Temp: 98.3 °F (36.8 °C)    TempSrc: Oral    SpO2: 99%    Weight: 62.1 kg (136 lb 14.5 oz)    Height: 5' 2" (1.575 m)      Physical Exam  Vitals and nursing note reviewed.   Constitutional:       General: She is not in acute " distress.     Appearance: Normal appearance. She is well-developed, well-groomed and normal weight. She is not ill-appearing.   HENT:      Head: Normocephalic and atraumatic.      Right Ear: External ear normal.      Left Ear: External ear normal.      Nose: Nose normal.      Mouth/Throat:      Lips: Pink.      Mouth: Mucous membranes are moist.   Eyes:      General: Lids are normal. Vision grossly intact. Gaze aligned appropriately.      Conjunctiva/sclera: Conjunctivae normal.      Pupils: Pupils are equal, round, and reactive to light.   Neck:      Trachea: Phonation normal.   Cardiovascular:      Rate and Rhythm: Normal rate and regular rhythm.      Heart sounds: Normal heart sounds.   Pulmonary:      Effort: Pulmonary effort is normal. No accessory muscle usage or respiratory distress.      Breath sounds: Normal breath sounds and air entry.   Abdominal:      General: Abdomen is flat. Bowel sounds are normal. There is no distension.      Palpations: Abdomen is soft.      Tenderness: There is no abdominal tenderness.   Musculoskeletal:      Cervical back: Neck supple.      Right lower leg: No edema.      Left lower leg: No edema.   Skin:     General: Skin is warm and dry.      Findings: Bruising (periumbilical) present. No rash.      Comments: Lap sites clean and dry, intact with dermabond    Neurological:      General: No focal deficit present.      Mental Status: She is alert and oriented to person, place, and time. Mental status is at baseline.      Cranial Nerves: No cranial nerve deficit.      Sensory: Sensation is intact.      Motor: Motor function is intact.      Coordination: Coordination is intact.      Gait: Gait is intact.   Psychiatric:         Attention and Perception: Attention and perception normal.         Mood and Affect: Mood and affect normal.         Speech: Speech normal.         Behavior: Behavior normal. Behavior is cooperative.         Thought Content: Thought content normal.          Cognition and Memory: Cognition and memory normal.         Judgment: Judgment normal.     Assessment and Plan:     1. Primary hypertension  BP decreased to 156/98 30min after clonidine administered in clinic, remains neurovascularly intact, Continue current treatment regimen.  Patient Education: Reviewed risks of hypertension and principles of treatment.  Monitor home BP and report readings in 24hrs  Recommendations: decrease sodium in the diet, elevate feet above the level of the heart whenever possible, increase physical activity, and use of compression stockings.  The patient was also instructed to call IMMEDIATELY (i.e., day or night) if any cardiopulmonary symptoms occur, especially chest pain, shortness of breath, dyspnea on exertion, paroxysmal nocturnal dyspnea, or orthopnea, and these were explained.  - cloNIDine tablet 0.1 mg    2. S/P appendectomy  Incisions healing well with no signs of obvious infection, GI functioning well, continue postop activity restrictions and f/u with  for further evaluation and clearance to return to work            JULIA Rodriguez, FNP-C  Family/Internal Medicine  [facility]

## 2023-05-25 ENCOUNTER — CLINICAL SUPPORT (OUTPATIENT)
Dept: ENDOSCOPY | Facility: HOSPITAL | Age: 48
End: 2023-05-25
Attending: INTERNAL MEDICINE
Payer: OTHER GOVERNMENT

## 2023-05-25 VITALS — BODY MASS INDEX: 25.11 KG/M2 | WEIGHT: 133 LBS | HEIGHT: 61 IN

## 2023-05-25 DIAGNOSIS — Z12.11 ENCOUNTER FOR SCREENING COLONOSCOPY: ICD-10-CM

## 2023-05-25 RX ORDER — POLYETHYLENE GLYCOL 3350, SODIUM SULFATE ANHYDROUS, SODIUM BICARBONATE, SODIUM CHLORIDE, POTASSIUM CHLORIDE 236; 22.74; 6.74; 5.86; 2.97 G/4L; G/4L; G/4L; G/4L; G/4L
4 POWDER, FOR SOLUTION ORAL ONCE
Qty: 4000 ML | Refills: 0 | Status: SHIPPED | OUTPATIENT
Start: 2023-05-25 | End: 2023-05-25

## 2023-05-25 NOTE — PLAN OF CARE
Spoke to patient. Colonoscopy scheduled. 5/30/23 with an arrival time of 1:00 PM confirmed.  Instructions reviewed and verbalized. Instructions sent via portal.  Patient verbalized an understanding.

## 2023-05-29 ENCOUNTER — TELEPHONE (OUTPATIENT)
Dept: ENDOSCOPY | Facility: HOSPITAL | Age: 48
End: 2023-05-29
Payer: OTHER GOVERNMENT

## 2023-06-21 ENCOUNTER — PATIENT MESSAGE (OUTPATIENT)
Dept: SURGERY | Facility: HOSPITAL | Age: 48
End: 2023-06-21
Payer: OTHER GOVERNMENT

## 2023-06-28 ENCOUNTER — OFFICE VISIT (OUTPATIENT)
Dept: FAMILY MEDICINE | Facility: CLINIC | Age: 48
End: 2023-06-28
Payer: OTHER GOVERNMENT

## 2023-06-28 ENCOUNTER — HOSPITAL ENCOUNTER (OUTPATIENT)
Dept: RADIOLOGY | Facility: HOSPITAL | Age: 48
Discharge: HOME | End: 2023-06-28
Attending: NURSE PRACTITIONER
Payer: OTHER GOVERNMENT

## 2023-06-28 VITALS
OXYGEN SATURATION: 98 % | WEIGHT: 140.19 LBS | TEMPERATURE: 98 F | DIASTOLIC BLOOD PRESSURE: 70 MMHG | RESPIRATION RATE: 16 BRPM | BODY MASS INDEX: 26.47 KG/M2 | HEART RATE: 87 BPM | SYSTOLIC BLOOD PRESSURE: 116 MMHG | HEIGHT: 61 IN

## 2023-06-28 DIAGNOSIS — M41.9 SCOLIOSIS, UNSPECIFIED SCOLIOSIS TYPE, UNSPECIFIED SPINAL REGION: ICD-10-CM

## 2023-06-28 DIAGNOSIS — M25.552 LEFT HIP PAIN: Primary | ICD-10-CM

## 2023-06-28 DIAGNOSIS — M25.552 LEFT HIP PAIN: ICD-10-CM

## 2023-06-28 PROCEDURE — 99999 PR PBB SHADOW E&M-EST. PATIENT-LVL IV: CPT | Mod: PBBFAC,,, | Performed by: NURSE PRACTITIONER

## 2023-06-28 PROCEDURE — 73521 X-RAY EXAM HIPS BI 2 VIEWS: CPT | Mod: 26,,, | Performed by: STUDENT IN AN ORGANIZED HEALTH CARE EDUCATION/TRAINING PROGRAM

## 2023-06-28 PROCEDURE — 99214 OFFICE O/P EST MOD 30 MIN: CPT | Mod: S$PBB,,, | Performed by: NURSE PRACTITIONER

## 2023-06-28 PROCEDURE — 73521 XR HIPS BILATERAL 2 VIEW INCL AP PELVIS: ICD-10-PCS | Mod: 26,,, | Performed by: STUDENT IN AN ORGANIZED HEALTH CARE EDUCATION/TRAINING PROGRAM

## 2023-06-28 PROCEDURE — 99214 OFFICE O/P EST MOD 30 MIN: CPT | Mod: PBBFAC,PO | Performed by: NURSE PRACTITIONER

## 2023-06-28 PROCEDURE — 99214 PR OFFICE/OUTPT VISIT, EST, LEVL IV, 30-39 MIN: ICD-10-PCS | Mod: S$PBB,,, | Performed by: NURSE PRACTITIONER

## 2023-06-28 PROCEDURE — 99999 PR PBB SHADOW E&M-EST. PATIENT-LVL IV: ICD-10-PCS | Mod: PBBFAC,,, | Performed by: NURSE PRACTITIONER

## 2023-06-28 PROCEDURE — 73521 X-RAY EXAM HIPS BI 2 VIEWS: CPT | Mod: TC,FY

## 2023-06-28 RX ORDER — POLYETHYLENE GLYCOL-3350 AND ELECTROLYTES 236; 6.74; 5.86; 2.97; 22.74 G/274.31G; G/274.31G; G/274.31G; G/274.31G; G/274.31G
POWDER, FOR SOLUTION ORAL
COMMUNITY
Start: 2023-05-25 | End: 2023-09-20

## 2023-06-28 RX ORDER — CELECOXIB 200 MG/1
200 CAPSULE ORAL EVERY 12 HOURS PRN
Qty: 60 CAPSULE | Refills: 0 | Status: SHIPPED | OUTPATIENT
Start: 2023-06-28

## 2023-06-28 RX ORDER — METHOCARBAMOL 750 MG/1
750 TABLET, FILM COATED ORAL
Qty: 60 TABLET | Refills: 0 | Status: SHIPPED | OUTPATIENT
Start: 2023-06-28

## 2023-06-29 ENCOUNTER — PATIENT MESSAGE (OUTPATIENT)
Dept: FAMILY MEDICINE | Facility: CLINIC | Age: 48
End: 2023-06-29
Payer: OTHER GOVERNMENT

## 2023-06-29 NOTE — PROGRESS NOTES
Subjective:      Patient ID: Irena Case is a 48 y.o. female.  Pt with hx of scoliosis present to clinic with recurrent hip pain without new trauma, injury or fall. Has taking ibuprofen 800mg, tylenol 1000mg and oxycodone 5mg with no relief. Pain worse after standing and walking, improved with rest, unaffected by walking up and down stairs. Denies paresis, paresthesia, radiculopathy, saddle anesthesia, bowel or bladder complaints.     Hip Pain   The incident occurred more than 1 week ago. There was no injury mechanism. The pain is present in the left hip. The quality of the pain is described as aching and cramping. The pain is severe. The pain has been Fluctuating since onset. Pertinent negatives include no inability to bear weight, loss of motion, loss of sensation, muscle weakness, numbness or tingling. The symptoms are aggravated by movement. She has tried acetaminophen, NSAIDs and rest for the symptoms. Improvement on treatment: variable.   Review of Systems   Constitutional:  Negative for activity change, appetite change, fatigue, fever and unexpected weight change.   HENT: Negative.     Eyes: Negative.    Respiratory:  Negative for chest tightness and shortness of breath.    Cardiovascular:  Negative for chest pain, palpitations, leg swelling and claudication.   Gastrointestinal:  Negative for abdominal pain, change in bowel habit, constipation, diarrhea, nausea, vomiting and change in bowel habit.   Genitourinary:  Negative for bladder incontinence, difficulty urinating, dysuria and menstrual problem.   Musculoskeletal:  Positive for arthralgias, back pain, gait problem and joint deformity. Negative for joint swelling, leg pain, myalgias, neck pain and neck stiffness.   Integumentary:  Negative for color change, rash and wound.   Neurological:  Negative for tingling, tremors, weakness, numbness and headaches.   Psychiatric/Behavioral: Negative.     All other systems reviewed and are negative.     "  Objective:     Vitals:    06/28/23 1428   BP: 116/70   Pulse: 87   Resp: 16   Temp: 98.2 °F (36.8 °C)   TempSrc: Oral   SpO2: 98%   Weight: 63.6 kg (140 lb 3.4 oz)   Height: 5' 1" (1.549 m)     Physical Exam  Vitals and nursing note reviewed.   Constitutional:       General: She is not in acute distress.     Appearance: Normal appearance. She is well-developed and well-groomed. She is not ill-appearing.   HENT:      Head: Normocephalic and atraumatic.      Right Ear: External ear normal.      Left Ear: External ear normal.      Nose: Nose normal.      Mouth/Throat:      Lips: Pink.      Mouth: Mucous membranes are moist.   Eyes:      General: Lids are normal. Vision grossly intact. Gaze aligned appropriately.      Conjunctiva/sclera: Conjunctivae normal.      Pupils: Pupils are equal, round, and reactive to light.   Neck:      Trachea: Phonation normal.   Cardiovascular:      Rate and Rhythm: Normal rate and regular rhythm.      Heart sounds: Normal heart sounds.   Pulmonary:      Effort: Pulmonary effort is normal. No accessory muscle usage or respiratory distress.      Breath sounds: Normal breath sounds and air entry.   Abdominal:      General: Abdomen is flat. Bowel sounds are normal. There is no distension.      Palpations: Abdomen is soft.      Tenderness: There is no abdominal tenderness.   Musculoskeletal:      Cervical back: Neck supple.      Thoracic back: Scoliosis present.      Lumbar back: Spasms and tenderness present. No swelling, edema, deformity, signs of trauma or bony tenderness. Normal range of motion. Negative right straight leg raise test and negative left straight leg raise test. Scoliosis present.      Right hip: Normal.      Left hip: Tenderness and crepitus present. No deformity or bony tenderness. Normal range of motion. Normal strength.      Right lower leg: Normal. No edema.      Left lower leg: Normal. No edema.      Comments: Leg length discrepancy    Skin:     General: Skin is warm " and dry.      Findings: No rash.   Neurological:      General: No focal deficit present.      Mental Status: She is alert and oriented to person, place, and time. Mental status is at baseline.      Sensory: Sensation is intact.      Motor: Motor function is intact.      Coordination: Coordination is intact.      Gait: Gait is intact.   Psychiatric:         Attention and Perception: Attention and perception normal.         Mood and Affect: Mood and affect normal.         Speech: Speech normal.         Behavior: Behavior normal. Behavior is cooperative.         Thought Content: Thought content normal.         Cognition and Memory: Cognition and memory normal.         Judgment: Judgment normal.     EXAMINATION: XR HIPS BILATERAL 2 VIEW INCL AP PELVIS  No acute fracture, dislocation, or osseous destruction.  Mild joint space loss of the bilateral hips.  Soft tissues are unremarkable.  Assessment and Plan:     1. Left hip pain  Atraumatic, suspect scoliosis exacerbation  Natural history and expected course discussed. Questions answered.  Proper lifting, bending technique discussed.  Stretching exercises discussed.  Regular aerobic and trunk strengthening exercises discussed.  Ice to affected area as needed for local pain relief.  Heat to affected area as needed for local pain relief.  NSAIDs per medication orders.  OTC analgesics as needed.  Muscle relaxants per medication orders.  PT referral.  - celecoxib (CELEBREX) 200 MG capsule; Take 1 capsule (200 mg total) by mouth every 12 (twelve) hours as needed for Pain.  Dispense: 60 capsule; Refill: 0  - methocarbamoL (ROBAXIN) 750 MG Tab; Take 1 tablet (750 mg total) by mouth every 4 to 6 hours as needed (muscle spasm).  Dispense: 60 tablet; Refill: 0  - Ambulatory referral/consult to Physical/Occupational Therapy; Future    2. Scoliosis, unspecified scoliosis type, unspecified spinal region  Same as above  - celecoxib (CELEBREX) 200 MG capsule; Take 1 capsule (200 mg  total) by mouth every 12 (twelve) hours as needed for Pain.  Dispense: 60 capsule; Refill: 0  - methocarbamoL (ROBAXIN) 750 MG Tab; Take 1 tablet (750 mg total) by mouth every 4 to 6 hours as needed (muscle spasm).  Dispense: 60 tablet; Refill: 0  - Ambulatory referral/consult to Physical/Occupational Therapy; Future           JULIA Rodriguez, FNP-C  Family/Internal Medicine  [facility]

## 2023-06-30 ENCOUNTER — PATIENT MESSAGE (OUTPATIENT)
Dept: FAMILY MEDICINE | Facility: CLINIC | Age: 48
End: 2023-06-30

## 2023-06-30 ENCOUNTER — PATIENT MESSAGE (OUTPATIENT)
Dept: FAMILY MEDICINE | Facility: CLINIC | Age: 48
End: 2023-06-30
Payer: OTHER GOVERNMENT

## 2023-06-30 ENCOUNTER — OFFICE VISIT (OUTPATIENT)
Dept: FAMILY MEDICINE | Facility: CLINIC | Age: 48
End: 2023-06-30
Payer: OTHER GOVERNMENT

## 2023-06-30 DIAGNOSIS — M41.9 SCOLIOSIS, UNSPECIFIED SCOLIOSIS TYPE, UNSPECIFIED SPINAL REGION: ICD-10-CM

## 2023-06-30 DIAGNOSIS — M25.552 LEFT HIP PAIN: Primary | ICD-10-CM

## 2023-06-30 PROCEDURE — 99999 PR PBB SHADOW E&M-EST. PATIENT-LVL II: CPT | Mod: PBBFAC,,, | Performed by: NURSE PRACTITIONER

## 2023-06-30 PROCEDURE — 99999 PR PBB SHADOW E&M-EST. PATIENT-LVL II: ICD-10-PCS | Mod: PBBFAC,,, | Performed by: NURSE PRACTITIONER

## 2023-06-30 PROCEDURE — 99212 OFFICE O/P EST SF 10 MIN: CPT | Mod: PBBFAC,PO | Performed by: NURSE PRACTITIONER

## 2023-06-30 PROCEDURE — 96372 THER/PROPH/DIAG INJ SC/IM: CPT | Mod: PBBFAC,PO

## 2023-06-30 PROCEDURE — 99211 OFF/OP EST MAY X REQ PHY/QHP: CPT | Mod: S$PBB,,, | Performed by: NURSE PRACTITIONER

## 2023-06-30 PROCEDURE — 99211 PR OFFICE/OUTPT VISIT, EST, LEVL I: ICD-10-PCS | Mod: S$PBB,,, | Performed by: NURSE PRACTITIONER

## 2023-06-30 RX ORDER — KETOROLAC TROMETHAMINE 30 MG/ML
60 INJECTION, SOLUTION INTRAMUSCULAR; INTRAVENOUS
Status: COMPLETED | OUTPATIENT
Start: 2023-06-30 | End: 2023-06-30

## 2023-06-30 RX ADMIN — KETOROLAC TROMETHAMINE 60 MG: 60 INJECTION, SOLUTION INTRAMUSCULAR at 01:06

## 2023-06-30 NOTE — PROGRESS NOTES
Administered Toradol 30mg/mL IM to left upper outer glut. No s/s of any adverse reaction noted.

## 2023-06-30 NOTE — PROGRESS NOTES
Pt in clinic for IM Toradol injection given by clinic nurse. No physical exam performed today, last back imaging and clinic visit with me 6/28/2023. Will RTC next week for IM toradol 60mg and depomedrol 80mg prior to long distance travel.

## 2023-07-06 ENCOUNTER — CLINICAL SUPPORT (OUTPATIENT)
Dept: REHABILITATION | Facility: OTHER | Age: 48
End: 2023-07-06
Payer: OTHER GOVERNMENT

## 2023-07-06 DIAGNOSIS — M25.552 LEFT HIP PAIN: ICD-10-CM

## 2023-07-06 DIAGNOSIS — G89.29 CHRONIC LEFT HIP PAIN: ICD-10-CM

## 2023-07-06 DIAGNOSIS — Z74.09 DECREASED STRENGTH, ENDURANCE, AND MOBILITY: ICD-10-CM

## 2023-07-06 DIAGNOSIS — M41.9 SCOLIOSIS, UNSPECIFIED SCOLIOSIS TYPE, UNSPECIFIED SPINAL REGION: ICD-10-CM

## 2023-07-06 DIAGNOSIS — R68.89 POSTURAL INSTABILITY OF TRUNK: ICD-10-CM

## 2023-07-06 DIAGNOSIS — M25.552 CHRONIC LEFT HIP PAIN: ICD-10-CM

## 2023-07-06 DIAGNOSIS — R68.89 DECREASED STRENGTH, ENDURANCE, AND MOBILITY: ICD-10-CM

## 2023-07-06 DIAGNOSIS — R53.1 DECREASED STRENGTH, ENDURANCE, AND MOBILITY: ICD-10-CM

## 2023-07-06 PROCEDURE — 97110 THERAPEUTIC EXERCISES: CPT | Mod: PN

## 2023-07-06 PROCEDURE — 97162 PT EVAL MOD COMPLEX 30 MIN: CPT | Mod: PN

## 2023-07-06 PROCEDURE — 97530 THERAPEUTIC ACTIVITIES: CPT | Mod: PN

## 2023-07-07 ENCOUNTER — HOSPITAL ENCOUNTER (OUTPATIENT)
Dept: RADIOLOGY | Facility: HOSPITAL | Age: 48
Discharge: HOME | End: 2023-07-07
Attending: INTERNAL MEDICINE
Payer: OTHER GOVERNMENT

## 2023-07-07 DIAGNOSIS — Z12.31 OTHER SCREENING MAMMOGRAM: ICD-10-CM

## 2023-07-07 PROCEDURE — 77067 SCR MAMMO BI INCL CAD: CPT | Mod: 26,,, | Performed by: RADIOLOGY

## 2023-07-07 PROCEDURE — 77067 MAMMO DIGITAL SCREENING BILAT WITH TOMO: ICD-10-PCS | Mod: 26,,, | Performed by: RADIOLOGY

## 2023-07-07 PROCEDURE — 77063 MAMMO DIGITAL SCREENING BILAT WITH TOMO: ICD-10-PCS | Mod: 26,,, | Performed by: RADIOLOGY

## 2023-07-07 PROCEDURE — 77063 BREAST TOMOSYNTHESIS BI: CPT | Mod: 26,,, | Performed by: RADIOLOGY

## 2023-07-07 PROCEDURE — 77067 SCR MAMMO BI INCL CAD: CPT | Mod: TC

## 2023-07-12 ENCOUNTER — CLINICAL SUPPORT (OUTPATIENT)
Dept: FAMILY MEDICINE | Facility: CLINIC | Age: 48
End: 2023-07-12
Payer: OTHER GOVERNMENT

## 2023-07-12 ENCOUNTER — CLINICAL SUPPORT (OUTPATIENT)
Dept: REHABILITATION | Facility: HOSPITAL | Age: 48
End: 2023-07-12
Payer: OTHER GOVERNMENT

## 2023-07-12 DIAGNOSIS — R68.89 POSTURAL INSTABILITY OF TRUNK: ICD-10-CM

## 2023-07-12 DIAGNOSIS — R68.89 DECREASED STRENGTH, ENDURANCE, AND MOBILITY: ICD-10-CM

## 2023-07-12 DIAGNOSIS — M41.9 SCOLIOSIS, UNSPECIFIED SCOLIOSIS TYPE, UNSPECIFIED SPINAL REGION: Primary | ICD-10-CM

## 2023-07-12 DIAGNOSIS — M25.552 CHRONIC LEFT HIP PAIN: Primary | ICD-10-CM

## 2023-07-12 DIAGNOSIS — G89.29 CHRONIC LEFT HIP PAIN: Primary | ICD-10-CM

## 2023-07-12 DIAGNOSIS — Z74.09 DECREASED STRENGTH, ENDURANCE, AND MOBILITY: ICD-10-CM

## 2023-07-12 DIAGNOSIS — R53.1 DECREASED STRENGTH, ENDURANCE, AND MOBILITY: ICD-10-CM

## 2023-07-12 PROCEDURE — 97110 THERAPEUTIC EXERCISES: CPT | Mod: PN

## 2023-07-12 PROCEDURE — 97140 MANUAL THERAPY 1/> REGIONS: CPT | Mod: PN

## 2023-07-12 PROCEDURE — 96372 THER/PROPH/DIAG INJ SC/IM: CPT | Mod: PBBFAC,PO

## 2023-07-12 RX ORDER — METHYLPREDNISOLONE ACETATE 80 MG/ML
80 INJECTION, SUSPENSION INTRA-ARTICULAR; INTRALESIONAL; INTRAMUSCULAR; SOFT TISSUE
Status: COMPLETED | OUTPATIENT
Start: 2023-07-12 | End: 2023-07-12

## 2023-07-12 RX ORDER — KETOROLAC TROMETHAMINE 30 MG/ML
60 INJECTION, SOLUTION INTRAMUSCULAR; INTRAVENOUS
Status: COMPLETED | OUTPATIENT
Start: 2023-07-12 | End: 2023-07-12

## 2023-07-12 RX ADMIN — METHYLPREDNISOLONE ACETATE 80 MG: 80 INJECTION, SUSPENSION INTRALESIONAL; INTRAMUSCULAR; INTRASYNOVIAL; SOFT TISSUE at 09:07

## 2023-07-12 RX ADMIN — KETOROLAC TROMETHAMINE 60 MG: 60 INJECTION, SOLUTION INTRAMUSCULAR at 09:07

## 2023-07-12 NOTE — PLAN OF CARE
"[facility]   Physical Therapy Initial Evaluation      [id number]    Therapy Diagnosis:   Encounter Diagnoses   Name Primary?    Left hip pain     Scoliosis, unspecified scoliosis type, unspecified spinal region     Chronic left hip pain     Decreased strength, endurance, and mobility     Postural instability of trunk         Physician: Mora Venegas, NP    Physician Orders: PT Eval and Treat   Medical Diagnosis from Referral: M25.552 (ICD-10-CM) - Left hip pain M41.9 (ICD-10-CM) - Scoliosis, unspecified scoliosis type, unspecified spinal region   Evaluation Date: 7/6/2023  Authorization Period Expiration: 10/28/2023   Plan of Care Expiration: 10/6/2023  Progress Note Due: 8/6/2023  Visit # / Visits authorized: 1/ 1   FOTO: 1/3    Precautions: Standard and scoliosis     Time In: 130pm  Time Out: 2:23pm  Total Billable Time: 53 minutes    Subjective     Date of onset: 1 month ago    History of current condition - Irena reports: 1 month ago, acute flare up of Left hip pain without ZEESHAN or trauma. Pain worse with standing, walking, sitting on soft surfaces for long periods of time, at the end of the day. Denies clicking, locking, popping. Intermittent hx of hip and back pain due to PMHx of scoliosis. Going on a cruise on the 7/15, starts back at school in a month. Wants to transfer to the [facility] if possible.    Falls: none recently    Imaging: bone scan films, hip, 2023: No acute fracture, dislocation, or osseous destruction. Mild joint space loss of the bilateral hips. Soft tissues are unremarkable.     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  Prior Level of Function: independent  Current Level of Function: pain and difficulty with all functional activities    Pain:  Current 3/10, worst 10/10, best 3/10   Location: left hip - "deep " "inside" indicates more lateral than medial  Description: Aching and Dull  Aggravating Factors: Sitting, Standing, Walking, Night Time, Morning, Lifting, Getting out of bed/chair, and squatting, stair climbing  Easing Factors: change position, rest, antiinflammatory medication, toridol shot recently    Patients goals: resolve sx before school starts     Medical History:   Past Medical History:   Diagnosis Date    Endometriosis     Fatty liver     Hypertension        Surgical History:   Irena Case  has a past surgical history that includes Total abdominal hysterectomy w/ bilateral salpingoophorectomy; LASIK (Bilateral); Hysterectomy; Breast biopsy (Left); Laparoscopic appendectomy (N/A, 04/13/2023); and Appendectomy.    Medications:   Irena has a current medication list which includes the following prescription(s): celecoxib, escitalopram oxalate, gavilyte-g, hydrochlorothiazide, lisinopril, methocarbamol, and propranolol, and the following Facility-Administered Medications: clonidine.    Allergies:   Review of patient's allergies indicates:  No Known Allergies     Objective      Postural examination/scapula alignment: scoliosis in lower LSP with L convexity, increased L femoral IR in standing (increases in SLS), slight PPT, anterior innominate rotation L with possible LLD   Sensory deficit: none  Reflexes: intact all    Thoracic/Lumbar AROM: Pain/Dysfunction with Movement:   Flexion WFL, fingertips 4" above toes, pinch pain in ant-lat left hip   Extension Mod duran, no pain   Right side bending WNL, fingertips to knee joint line, tension on L hip   Left side bending Mod duran, fingertips 2" above knee joint line, pinch pain on L ant-lat hip, limited 2* to scoliosis   Right rotation Min duran, no pain   Left rotation Min duran, tension over L hip     Hip ROM: ext = WNL, flex = WNL, IR = WNL/painful, ER = min duran/painful  Knee ROM: ext = WNL, flex = WNL    Lower Extremity Strength  Right LE  Left LE    Hip flexion: 5/5 " "Hip flexion: 4/5, pain   Hip extension:  5/5 Hip extension: 4+/5   Hip abduction: 4/5 Hip abduction: 4-/5, pain   Hip adduction:  5/5 Hip adduction:  5/5   Hip Internal rotation   4+/5 Hip Internal rotation 4-/5   Knee Flexion 5/5 Knee Flexion 4+/5   Knee Extension 5/5 Knee Extension 4+/5   Ankle dorsiflexion: 5/5 Ankle dorsiflexion: 5/5   Ankle plantarflexion: 5/5 Ankle plantarflexion: 5/5     Flexibility:   Derek test: Hip flexors: min duran (L)  Ely's: Quads: min duran (L)  Rosio test: ITB: NT  Hamstring (SLR): 80 deg alpa    Joint Mobility: mild hypo throughout LSP 2* scoliosis    Special Tests:   Test Name  Test Result   Prone Instability Test (--)   Lumbar Quadrant test (--)   Straight Leg Raise (--)   Neural Tension Test (Slump) (--)   Crossed Straight Leg Raise (--)   Walking on toes (--)   Walking on heels  (--)   Clonus (--)   SHARIF (--)   FADIR (--)   SI Joint Provocation Test (compression / distraction) (--)     Functional Movement Analysis:   Gait: I, antalgic  Sit<>stand t/f: I  Bed mobility: I  DL Squat: dysfunctional, painful, increased knee valgus collapse  SL squat: dysfunctional, poor hip/knee flex without significant knee valgus collapse    Balance:SLS = 10" with mod sway, LOB      Intake Outcome Measure for TO hip Survey    Therapist reviewed FOTO scores for Irena Case on 7/6/2023.   FOTO documents entered into [employer] - see Media section.             Treatment     Total Treatment time (time-based codes) separate from Evaluation: 33 minutes     Irena received the treatments listed below:      therapeutic exercises to develop strength, endurance, ROM, flexibility, posture, and core stabilization for 15 minutes including:  bridge with band x 30 x RTB at knees  clam x 30 x 5" holds x RTB at knees  reverse clam x 30 x 5" hold x YTB at ankles  figure 4 IR/ER hip stretch 3 x 30" ea    manual therapy techniques: Joint mobilizations, Myofacial release, and Soft tissue Mobilization were applied to the: " hip/back for 00 minutes, including:  None today.  Consider dry needling in the future.    neuromuscular re-education activities to improve: Balance, Coordination, Kinesthetic, Sense, Proprioception, and Posture for 8 minutes. The following activities were included:  modified side plank iwth hip abd x 20  mod side plank standing with hip abd -  demo'd for HEP    therapeutic activities to improve functional performance for 10  minutes, including:  Pt edu x 10' - see below      Patient Education and Home Exercises     Education provided:   - Patient educated regarding surgical procedure, pathogenesis, diagnosis, protocol, prognosis, POC, and HEP, including use of visual assistance for understanding of anatomy and dysfunction. Written Home Exercises Provided with written and verbal instructions for frequency and duration of the following exercises: see list above. Pt educated on HEP and activity modifications to reduce c/o pain and improve overall function.   - Pt was educated in posture and body mechanics.  Use of a lumbar roll was recommended and demonstrated here today.  Purchase information provided.   - Pt also educated on use of modalities prn to reduce c/o pain and dysfunction.     Written Home Exercises Provided: yes. Exercises were reviewed and Irena was able to demonstrate them prior to the end of the session.  Irena demonstrated good  understanding of the education provided. See EMR under Patient Instructions for exercises provided during therapy sessions.    Assessment     Irena is a 48 y.o. female referred to outpatient Physical Therapy with a medical diagnosis of M25.552 (ICD-10-CM) - Left hip pain M41.9 (ICD-10-CM) - Scoliosis, unspecified scoliosis type, unspecified spinal region . Patient presents with marked limitations in ROM, joint and myofascial mobility, flexibility, strength, postural awareness/endurance, motor control and coordination. S/s associated with referring diagnosis. Impairments limit  pt with all functional activities including work and HHCs.      Patient prognosis is Good.   Patient will benefit from skilled outpatient Physical Therapy to address the deficits stated above and in the chart below, provide patient /family education, and to maximize patientt's level of independence.     Plan of care discussed with patient: Yes  Patient's spiritual, cultural and educational needs considered and patient is agreeable to the plan of care and goals as stated below:     Anticipated Barriers for therapy: standard, occupation    Medical Necessity is demonstrated by the following  History  Co-morbidities and personal factors that may impact the plan of care [] LOW: no personal factors / co-morbidities  [x] MODERATE: 1-2 personal factors / co-morbidities  [] HIGH: 3+ personal factors / co-morbidities    Moderate / High Support Documentation:   Co-morbidities affecting plan of care: Hx scoliosis    Personal Factors:   no deficits     Examination  Body Structures and Functions, activity limitations and participation restrictions that may impact the plan of care [] LOW: addressing 1-2 elements  [x] MODERATE: 3+ elements  [] HIGH: 4+ elements (please support below)    Moderate / High Support Documentation: decreased strength, motor control/coordination, difficulty with prolonged standing with HHCs/ work activiteis     Clinical Presentation [] LOW: stable  [x] MODERATE: Evolving  [] HIGH: Unstable     Decision Making/ Complexity Score: moderate       Goals:  Short Term Goals (4 Weeks):  1. Pt able to stand >=30 minutes with ADLs with <3/10 pain.  2. Pt able to walk >=30 minutes with household chores with <3/10 pain.  3. Pt able to ascend/descend curb, independently, 1 handrail, with <3/10 pain.  4. Pt to demonstrate improved functional ability with HOOS Jr limitation >=75%.    Long Term Goals (12 Weeks):  1. Pt able to stand >=1 hour with with ADLs with <3/10 pain.  2. Pt able to walk >=1 hour with household  chores with <3/10 pain.  3.  Pt able to squat/lift >20# from floor to waist with <3/10 pain.  4. Pt able to ascend/descend 1 flight of stairs, independently, 1 handrail, with <3/10 pain.  5. Pt will be independent with HEP and self management of symptoms.   6. Pt to demonstrate improved functional ability with HOOS Jr limitation >=85%    Plan     Plan of care Certification: 7/6/2023 to 10/6/2023.    Outpatient Physical Therapy 2 times weekly for 12 weeks to include the following interventions: Aquatic Therapy, Cervical/Lumbar Traction, Electrical Stimulation prn with dry needling, Iontophoresis (with dexamethasone prn), Manual Therapy, Moist Heat/ Ice, Neuromuscular Re-ed, Patient Education, Self Care, Therapeutic Activities, and Therapeutic Exercise. Progress HEP towards D/C. Recommend F/U with MD if symptoms worsen or do not resolve. Patient may be seen by a PTA for treatment to carry out their plan of care.  Face-to-face conferences will be held.      Sobeida Bledsoe, PT

## 2023-07-12 NOTE — PROGRESS NOTES
Administered Toradol 60mg/2mL IM to right upper outer glut. No s/s of any adverse reaction noted.    Administered Depo-Medrol 80mg/mL IM to left upper outer glut. No s/s of any adverse reaction noted.

## 2023-07-24 NOTE — PROGRESS NOTES
"  [facility]   Physical Therapy Treatment Note      Name: Irena Case  [facility] Number: [id number]     Therapy Diagnosis:        Encounter Diagnoses   Name Primary?    Left hip pain      Scoliosis, unspecified scoliosis type, unspecified spinal region      Chronic left hip pain      Decreased strength, endurance, and mobility      Postural instability of trunk          Physician: Mora Venegas, NP     Physician Orders: PT Eval and Treat   Medical Diagnosis from Referral: M25.552 (ICD-10-CM) - Left hip pain M41.9 (ICD-10-CM) - Scoliosis, unspecified scoliosis type, unspecified spinal region   Evaluation Date: 7/6/2023  Authorization Period Expiration: 10/28/2023   Plan of Care Expiration: 10/6/2023  Progress Note Due: 8/6/2023  Visit # / Visits authorized: 2/ 15  FOTO: 1/3     Precautions: Standard and scoliosis      Time In: 10 am  Time Out: 1045 am  Total Billable Time:  38 minutes     Subjective   Pt stated that had a hip injection this morning.  Over all she has been feeling better, is heading out of town this week for vacation.  Pain rated 1/10 in lateral hip    Objective       Postural examination/scapula alignment: scoliosis in lower LSP with L convexity, increased L femoral IR in standing (increases in SLS), slight PPT, anterior innominate rotation L with possible LLD   Sensory deficit: none  Reflexes: intact all     Thoracic/Lumbar AROM: Pain/Dysfunction with Movement:   Flexion WFL, fingertips 4" above toes, pinch pain in ant-lat left hip   Extension Mod duran, no pain   Right side bending WNL, fingertips to knee joint line, tension on L hip   Left side bending Mod duran, fingertips 2" above knee joint line, pinch pain on L ant-lat hip, limited 2* to scoliosis   Right rotation Min duran, no pain   Left rotation Min duran, tension over L hip      Hip ROM: ext = WNL, flex = WNL, IR = WNL/painful, ER = min duran/painful  Knee ROM: ext = WNL, flex = WNL     Lower Extremity Strength  Right " "LE   Left LE     Hip flexion: 5/5 Hip flexion: 4/5, pain   Hip extension:  5/5 Hip extension: 4+/5   Hip abduction: 4/5 Hip abduction: 4-/5, pain   Hip adduction:  5/5 Hip adduction:  5/5   Hip Internal rotation    4+/5 Hip Internal rotation 4-/5   Knee Flexion 5/5 Knee Flexion 4+/5   Knee Extension 5/5 Knee Extension 4+/5   Ankle dorsiflexion: 5/5 Ankle dorsiflexion: 5/5   Ankle plantarflexion: 5/5 Ankle plantarflexion: 5/5      Flexibility:   Derek test: Hip flexors: min duran (L)  Ely's: Quads: min duran (L)  Rosio test: ITB: NT  Hamstring (SLR): 80 deg alpa     Joint Mobility: mild hypo throughout LSP 2* scoliosis     Balance:SLS = 10" with mod sway, LOB    Treatment       Irena received the treatments listed below:       therapeutic exercises to develop strength, endurance, ROM, flexibility, posture, and core stabilization for 30 minutes including:  bridge with band x 30 x RTB at knees  Butt Winks 30x ea  clam x 30 x 5" holds x RTB at knees  reverse clam x 30 x 5" hold x YTB at ankles  figure 4 IR/ER hip stretch 3 x 30" ea  Seated piriformis stretch 3x 30 sec  Seated trunk flex/rotation stretch      manual therapy techniques: Joint mobilizations, Myofacial release, and Soft tissue Mobilization were applied to the: hip/back for 8 minutes, including:  - Trunk rotation stretch in SL with lateral gapping grade II  - Pirifomris release     neuromuscular re-education activities to improve: Balance, Coordination, Kinesthetic, Sense, Proprioception, and Posture for 8 minutes. The following activities were included:  modified side plank iwth hip abd x 20  mod side plank standing with hip abd -  demo'd for HEP     therapeutic activities to improve functional performance for 10  minutes, including:  Pt edu x 10' - see below        Patient Education and Home Exercises      Education provided:   - Time spent educating pt on stretches she can perform while n vacation, Use of heat and ice     Written Home Exercises Provided: yes. " Exercises were reviewed and Irena was able to demonstrate them prior to the end of the session.  Irena demonstrated good  understanding of the education provided. See EMR under Patient Instructions for exercises provided during therapy sessions.     Assessment      Irena Just received injection to hip so interventions were limitied.  She did not good pain relief following the manual intervention and limited stretching.  Reviewed HEP to continue while she is on vaction.   Goals:  Short Term Goals (4 Weeks):  1. Pt able to stand >=30 minutes with ADLs with <3/10 pain.  2. Pt able to walk >=30 minutes with household chores with <3/10 pain.  3. Pt able to ascend/descend curb, independently, 1 handrail, with <3/10 pain.  4. Pt to demonstrate improved functional ability with HOOS Jr limitation >=75%.     Long Term Goals (12 Weeks):  1. Pt able to stand >=1 hour with with ADLs with <3/10 pain.  2. Pt able to walk >=1 hour with household chores with <3/10 pain.  3.  Pt able to squat/lift >20# from floor to waist with <3/10 pain.  4. Pt able to ascend/descend 1 flight of stairs, independently, 1 handrail, with <3/10 pain.  5. Pt will be independent with HEP and self management of symptoms.   6. Pt to demonstrate improved functional ability with HOOS Jr limitation >=85%     Plan      Plan of care Certification: 7/6/2023 to 10/6/2023.     Pt to retunr once back from vacation

## 2023-08-25 DIAGNOSIS — I10 PRIMARY HYPERTENSION: ICD-10-CM

## 2023-08-25 RX ORDER — HYDROCHLOROTHIAZIDE 25 MG/1
TABLET ORAL
Qty: 90 TABLET | Refills: 0 | Status: SHIPPED | OUTPATIENT
Start: 2023-08-25 | End: 2023-10-31 | Stop reason: SDUPTHER

## 2023-08-25 RX ORDER — LISINOPRIL 40 MG/1
40 TABLET ORAL
Qty: 90 TABLET | Refills: 0 | Status: SHIPPED | OUTPATIENT
Start: 2023-08-25 | End: 2023-10-31 | Stop reason: SDUPTHER

## 2023-08-25 NOTE — TELEPHONE ENCOUNTER
Last Office Visit Info:   The patient's last visit with Sue Hansen MD was on 12/19/2022.    The patient's last visit in current department was on 7/12/2023.        Last CBC Results:   Lab Results   Component Value Date    WBC 8.07 04/14/2023    HGB 10.5 (L) 04/14/2023    HCT 32.2 (L) 04/14/2023     04/14/2023       Last CMP Results  Lab Results   Component Value Date     04/14/2023    K 4.5 04/14/2023     04/14/2023    CO2 24 04/14/2023    BUN 7 04/14/2023    CREATININE 0.6 04/14/2023    CALCIUM 8.9 04/14/2023    ALBUMIN 4.3 04/12/2023    AST 21 04/12/2023    ALT 20 04/12/2023       Last Lipids  Lab Results   Component Value Date    CHOL 193 09/09/2021    TRIG 53 09/09/2021    HDL 68 09/09/2021    LDLCALC 114.4 09/09/2021       Last A1C  Lab Results   Component Value Date    HGBA1C 5.1 09/09/2021       Last TSH  Lab Results   Component Value Date    TSH 1.381 09/09/2021             Current Med Refills  Medication List with Changes/Refills   Current Medications    CELECOXIB (CELEBREX) 200 MG CAPSULE    Take 1 capsule (200 mg total) by mouth every 12 (twelve) hours as needed for Pain.       Start Date: 6/28/2023 End Date: --    ESCITALOPRAM OXALATE (LEXAPRO) 20 MG TABLET    TAKE ONE TABLET BY MOUTH ONCE DAILY FOR MOOD Strength: 20 mg       Start Date: 12/19/2022End Date: --    GAVILYTE-G 236-22.74-6.74 -5.86 GRAM SUSPENSION           Start Date: 5/25/2023 End Date: --    HYDROCHLOROTHIAZIDE (HYDRODIURIL) 25 MG TABLET    TAKE ONE TABLET BY MOUTH ONCE DAILY       Start Date: 12/26/2022End Date: --    LISINOPRIL (PRINIVIL,ZESTRIL) 40 MG TABLET    TAKE ONE TABLET BY MOUTH ONCE DAILY       Start Date: 12/26/2022End Date: --    METHOCARBAMOL (ROBAXIN) 750 MG TAB    Take 1 tablet (750 mg total) by mouth every 4 to 6 hours as needed (muscle spasm).       Start Date: 6/28/2023 End Date: --    PROPRANOLOL (INDERAL LA) 60 MG 24 HR CAPSULE    Take 1 capsule (60 mg total) by mouth once daily.       Start  Date: 4/20/2023 End Date: 4/19/2024       Order(s) placed per written order guidelines: none    Please advise.

## 2023-08-25 NOTE — TELEPHONE ENCOUNTER
No care due was identified.  [facility] Embedded Care Due Messages. Reference number: [id number].   8/25/2023 5:56:53 AM CDT

## 2023-09-08 ENCOUNTER — PATIENT OUTREACH (OUTPATIENT)
Dept: ADMINISTRATIVE | Facility: HOSPITAL | Age: 48
End: 2023-09-08
Payer: OTHER GOVERNMENT

## 2023-09-08 ENCOUNTER — PATIENT MESSAGE (OUTPATIENT)
Dept: ADMINISTRATIVE | Facility: HOSPITAL | Age: 48
End: 2023-09-08
Payer: OTHER GOVERNMENT

## 2023-09-08 NOTE — PROGRESS NOTES
Health Maintenance Due   Topic Date Due    Pneumococcal Vaccines (Age 0-64) (1 - PCV) Never done    TETANUS VACCINE  Never done    Colorectal Cancer Screening  Never done    COVID-19 Vaccine (5 - Moderna series) 07/12/2022    Shingles Vaccine (1 of 2) Never done    Influenza Vaccine (1) 09/01/2023   Chart review done. HM updated. Immunizations reviewed & updated. Care Everywhere updated.  PORTAL MESSAGE SENT TO THE PATIENT ABOUT THE COLON CANCER SCREENING OPTIONS

## 2023-09-13 ENCOUNTER — PATIENT OUTREACH (OUTPATIENT)
Dept: ADMINISTRATIVE | Facility: HOSPITAL | Age: 48
End: 2023-09-13
Payer: OTHER GOVERNMENT

## 2023-09-13 DIAGNOSIS — Z12.11 COLON CANCER SCREENING: Primary | ICD-10-CM

## 2023-09-13 NOTE — PROGRESS NOTES
Health Maintenance Due   Topic Date Due    Pneumococcal Vaccines (Age 0-64) (1 - PCV) Never done    TETANUS VACCINE  Never done    Colorectal Cancer Screening  Never done    COVID-19 Vaccine (5 - Moderna series) 07/12/2022    Shingles Vaccine (1 of 2) Never done    Influenza Vaccine (1) 09/01/2023   Chart review done. HM updated. Immunizations reviewed & updated. Care Everywhere updated.  ORDER PLACED FOR COLONOSCOPY AS PER PATIENT. WILL SCHEDULE PAT APPOINTMENT

## 2023-09-15 ENCOUNTER — PATIENT MESSAGE (OUTPATIENT)
Dept: FAMILY MEDICINE | Facility: CLINIC | Age: 48
End: 2023-09-15
Payer: OTHER GOVERNMENT

## 2023-09-20 ENCOUNTER — OFFICE VISIT (OUTPATIENT)
Dept: FAMILY MEDICINE | Facility: CLINIC | Age: 48
End: 2023-09-20
Payer: OTHER GOVERNMENT

## 2023-09-20 VITALS
RESPIRATION RATE: 16 BRPM | SYSTOLIC BLOOD PRESSURE: 120 MMHG | WEIGHT: 134.69 LBS | TEMPERATURE: 99 F | BODY MASS INDEX: 25.43 KG/M2 | HEIGHT: 61 IN | OXYGEN SATURATION: 99 % | HEART RATE: 74 BPM | DIASTOLIC BLOOD PRESSURE: 86 MMHG

## 2023-09-20 DIAGNOSIS — J30.2 SEASONAL ALLERGIC RHINITIS, UNSPECIFIED TRIGGER: ICD-10-CM

## 2023-09-20 DIAGNOSIS — M25.552 LEFT HIP PAIN: Primary | ICD-10-CM

## 2023-09-20 PROCEDURE — 96372 THER/PROPH/DIAG INJ SC/IM: CPT | Mod: PBBFAC,PO

## 2023-09-20 PROCEDURE — 99999PBSHW PR PBB SHADOW TECHNICAL ONLY FILED TO HB: ICD-10-PCS | Mod: PBBFAC,,,

## 2023-09-20 PROCEDURE — 99215 OFFICE O/P EST HI 40 MIN: CPT | Mod: 25,PBBFAC,PO | Performed by: NURSE PRACTITIONER

## 2023-09-20 PROCEDURE — 99999PBSHW PR PBB SHADOW TECHNICAL ONLY FILED TO HB: Mod: PBBFAC,,,

## 2023-09-20 PROCEDURE — 99999 PR PBB SHADOW E&M-EST. PATIENT-LVL V: ICD-10-PCS | Mod: PBBFAC,,, | Performed by: NURSE PRACTITIONER

## 2023-09-20 PROCEDURE — 99214 PR OFFICE/OUTPT VISIT, EST, LEVL IV, 30-39 MIN: ICD-10-PCS | Mod: S$PBB,,, | Performed by: NURSE PRACTITIONER

## 2023-09-20 PROCEDURE — 99999 PR PBB SHADOW E&M-EST. PATIENT-LVL V: CPT | Mod: PBBFAC,,, | Performed by: NURSE PRACTITIONER

## 2023-09-20 PROCEDURE — 99214 OFFICE O/P EST MOD 30 MIN: CPT | Mod: S$PBB,,, | Performed by: NURSE PRACTITIONER

## 2023-09-20 RX ORDER — BROMPHENIRAMINE MALEATE, PSEUDOEPHEDRINE HYDROCHLORIDE, AND DEXTROMETHORPHAN HYDROBROMIDE 2; 30; 10 MG/5ML; MG/5ML; MG/5ML
5 SYRUP ORAL
Qty: 480 ML | Refills: 0 | Status: SHIPPED | OUTPATIENT
Start: 2023-09-20 | End: 2024-01-11

## 2023-09-20 RX ORDER — METHYLPREDNISOLONE ACETATE 80 MG/ML
80 INJECTION, SUSPENSION INTRA-ARTICULAR; INTRALESIONAL; INTRAMUSCULAR; SOFT TISSUE
Status: COMPLETED | OUTPATIENT
Start: 2023-09-20 | End: 2023-09-20

## 2023-09-20 RX ORDER — FLUTICASONE PROPIONATE 50 MCG
SPRAY, SUSPENSION (ML) NASAL
COMMUNITY
End: 2024-01-11

## 2023-09-20 RX ORDER — LEVOCETIRIZINE DIHYDROCHLORIDE 5 MG/1
5 TABLET, FILM COATED ORAL NIGHTLY
Qty: 30 TABLET | Refills: 11 | Status: SHIPPED | OUTPATIENT
Start: 2023-09-20 | End: 2024-01-11 | Stop reason: SDUPTHER

## 2023-09-20 RX ADMIN — METHYLPREDNISOLONE ACETATE 80 MG: 80 INJECTION, SUSPENSION INTRALESIONAL; INTRAMUSCULAR; INTRASYNOVIAL; SOFT TISSUE at 09:09

## 2023-09-22 ENCOUNTER — CLINICAL SUPPORT (OUTPATIENT)
Dept: ENDOSCOPY | Facility: HOSPITAL | Age: 48
End: 2023-09-22
Attending: INTERNAL MEDICINE
Payer: OTHER GOVERNMENT

## 2023-09-22 ENCOUNTER — TELEPHONE (OUTPATIENT)
Dept: ENDOSCOPY | Facility: HOSPITAL | Age: 48
End: 2023-09-22

## 2023-09-22 VITALS — BODY MASS INDEX: 25.3 KG/M2 | HEIGHT: 61 IN | WEIGHT: 134 LBS

## 2023-09-22 DIAGNOSIS — Z12.11 COLON CANCER SCREENING: ICD-10-CM

## 2023-09-22 NOTE — TELEPHONE ENCOUNTER
Contacted the patient to schedule an endoscopy procedure(s) colonoscopy. Spoke with patient. Patient offered dated of 12/27, 12/28, and 12/29. Patient declined. Patient stated she would like to schedule January. Patient informed the January schedule is not available at this time but will be opening in October. Patient given phone number to the endoscopy scheduling department [phone] to call back in October. Patient verbalized understanding.

## 2023-09-22 NOTE — PLAN OF CARE
Contacted the patient to schedule an endoscopy procedure(s) colonoscopy. Spoke with patient. Patient offered dated of 12/27, 12/28, and 12/29. Patient declined. Patient stated she would like to schedule January. Patient informed the January schedule is not available at this time but will be opening in October. Patient given phone number to the endoscopy scheduling department [phone] to call back in October. Patient verbalized understanding.

## 2023-09-24 NOTE — PROGRESS NOTES
Subjective:      Patient ID: Irena Case is a 48 y.o. female.  Pt returns to clinic with flare of chronic left hip pain. She denies new trauma, injury or fall. Pain has worsened since returning to school and walking up and down stairs daily. She is taking tylenol, ibuprofen though does have celebrex and robaxin that she is not taking, has also received pain relief with PT in the past and is interested in resuming. She is also having a flare of chronic sinus allergies.     Hip Pain   There was no injury mechanism. The pain is present in the left hip. The quality of the pain is described as aching, shooting and stabbing. The pain is moderate. The pain has been Constant since onset. Associated symptoms include a loss of motion and muscle weakness. Pertinent negatives include no inability to bear weight, loss of sensation, numbness or tingling. She reports no foreign bodies present. The symptoms are aggravated by movement and weight bearing. She has tried rest, acetaminophen and NSAIDs for the symptoms. The treatment provided no relief.   Sinusitis  This is a chronic problem. The current episode started 1 to 4 weeks ago. The problem has been gradually worsening since onset. There has been no fever. The pain is mild. Associated symptoms include congestion, coughing, ear pain, headaches, sinus pressure, sneezing and a sore throat. Pertinent negatives include no chills, diaphoresis, hoarse voice, neck pain, shortness of breath or swollen glands. Past treatments include nothing. The treatment provided no relief.     Review of Systems   Constitutional:  Positive for fatigue. Negative for activity change, appetite change, chills, diaphoresis, fever and unexpected weight change.   HENT:  Positive for nasal congestion, ear pain, postnasal drip, rhinorrhea, sinus pressure/congestion, sneezing and sore throat. Negative for hoarse voice, tinnitus, trouble swallowing and voice change.    Eyes: Negative.  Negative for photophobia,  "pain and visual disturbance.   Respiratory:  Positive for cough. Negative for chest tightness, shortness of breath and wheezing.    Cardiovascular:  Negative for chest pain and palpitations.   Gastrointestinal:  Negative for abdominal pain, change in bowel habit, constipation, diarrhea, nausea and vomiting.   Endocrine: Negative.    Genitourinary:  Negative for difficulty urinating, dysuria and menstrual problem.   Musculoskeletal:  Positive for arthralgias and gait problem. Negative for back pain, joint swelling, leg pain, myalgias, neck pain, neck stiffness and joint deformity.   Integumentary:  Negative for rash.   Allergic/Immunologic: Positive for environmental allergies.   Neurological:  Positive for headaches. Negative for dizziness, vertigo, tingling, tremors, seizures, syncope, facial asymmetry, speech difficulty, weakness, light-headedness, numbness, memory loss and coordination difficulties.   Hematological: Negative.    Psychiatric/Behavioral: Negative.     All other systems reviewed and are negative.        Objective:     Vitals:    09/20/23 0903   BP: 120/86   Pulse: 74   Resp: 16   Temp: 98.9 °F (37.2 °C)   TempSrc: Oral   SpO2: 99%   Weight: 61.1 kg (134 lb 11.2 oz)   Height: 5' 1" (1.549 m)     Physical Exam  Vitals and nursing note reviewed.   Constitutional:       General: She is not in acute distress.     Appearance: Normal appearance. She is well-developed and well-groomed. She is not ill-appearing.   HENT:      Head: Normocephalic and atraumatic.      Right Ear: Ear canal and external ear normal. No mastoid tenderness. Tympanic membrane is injected.      Left Ear: Ear canal and external ear normal. No mastoid tenderness. Tympanic membrane is injected.      Nose: Mucosal edema and rhinorrhea present. No congestion. Rhinorrhea is clear.      Right Sinus: Maxillary sinus tenderness and frontal sinus tenderness present.      Left Sinus: Maxillary sinus tenderness and frontal sinus tenderness " present.      Mouth/Throat:      Lips: Pink.      Mouth: Mucous membranes are moist.      Pharynx: Oropharynx is clear. Uvula midline.   Eyes:      General: Lids are normal. Vision grossly intact. Gaze aligned appropriately. Allergic shiner present.      Extraocular Movements: Extraocular movements intact.      Conjunctiva/sclera: Conjunctivae normal.      Pupils: Pupils are equal, round, and reactive to light.   Neck:      Trachea: Phonation normal.   Cardiovascular:      Rate and Rhythm: Normal rate and regular rhythm.      Heart sounds: Normal heart sounds.   Pulmonary:      Effort: Pulmonary effort is normal. No accessory muscle usage or respiratory distress.      Breath sounds: Normal breath sounds and air entry.   Abdominal:      General: Abdomen is flat. Bowel sounds are normal. There is no distension.      Palpations: Abdomen is soft.      Tenderness: There is no abdominal tenderness.   Musculoskeletal:      Cervical back: Neck supple.      Lumbar back: Normal.      Right lower leg: Normal. No edema.      Left lower leg: Normal. No edema.   Lymphadenopathy:      Cervical: No cervical adenopathy.   Skin:     General: Skin is warm and dry.      Findings: No rash.   Neurological:      General: No focal deficit present.      Mental Status: She is alert and oriented to person, place, and time. Mental status is at baseline.      Sensory: Sensation is intact.      Motor: Motor function is intact.      Coordination: Coordination is intact.      Gait: Gait is intact.   Psychiatric:         Attention and Perception: Attention and perception normal.         Mood and Affect: Mood and affect normal.         Speech: Speech normal.         Behavior: Behavior normal. Behavior is cooperative.         Thought Content: Thought content normal.         Cognition and Memory: Cognition and memory normal.         Judgment: Judgment normal.       Assessment and Plan:     1. Left hip pain  Acute flare of chronic pain without new  trauma, proceed with conservative measures and resume PT, f/u with ortho if fails to improve or worsen    - Ambulatory referral/consult to Physical/Occupational Therapy; Future  - methylPREDNISolone acetate injection 80 mg    2. Seasonal allergic rhinitis, unspecified trigger  No acute infection noted, treat inflammatory flare  Symptomatic therapy suggested: rest, increase fluids, gargle prn for sore throat, apply heat to sinuses prn, use mist of vaporizer prn, OTC acetaminophen, ibuprofen, and call prn if symptoms persist or worsen. Call or return to clinic prn if these symptoms worsen or fail to improve as anticipated.  - levocetirizine (XYZAL) 5 MG tablet; Take 1 tablet (5 mg total) by mouth every evening.  Dispense: 30 tablet; Refill: 11  - brompheniramine-pseudoeph-DM (BROMFED DM) 2-30-10 mg/5 mL Syrp; Take 5 mLs by mouth every 4 to 6 hours as needed (cough, congestion).  Dispense: 480 mL; Refill: 0           JULIA Rodriguez, FNP-C  Family/Internal Medicine  [facility]

## 2023-10-05 ENCOUNTER — TELEPHONE (OUTPATIENT)
Dept: ENDOSCOPY | Facility: HOSPITAL | Age: 48
End: 2023-10-05
Payer: OTHER GOVERNMENT

## 2023-10-05 NOTE — TELEPHONE ENCOUNTER
Contacted the patient to schedule an endoscopy procedure(s) collonoscopy. The patient did not answer the call and left a voice message requesting a call back.

## 2023-10-09 ENCOUNTER — TELEPHONE (OUTPATIENT)
Dept: ENDOSCOPY | Facility: HOSPITAL | Age: 48
End: 2023-10-09
Payer: OTHER GOVERNMENT

## 2023-10-09 DIAGNOSIS — Z12.11 COLON CANCER SCREENING: ICD-10-CM

## 2023-10-09 DIAGNOSIS — Z12.11 SPECIAL SCREENING FOR MALIGNANT NEOPLASMS, COLON: Primary | ICD-10-CM

## 2023-10-09 NOTE — TELEPHONE ENCOUNTER
Spoke to p[atient to schedule procedure(s) Colonoscopy       Physician to perform procedure(s) Dr. DUGLAS Mena   Date of Procedure (s) 1/26/24  Arrival Time 12:00 PM  Time of Procedure(s) 1:00 PM   Location of Procedure(s) [facility] 2nd Floor  Type of Rx Prep sent to patient: PEG  Instructions provided to patient via [facility]    Patient was informed on the following information and verbalized understanding. Screening questionnaire reviewed with patient and complete. If procedure requires anesthesia, a responsible adult needs to be present to accompany the patient home, patient cannot drive after receiving anesthesia. Appointment details are tentative, especially check-in time. Patient will receive a prep-op call 4 days prior to confirm check-in time for procedure. If applicable the patient should contact their pharmacy to verify Rx for procedure prep is ready for pick-up. Patient was advised to call the scheduling department at [phone] if pharmacy states no Rx is available. Patient was advised to call the endoscopy scheduling department if any questions or concerns arise.      [facility] Scheduling Department

## 2023-10-31 ENCOUNTER — PATIENT MESSAGE (OUTPATIENT)
Dept: FAMILY MEDICINE | Facility: CLINIC | Age: 48
End: 2023-10-31
Payer: OTHER GOVERNMENT

## 2023-10-31 DIAGNOSIS — F33.1 MODERATE EPISODE OF RECURRENT MAJOR DEPRESSIVE DISORDER: ICD-10-CM

## 2023-10-31 DIAGNOSIS — I10 PRIMARY HYPERTENSION: ICD-10-CM

## 2023-10-31 RX ORDER — ESCITALOPRAM OXALATE 20 MG/1
TABLET ORAL
Qty: 90 TABLET | Refills: 0 | Status: SHIPPED | OUTPATIENT
Start: 2023-10-31 | End: 2024-02-15 | Stop reason: SDUPTHER

## 2023-10-31 RX ORDER — LISINOPRIL 40 MG/1
40 TABLET ORAL DAILY
Qty: 90 TABLET | Refills: 0 | Status: SHIPPED | OUTPATIENT
Start: 2023-10-31 | End: 2024-02-15 | Stop reason: SDUPTHER

## 2023-10-31 RX ORDER — HYDROCHLOROTHIAZIDE 25 MG/1
TABLET ORAL
Qty: 90 TABLET | Refills: 0 | Status: SHIPPED | OUTPATIENT
Start: 2023-10-31 | End: 2024-02-15 | Stop reason: SDUPTHER

## 2023-10-31 NOTE — TELEPHONE ENCOUNTER
No care due was identified.  [facility] Embedded Care Due Messages. Reference number: [id number].   10/31/2023 12:17:43 PM CDT

## 2023-12-28 ENCOUNTER — E-VISIT (OUTPATIENT)
Dept: FAMILY MEDICINE | Facility: CLINIC | Age: 48
End: 2023-12-28
Payer: OTHER GOVERNMENT

## 2023-12-28 DIAGNOSIS — J01.00 SUBACUTE MAXILLARY SINUSITIS: Primary | ICD-10-CM

## 2023-12-28 PROCEDURE — 99422 PR E&M, ONLINE DIGIT, EST, < 7 DAYS,  11-20 MINS: ICD-10-PCS | Mod: ,,, | Performed by: STUDENT IN AN ORGANIZED HEALTH CARE EDUCATION/TRAINING PROGRAM

## 2023-12-28 PROCEDURE — 99422 OL DIG E/M SVC 11-20 MIN: CPT | Mod: ,,, | Performed by: STUDENT IN AN ORGANIZED HEALTH CARE EDUCATION/TRAINING PROGRAM

## 2023-12-28 RX ORDER — AMOXICILLIN AND CLAVULANATE POTASSIUM 875; 125 MG/1; MG/1
1 TABLET, FILM COATED ORAL 2 TIMES DAILY
Qty: 14 TABLET | Refills: 0 | Status: SHIPPED | OUTPATIENT
Start: 2023-12-28 | End: 2024-01-04

## 2023-12-28 RX ORDER — METHYLPREDNISOLONE 4 MG/1
TABLET ORAL
Qty: 21 TABLET | Refills: 0 | Status: SHIPPED | OUTPATIENT
Start: 2023-12-28 | End: 2024-02-15

## 2023-12-28 RX ORDER — OXYMETAZOLINE HCL 0.05 %
2 SPRAY, NON-AEROSOL (ML) NASAL 2 TIMES DAILY
Qty: 6 ML | Refills: 0 | Status: SHIPPED | OUTPATIENT
Start: 2023-12-28 | End: 2023-12-31

## 2023-12-28 NOTE — PROGRESS NOTES
Patient ID: Irena Case is a 48 y.o. female.    Chief Complaint: Sinusitis          274}  The patient initiated a request through [employer] on 12/28/2023 for evaluation and management with a chief complaint of Sinusitis     I evaluated the questionnaire submission on 12/28/2023 .    [facility] Peq Evisit Upper Respitatory/Cough Questionnaire    12/28/2023  8:09 AM CST - Filed by Patient   Do you agree to participate in an E-Visit? Yes   If you have any of the following symptoms, please present to your local ER or call 911:  I acknowledge   What is the main issue that you would like for your doctor to address today? Congested ears/head. Ears feel like they are stopped up. Every since an airplane flight last week. Head congested since before flight.   Are you able to take your vital signs? No   Are you currently pregnant, could you be pregnant, or are you breast feeding? None of the above   What symptoms do you currently have?  Headache;  Nasal Congestion;  Pain around the nose and face;  Ear pain;  Neck pain   Have you ever smoked? I currently smoke   Have you had a fever? No   When did your symptoms first appear? 12/22/2023   In the last two weeks, have you been in close contact with someone who has COVID-19 or the Flu? No   In the last two weeks, have you worked or volunteered in a healthcare facility or as a ? Healthcare facilities include a hospital, medical or dental clinic, long-term care facility, or nursing home No   Do you live in a long-term care facility, nursing home, group home, or homeless shelter? No   List what you have done or taken to help your symptoms. Claritin-D, Mucinex-D, ear ache drops   How severe are your symptoms? Moderate   Have your symptoms improved since they first appeared? No change   Have you taken an at home Covid test? No   Have you taken a Flu test? No   Have you been fully vaccinated for COVID? (2 Pfizer, 2 Moderna or 1 Jayy & Jayy vaccine injections) Yes   Have you  received a booster? Yes   Have you recieved a Flu shot? No   Do you have transportation to get tested for COVID if it is indicated and ordered for you at an [facility] location? Yes   Provide any information you feel is important to your history not asked above Recently visited [facility]. Ears became stopped up after plane ride. Sinus pressure/congestion was present prior to trip.   Please attach any relevant images or files           Active Problem List with Overview Notes    Diagnosis Date Noted    Chronic left hip pain 07/06/2023    Decreased strength, endurance, and mobility 07/06/2023    Postural instability of trunk 07/06/2023    Prolapsing mitral leaflet syndrome 04/18/2023    Migraine headache 04/18/2023     trial of midrin      Menopausal and female climacteric states 04/18/2023    Astigmatism 04/18/2023    Chronic sinusitis 04/18/2023    Cardiac murmur, unspecified 04/18/2023    Allergic rhinitis 04/18/2023    S/P appendectomy 04/13/2023    Positive ELIJAH (antinuclear antibody) 09/21/2021    Fatty liver 09/14/2021    Hepatic cyst 09/14/2021    Moderate episode of recurrent major depressive disorder 09/08/2021    Hypertension     Endometriosis     Scoliosis deformity of spine 10/31/2018    Tobacco use 10/11/2013    Inattention 10/11/2013      Recent Labs Obtained:  Lab Results   Component Value Date    WBC 8.07 04/14/2023    HGB 10.5 (L) 04/14/2023    HCT 32.2 (L) 04/14/2023    MCV 93 04/14/2023     04/14/2023     04/14/2023    K 4.5 04/14/2023     (H) 04/14/2023    CREATININE 0.6 04/14/2023    EGFRNORACEVR >60 04/14/2023    HGBA1C 5.1 09/09/2021      Review of patient's allergies indicates:  No Known Allergies    Encounter Diagnosis   Name Primary?    Subacute maxillary sinusitis Yes        No orders of the defined types were placed in this encounter.     Medications Ordered This Encounter   Medications    amoxicillin-clavulanate 875-125mg (AUGMENTIN) 875-125 mg per tablet     Sig: Take 1  tablet by mouth 2 (two) times daily. for 7 days     Dispense:  14 tablet     Refill:  0    methylPREDNISolone (MEDROL DOSEPACK) 4 mg tablet     Sig: follow package directions     Dispense:  21 tablet     Refill:  0    oxymetazoline (AFRIN, OXYMETAZOLINE,) 0.05 % nasal spray     Si sprays by Nasal route 2 (two) times daily. Do not take over 3 days due to rebound congestion for 3 days     Dispense:  6 mL     Refill:  0        E-Visit Time Tracking:    Day 1 Time (in minutes): 13     Total Time (in minutes): 13    This is the extent of this pleasant patient's concerns at this present time. She did not feel chest pain upon exertion. No unilateral leg swelling, calf tenderness, or calf pain.    274}

## 2023-12-29 ENCOUNTER — HOSPITAL ENCOUNTER (EMERGENCY)
Facility: HOSPITAL | Age: 48
Discharge: HOME | End: 2023-12-29
Attending: EMERGENCY MEDICINE
Payer: OTHER GOVERNMENT

## 2023-12-29 VITALS
WEIGHT: 134 LBS | HEIGHT: 61 IN | HEART RATE: 84 BPM | TEMPERATURE: 98 F | SYSTOLIC BLOOD PRESSURE: 167 MMHG | DIASTOLIC BLOOD PRESSURE: 85 MMHG | RESPIRATION RATE: 17 BRPM | BODY MASS INDEX: 25.3 KG/M2 | OXYGEN SATURATION: 100 %

## 2023-12-29 DIAGNOSIS — S61.211A LACERATION OF LEFT INDEX FINGER WITHOUT FOREIGN BODY WITHOUT DAMAGE TO NAIL, INITIAL ENCOUNTER: Primary | ICD-10-CM

## 2023-12-29 PROCEDURE — 25000003 PHARM REV CODE 250

## 2023-12-29 PROCEDURE — 63600175 PHARM REV CODE 636 W HCPCS

## 2023-12-29 PROCEDURE — 99283 EMERGENCY DEPT VISIT LOW MDM: CPT

## 2023-12-29 PROCEDURE — 90715 TDAP VACCINE 7 YRS/> IM: CPT

## 2023-12-29 PROCEDURE — 25000003 PHARM REV CODE 250: Performed by: PHYSICIAN ASSISTANT

## 2023-12-29 PROCEDURE — 90471 IMMUNIZATION ADMIN: CPT

## 2023-12-29 RX ORDER — MUPIROCIN 20 MG/G
1 OINTMENT TOPICAL
Status: COMPLETED | OUTPATIENT
Start: 2023-12-29 | End: 2023-12-29

## 2023-12-29 RX ADMIN — TETANUS TOXOID, REDUCED DIPHTHERIA TOXOID AND ACELLULAR PERTUSSIS VACCINE, ADSORBED 0.5 ML: 5; 2.5; 8; 8; 2.5 SUSPENSION INTRAMUSCULAR at 05:12

## 2023-12-29 RX ADMIN — MUPIROCIN 1 TUBE: 20 OINTMENT TOPICAL at 05:12

## 2023-12-29 RX ADMIN — Medication: at 05:12

## 2023-12-29 NOTE — ED PROVIDER NOTES
Encounter Date: 12/29/2023    SCRIBE #1 NOTE: I, Rain Sanchez, am scribing for, and in the presence of,  Michelle Baltazar PA-C. I have scribed the following portions of the note - Other sections scribed: HPI, ROS, PE.       History     Chief Complaint   Patient presents with    Finger Injury     49 yo fem to triage for right pointer finger injury. Pt cut tip of her finger while using a . Bleeding noted to finger in triage, bandaged and rewrapped. VSS, NAD, AAOx4     48-year-old female with PMHx of HTN, presents to the ED with laceration to the left index finger onset PTA. Patient states she cut the very tip of the finger off while using a new  for making crafts. No known drug allergies.     The history is provided by the patient. No  was used.     Review of patient's allergies indicates:  No Known Allergies  Past Medical History:   Diagnosis Date    Endometriosis     Fatty liver     Hypertension      Past Surgical History:   Procedure Laterality Date    APPENDECTOMY      BREAST BIOPSY Left     benign    HYSTERECTOMY      LAPAROSCOPIC APPENDECTOMY N/A 04/13/2023    Procedure: APPENDECTOMY, LAPAROSCOPIC;  Surgeon: Niall Benjamin MD;  Location: [facility] OR;  Service: General;  Laterality: N/A;    LASIK Bilateral     TOTAL ABDOMINAL HYSTERECTOMY W/ BILATERAL SALPINGOOPHORECTOMY      Pt still has her cervix in place     Family History   Problem Relation Age of Onset    COPD Mother     Asthma Mother     Hypertension Mother     Heart failure Mother     Arthritis Mother     Heart disease Father         s/p quadruple bypass    Cirrhosis Neg Hx      Social History     Tobacco Use    Smoking status: Some Days     Types: Vaping with nicotine    Smokeless tobacco: Never   Substance Use Topics    Alcohol use: Yes     Alcohol/week: 2.0 standard drinks of alcohol     Types: 2 Glasses of wine per week     Comment: daily    Drug use: Never     Review of Systems   Constitutional:  Negative for  chills, fatigue and fever.   HENT:  Negative for congestion and sore throat.    Eyes:  Negative for visual disturbance.   Respiratory:  Negative for cough and shortness of breath.    Cardiovascular:  Negative for chest pain.   Gastrointestinal:  Negative for abdominal pain, nausea and vomiting.   Genitourinary:  Negative for difficulty urinating.   Musculoskeletal:  Negative for back pain.   Skin:  Positive for wound (lac to left index finger). Negative for rash.   Neurological:  Negative for dizziness, syncope and headaches.       Physical Exam     Initial Vitals [12/29/23 1627]   BP Pulse Resp Temp SpO2   (!) 167/85 84 17 98.2 °F (36.8 °C) 100 %      MAP       --         Physical Exam    Nursing note and vitals reviewed.  Constitutional: She appears well-developed and well-nourished. She is not diaphoretic. No distress.   HENT:   Head: Normocephalic and atraumatic.   Right Ear: External ear normal.   Left Ear: External ear normal.   Cardiovascular:  Normal rate, regular rhythm and intact distal pulses.           Pulmonary/Chest: Breath sounds normal. No respiratory distress.   Abdominal: Abdomen is soft. Bowel sounds are normal. There is no abdominal tenderness.   Musculoskeletal:      Comments: Normal ROM of left pointer finger.      Neurological: She is alert and oriented to person, place, and time. GCS score is 15. GCS eye subscore is 4. GCS verbal subscore is 5. GCS motor subscore is 6.   Normal sensation of left pointer finger.    Skin: Skin is warm and dry.   Approximately 1cm superficial open wound noted to the medial aspect of distal tip of the left pointer finger, consistent with patient slicing off a small piece of her finger. There is active bleeding. No foreign body noted. Normal capillary refill of the left pointer finger. Normal ROM of this digit.    Psychiatric: She has a normal mood and affect. Her behavior is normal.         ED Course   Procedures  Labs Reviewed - No data to display       Imaging  Results    None          Medications   LETS (LIDOcaine-TETRAcaine-EPINEPHrine) gel solution ( Topical (Top) Not Given 12/29/23 1800)   Tdap (BOOSTRIX) vaccine injection 0.5 mL (0.5 mLs Intramuscular Given 12/29/23 1740)   mupirocin 2 % ointment 1 Tube (1 Tube Topical (Top) Given 12/29/23 1741)   LETS (LIDOcaine-TETRAcaine-EPINEPHrine) gel solution ( Topical (Top) Given 12/29/23 1741)     Medical Decision Making  48-year-old female with PMHx of HTN, presents to the ED with laceration to the left index finger onset PTA. Patient states she cut the very tip of the finger off while using a new  for making crafts. No known drug allergies. Exam above.  As patient has accidentally removed a small portion of her finger, we will not be able to reattach the portion that she cut off. LET applied to area. The wound was thoroughly irrigated, and bacitracin follow up by pressure dressing applied.  Tetanus shot updated.  Patient is sent home with a nonadherent gauze and counseled on wound care.  Return precautions discussed.            Scribe Attestation:   Scribe #1: I performed the above scribed service and the documentation accurately describes the services I performed. I attest to the accuracy of the note.           Scribe attestation: I, Michelle Baltazar, personally performed the services described in this documentation. All medical record entries made by the scribe were at my direction and in my presence.  I have reviewed the chart and agree that the record reflects my personal performance and is accurate and complete.                     Clinical Impression:  Final diagnoses:  [S61.211A] Laceration of left index finger without foreign body without damage to nail, initial encounter (Primary)          ED Disposition Condition    Discharge Stable          ED Prescriptions    None       Follow-up Information       Follow up With Specialties Details Why Contact Info    Sue Hansen MD Internal Medicine, Wound Care  Schedule an appointment as soon as possible for a visit in 2 days For follow up [id number] [address]  [address]  [place] [address]  [phone]      [facility] - Emergency Dept Emergency Medicine Go to  As needed, If symptoms worsen [address] [facility] [place] [id number] [phone]             Michelle Baltazar PA-C  12/29/23 5009

## 2023-12-29 NOTE — ED TRIAGE NOTES
Finger Injury (49 yo fem to triage for right pointer finger injury. Pt cut tip of her finger while using a . Bleeding noted to finger in triage, bandaged and rewrapped. VSS, NAD, AAOx4)

## 2023-12-30 NOTE — DISCHARGE INSTRUCTIONS

## 2024-01-11 ENCOUNTER — OFFICE VISIT (OUTPATIENT)
Dept: FAMILY MEDICINE | Facility: CLINIC | Age: 49
End: 2024-01-11
Payer: OTHER GOVERNMENT

## 2024-01-11 ENCOUNTER — PATIENT MESSAGE (OUTPATIENT)
Dept: FAMILY MEDICINE | Facility: CLINIC | Age: 49
End: 2024-01-11

## 2024-01-11 VITALS
RESPIRATION RATE: 15 BRPM | SYSTOLIC BLOOD PRESSURE: 128 MMHG | WEIGHT: 136.44 LBS | HEART RATE: 81 BPM | HEIGHT: 61 IN | BODY MASS INDEX: 25.76 KG/M2 | OXYGEN SATURATION: 99 % | DIASTOLIC BLOOD PRESSURE: 70 MMHG | TEMPERATURE: 98 F

## 2024-01-11 DIAGNOSIS — H65.93 MIDDLE EAR EFFUSION, BILATERAL: Primary | ICD-10-CM

## 2024-01-11 PROCEDURE — 99215 OFFICE O/P EST HI 40 MIN: CPT | Mod: PBBFAC,PN | Performed by: NURSE PRACTITIONER

## 2024-01-11 PROCEDURE — 99999 PR PBB SHADOW E&M-EST. PATIENT-LVL V: CPT | Mod: PBBFAC,,, | Performed by: NURSE PRACTITIONER

## 2024-01-11 PROCEDURE — 99213 OFFICE O/P EST LOW 20 MIN: CPT | Mod: S$PBB,,, | Performed by: NURSE PRACTITIONER

## 2024-01-11 RX ORDER — FLUTICASONE PROPIONATE 50 MCG
2 SPRAY, SUSPENSION (ML) NASAL DAILY
Qty: 15.8 ML | Refills: 1 | Status: SHIPPED | OUTPATIENT
Start: 2024-01-11

## 2024-01-11 RX ORDER — LEVOCETIRIZINE DIHYDROCHLORIDE 5 MG/1
5 TABLET, FILM COATED ORAL NIGHTLY
Qty: 30 TABLET | Refills: 11 | Status: SHIPPED | OUTPATIENT
Start: 2024-01-11 | End: 2025-01-10

## 2024-01-11 RX ORDER — AZELASTINE 1 MG/ML
1 SPRAY, METERED NASAL 2 TIMES DAILY
Qty: 30 ML | Refills: 1 | Status: SHIPPED | OUTPATIENT
Start: 2024-01-11 | End: 2025-01-10

## 2024-01-11 NOTE — PROGRESS NOTES
Routine Office Visit    Patient Name: Irena Case    : 1975  MRN: [id number]    Chief Complaint:  Ear fullness    Subjective:  Irena is a 48 y.o. female who presents today for:    Ear fullness - patient who is new to me with the below documented medical history reports today for evaluation.  For the last 3 weeks has been having bilateral ear fullness with the muffled hearing in both ears.  Symptoms started after getting off the plane when she returned home from a  vacation.  She did do an E visit for a sinus infection and was given Augmentin and steroids both of which did not help with the symptoms very much.  She does feel nasal congestion intermittently but denies any nasal blockage.  Denies any tinnitus or ear discharge.  She has been using Claritin D and Mucinex as well as daily Flonase without significant benefit.    Past Medical History  Past Medical History:   Diagnosis Date    Endometriosis     Fatty liver     Hypertension        Family History  Family History   Problem Relation Age of Onset    COPD Mother     Asthma Mother     Hypertension Mother     Heart failure Mother     Arthritis Mother     Heart disease Father         s/p quadruple bypass    Cirrhosis Neg Hx        Current Medications  Current Outpatient Medications on File Prior to Visit   Medication Sig Dispense Refill    celecoxib (CELEBREX) 200 MG capsule Take 1 capsule (200 mg total) by mouth every 12 (twelve) hours as needed for Pain. 60 capsule 0    EScitalopram oxalate (LEXAPRO) 20 MG tablet TAKE ONE TABLET BY MOUTH ONCE DAILY FOR MOOD Strength: 20 mg 90 tablet 0    hydroCHLOROthiazide (HYDRODIURIL) 25 MG tablet Take one tablet by mouth every day for fluid and blood pressure 90 tablet 0    lisinopriL (PRINIVIL,ZESTRIL) 40 MG tablet Take 1 tablet (40 mg total) by mouth once daily. 90 tablet 0    methocarbamoL (ROBAXIN) 750 MG Tab Take 1 tablet (750 mg total) by mouth every 4 to 6 hours as needed (muscle spasm). 60 tablet 0     "methylPREDNISolone (MEDROL DOSEPACK) 4 mg tablet follow package directions 21 tablet 0    propranoloL (INDERAL LA) 60 MG 24 hr capsule Take 1 capsule (60 mg total) by mouth once daily. 30 capsule 0    [DISCONTINUED] fluticasone propionate (FLONASE) 50 mcg/actuation nasal spray SHAKE LQ AND U 2 SPRAYS IEN QD      [DISCONTINUED] brompheniramine-pseudoeph-DM (BROMFED DM) 2-30-10 mg/5 mL Syrp Take 5 mLs by mouth every 4 to 6 hours as needed (cough, congestion). 480 mL 0    [DISCONTINUED] levocetirizine (XYZAL) 5 MG tablet Take 1 tablet (5 mg total) by mouth every evening. 30 tablet 11     Current Facility-Administered Medications on File Prior to Visit   Medication Dose Route Frequency Provider Last Rate Last Admin    cloNIDine tablet 0.1 mg  0.1 mg Oral 1 time in Clinic/HOD Mora Venegas, NP           Allergies   Review of patient's allergies indicates:  No Known Allergies    Review of Systems (Pertinent positives)  Review of Systems   Constitutional: Negative.  Negative for chills and fever.   HENT:  Positive for congestion, ear pain and hearing loss. Negative for ear discharge, nosebleeds, sinus pain, sore throat and tinnitus.    Eyes: Negative.    Respiratory:  Negative for cough, hemoptysis, sputum production and stridor.    Cardiovascular: Negative.    Gastrointestinal: Negative.    Genitourinary: Negative.    Skin: Negative.    Neurological: Negative.        /70 (BP Location: Left arm, Patient Position: Sitting, BP Method: Medium (Manual))   Pulse 81   Temp 98.2 °F (36.8 °C) (Oral)   Resp 15   Ht 5' 1" (1.549 m)   Wt 61.9 kg (136 lb 7.4 oz)   SpO2 99%   BMI 25.78 kg/m²     Physical Exam  Vitals reviewed.   Constitutional:       General: She is not in acute distress.     Appearance: Normal appearance. She is not ill-appearing, toxic-appearing or diaphoretic.   HENT:      Head: Normocephalic and atraumatic.      Right Ear: Ear canal and external ear normal. No laceration, drainage or swelling. A " middle ear effusion is present. Tympanic membrane is not injected, scarred, perforated, erythematous, retracted or bulging.      Left Ear: Ear canal and external ear normal. No laceration, drainage or swelling. A middle ear effusion is present. Tympanic membrane is not injected, scarred, perforated, erythematous, retracted or bulging.      Nose: No mucosal edema or rhinorrhea.   Pulmonary:      Effort: Pulmonary effort is normal. No respiratory distress.      Breath sounds: No wheezing.   Skin:     General: Skin is warm and dry.      Capillary Refill: Capillary refill takes less than 2 seconds.   Neurological:      Mental Status: She is alert and oriented to person, place, and time.   Psychiatric:         Mood and Affect: Mood normal.         Behavior: Behavior normal.          Assessment/Plan:  Irena Case is a 48 y.o. female who presents today for :    Irena was seen today for ear fullness.    Diagnoses and all orders for this visit:    Middle ear effusion, bilateral  -     fluticasone propionate (FLONASE) 50 mcg/actuation nasal spray; 2 sprays (100 mcg total) by Each Nostril route once daily.  -     azelastine (ASTELIN) 137 mcg (0.1 %) nasal spray; 1 spray (137 mcg total) by Nasal route 2 (two) times daily.  -     levocetirizine (XYZAL) 5 MG tablet; Take 1 tablet (5 mg total) by mouth every evening.  -     Ambulatory referral/consult to ENT; Future    No bacterial nidus on examination.  No need for further antibiotics or steroids.  Discussed with patient that middle ear effusions can take weeks to resolve.  Will maximize allergy treatment with nightly Xyzal, Astelin spray, and Flonase spray b.i.d..  Demonstrated with patient how to properly use the nasal sprays and I did provide her with a YouTube video on her my chart explaining this.  Recommended patient to stop using decongestants due to history of hypertension.  All questions answered.        This office note has been dictated.  This dictation has been  generated using M-Modal Fluency Direct dictation; some phonetic errors may occur.

## 2024-01-19 ENCOUNTER — TELEPHONE (OUTPATIENT)
Dept: ENDOSCOPY | Facility: HOSPITAL | Age: 49
End: 2024-01-19
Payer: OTHER GOVERNMENT

## 2024-01-22 ENCOUNTER — TELEPHONE (OUTPATIENT)
Dept: ENDOSCOPY | Facility: HOSPITAL | Age: 49
End: 2024-01-22
Payer: OTHER GOVERNMENT

## 2024-01-22 NOTE — TELEPHONE ENCOUNTER
Incoming call  Spoke to patient  Reason for the call: patient was returning nurse call to confirm procedure  Information confirmed:   Date of procedure:  1/26/24  Arrival time: 1200 pm  Procedure (s)colonoscopy  Prep: patient picked up prep  Ride: patient have  family member that will take her to her schedule procedure

## 2024-01-25 ENCOUNTER — PATIENT MESSAGE (OUTPATIENT)
Dept: FAMILY MEDICINE | Facility: CLINIC | Age: 49
End: 2024-01-25
Payer: OTHER GOVERNMENT

## 2024-01-25 ENCOUNTER — ANESTHESIA EVENT (OUTPATIENT)
Dept: ENDOSCOPY | Facility: HOSPITAL | Age: 49
End: 2024-01-25
Payer: OTHER GOVERNMENT

## 2024-01-26 ENCOUNTER — HOSPITAL ENCOUNTER (OUTPATIENT)
Facility: HOSPITAL | Age: 49
Discharge: HOME | End: 2024-01-26
Attending: INTERNAL MEDICINE | Admitting: INTERNAL MEDICINE
Payer: OTHER GOVERNMENT

## 2024-01-26 ENCOUNTER — ANESTHESIA (OUTPATIENT)
Dept: ENDOSCOPY | Facility: HOSPITAL | Age: 49
End: 2024-01-26
Payer: OTHER GOVERNMENT

## 2024-01-26 VITALS
HEART RATE: 64 BPM | SYSTOLIC BLOOD PRESSURE: 120 MMHG | OXYGEN SATURATION: 100 % | TEMPERATURE: 98 F | DIASTOLIC BLOOD PRESSURE: 85 MMHG | RESPIRATION RATE: 20 BRPM

## 2024-01-26 DIAGNOSIS — Z12.11 COLON CANCER SCREENING: ICD-10-CM

## 2024-01-26 PROCEDURE — 45380 COLONOSCOPY AND BIOPSY: CPT | Mod: PT,59 | Performed by: INTERNAL MEDICINE

## 2024-01-26 PROCEDURE — 00811 ANES LWR INTST NDSC NOS: CPT | Performed by: INTERNAL MEDICINE

## 2024-01-26 PROCEDURE — 37000009 HC ANESTHESIA EA ADD 15 MINS: Performed by: INTERNAL MEDICINE

## 2024-01-26 PROCEDURE — D9220A PRA ANESTHESIA: Mod: 33,ANES,, | Performed by: ANESTHESIOLOGY

## 2024-01-26 PROCEDURE — 45385 COLONOSCOPY W/LESION REMOVAL: CPT | Mod: PT | Performed by: INTERNAL MEDICINE

## 2024-01-26 PROCEDURE — 45385 COLONOSCOPY W/LESION REMOVAL: CPT | Mod: 33,,, | Performed by: INTERNAL MEDICINE

## 2024-01-26 PROCEDURE — 63600175 PHARM REV CODE 636 W HCPCS: Performed by: NURSE ANESTHETIST, CERTIFIED REGISTERED

## 2024-01-26 PROCEDURE — D9220A PRA ANESTHESIA: Mod: 33,CRNA,, | Performed by: NURSE ANESTHETIST, CERTIFIED REGISTERED

## 2024-01-26 PROCEDURE — 25000003 PHARM REV CODE 250: Performed by: ANESTHESIOLOGY

## 2024-01-26 PROCEDURE — 27201089 HC SNARE, DISP (ANY): Performed by: INTERNAL MEDICINE

## 2024-01-26 PROCEDURE — 27200997: Performed by: INTERNAL MEDICINE

## 2024-01-26 PROCEDURE — 27201012 HC FORCEPS, HOT/COLD, DISP: Performed by: INTERNAL MEDICINE

## 2024-01-26 PROCEDURE — 37000008 HC ANESTHESIA 1ST 15 MINUTES: Performed by: INTERNAL MEDICINE

## 2024-01-26 PROCEDURE — 88305 TISSUE EXAM BY PATHOLOGIST: CPT | Mod: 26,,, | Performed by: PATHOLOGY

## 2024-01-26 PROCEDURE — 88305 TISSUE EXAM BY PATHOLOGIST: CPT | Mod: 59 | Performed by: PATHOLOGY

## 2024-01-26 PROCEDURE — 25000003 PHARM REV CODE 250: Performed by: NURSE ANESTHETIST, CERTIFIED REGISTERED

## 2024-01-26 PROCEDURE — 45380 COLONOSCOPY AND BIOPSY: CPT | Mod: 33,59,, | Performed by: INTERNAL MEDICINE

## 2024-01-26 RX ORDER — LIDOCAINE HYDROCHLORIDE 10 MG/ML
1 INJECTION, SOLUTION EPIDURAL; INFILTRATION; INTRACAUDAL; PERINEURAL ONCE
Status: DISCONTINUED | OUTPATIENT
Start: 2024-01-26 | End: 2024-01-26 | Stop reason: HOSPADM

## 2024-01-26 RX ORDER — PROPOFOL 10 MG/ML
VIAL (ML) INTRAVENOUS
Status: DISCONTINUED | OUTPATIENT
Start: 2024-01-26 | End: 2024-01-26

## 2024-01-26 RX ORDER — LIDOCAINE HYDROCHLORIDE 20 MG/ML
INJECTION, SOLUTION EPIDURAL; INFILTRATION; INTRACAUDAL; PERINEURAL
Status: DISCONTINUED
Start: 2024-01-26 | End: 2024-01-26 | Stop reason: HOSPADM

## 2024-01-26 RX ORDER — LIDOCAINE HYDROCHLORIDE 20 MG/ML
INJECTION INTRAVENOUS
Status: DISCONTINUED | OUTPATIENT
Start: 2024-01-26 | End: 2024-01-26

## 2024-01-26 RX ORDER — PROPOFOL 10 MG/ML
INJECTION, EMULSION INTRAVENOUS
Status: DISCONTINUED
Start: 2024-01-26 | End: 2024-01-26 | Stop reason: HOSPADM

## 2024-01-26 RX ORDER — SODIUM CHLORIDE 9 MG/ML
INJECTION, SOLUTION INTRAVENOUS CONTINUOUS
Status: DISCONTINUED | OUTPATIENT
Start: 2024-01-26 | End: 2024-01-26 | Stop reason: HOSPADM

## 2024-01-26 RX ADMIN — PROPOFOL 50 MG: 10 INJECTION, EMULSION INTRAVENOUS at 01:01

## 2024-01-26 RX ADMIN — PROPOFOL 100 MG: 10 INJECTION, EMULSION INTRAVENOUS at 01:01

## 2024-01-26 RX ADMIN — PROPOFOL 80 MG: 10 INJECTION, EMULSION INTRAVENOUS at 01:01

## 2024-01-26 RX ADMIN — PROPOFOL 30 MG: 10 INJECTION, EMULSION INTRAVENOUS at 01:01

## 2024-01-26 RX ADMIN — PROPOFOL 40 MG: 10 INJECTION, EMULSION INTRAVENOUS at 01:01

## 2024-01-26 RX ADMIN — SODIUM CHLORIDE: 9 INJECTION, SOLUTION INTRAVENOUS at 12:01

## 2024-01-26 RX ADMIN — LIDOCAINE HYDROCHLORIDE 100 MG: 20 INJECTION, SOLUTION INTRAVENOUS at 01:01

## 2024-01-26 RX ADMIN — SODIUM CHLORIDE: 0.9 INJECTION, SOLUTION INTRAVENOUS at 01:01

## 2024-01-26 NOTE — ANESTHESIA POSTPROCEDURE EVALUATION
Anesthesia Post Evaluation    Patient: Irena Case    Procedure(s) Performed: Procedure(s) (LRB):  COLONOSCOPY (N/A)    Final Anesthesia Type: general      Patient location during evaluation: GI PACU  Patient participation: Yes- Able to Participate  Level of consciousness: awake and alert  Post-procedure vital signs: reviewed and stable  Airway patency: patent    PONV status at discharge: No PONV  Anesthetic complications: no      Cardiovascular status: blood pressure returned to baseline and hemodynamically stable  Respiratory status: unassisted, spontaneous ventilation and room air  Hydration status: euvolemic  Follow-up not needed.              Vitals Value Taken Time   /81 01/26/24 1402   Temp 36.9 °C (98.4 °F) 01/26/24 1347   Pulse 64 01/26/24 1402   Resp 15 01/26/24 1402   SpO2 98 % 01/26/24 1402         No case tracking events are documented in the log.      Pain/Jung Score: Jung Score: 10 (1/26/2024  2:02 PM)

## 2024-01-26 NOTE — H&P
Short Stay Endoscopy History and Physical    PCP - Sue Hansen MD    Procedure - Colonoscopy  ASA - per anesthesia  Mallampati - per anesthesia  History of Anesthesia problems - no  Family history Anesthesia problems - no   Plan of anesthesia - General    HPI:  This is a 48 y.o. female here for evaluation of : asymptomatic screening exam      ROS:  Constitutional: No fevers, chills, No weight loss  CV: No chest pain  Pulm: No cough, No shortness of breath  GI: see HPI  Derm: No rash    Medical History:  has a past medical history of Endometriosis, Fatty liver, and Hypertension.    Surgical History:  has a past surgical history that includes Total abdominal hysterectomy w/ bilateral salpingoophorectomy; LASIK (Bilateral); Hysterectomy; Breast biopsy (Left); Laparoscopic appendectomy (N/A, 04/13/2023); Appendectomy; and Eye surgery.    Family History: family history includes Arthritis in her mother; Asthma in her mother; COPD in her mother; Heart disease in her father; Heart failure in her mother; Hypertension in her mother.. Otherwise no colon cancer, inflammatory bowel disease, or GI malignancies.    Social History:  reports that she has been smoking vaping with nicotine. She has never used smokeless tobacco. She reports current alcohol use of about 2.0 standard drinks of alcohol per week. She reports that she does not use drugs.    Review of patient's allergies indicates:  No Known Allergies    Medications:   Facility-Administered Medications Prior to Admission   Medication Dose Route Frequency Provider Last Rate Last Admin    cloNIDine tablet 0.1 mg  0.1 mg Oral 1 time in Clinic/HOD Mora Venegas NP         Medications Prior to Admission   Medication Sig Dispense Refill Last Dose    azelastine (ASTELIN) 137 mcg (0.1 %) nasal spray 1 spray (137 mcg total) by Nasal route 2 (two) times daily. 30 mL 1     celecoxib (CELEBREX) 200 MG capsule Take 1 capsule (200 mg total) by mouth every 12 (twelve)  hours as needed for Pain. 60 capsule 0     EScitalopram oxalate (LEXAPRO) 20 MG tablet TAKE ONE TABLET BY MOUTH ONCE DAILY FOR MOOD Strength: 20 mg 90 tablet 0     fluticasone propionate (FLONASE) 50 mcg/actuation nasal spray 2 sprays (100 mcg total) by Each Nostril route once daily. 15.8 mL 1     hydroCHLOROthiazide (HYDRODIURIL) 25 MG tablet Take one tablet by mouth every day for fluid and blood pressure 90 tablet 0     levocetirizine (XYZAL) 5 MG tablet Take 1 tablet (5 mg total) by mouth every evening. 30 tablet 11     lisinopriL (PRINIVIL,ZESTRIL) 40 MG tablet Take 1 tablet (40 mg total) by mouth once daily. 90 tablet 0     methocarbamoL (ROBAXIN) 750 MG Tab Take 1 tablet (750 mg total) by mouth every 4 to 6 hours as needed (muscle spasm). 60 tablet 0     methylPREDNISolone (MEDROL DOSEPACK) 4 mg tablet follow package directions 21 tablet 0     propranoloL (INDERAL LA) 60 MG 24 hr capsule Take 1 capsule (60 mg total) by mouth once daily. 30 capsule 0          Physical Exam:    Vital Signs: There were no vitals filed for this visit.    Gen: NAD, lying comfortably  HENT: NCAT, oropharynx clear  Eyes: anicteric sclerae, EOMI grossly  Neck: supple, no visible masses/goiter  Cardiac: RRR  Lungs: non-labored breathing  Abd: soft, NT/ND, normoactive BS  Ext: no LE edema, warm, well perfused  Skin: skin intact on exposed body parts, no visible rashes, lesions  Neuro: A&Ox4, neuro exam grossly intact, moves all extremities  Psych: appropriate mood, affect        Labs:  Lab Results   Component Value Date    WBC 8.07 04/14/2023    HGB 10.5 (L) 04/14/2023    HCT 32.2 (L) 04/14/2023     04/14/2023    CHOL 193 09/09/2021    TRIG 53 09/09/2021    HDL 68 09/09/2021    ALT 20 04/12/2023    AST 21 04/12/2023     04/14/2023    K 4.5 04/14/2023     04/14/2023    CREATININE 0.6 04/14/2023    BUN 7 04/14/2023    CO2 24 04/14/2023    TSH 1.381 09/09/2021    INR 1.0 01/07/2022    HGBA1C 5.1 09/09/2021        Plan:  Colonoscopy for colon cancer screening.    I have explained the risks and benefits of endoscopy procedures to the patient including but not limited to bleeding, perforation, infection, and death.  The patient was asked if they understand and allowed to ask any further questions to their satisfaction.    Abi Mena MD

## 2024-01-26 NOTE — PROVATION PATIENT INSTRUCTIONS
Discharge Summary/Instructions after an Endoscopic Procedure  Patient Name: Irena Case  Patient MRN: [id number]  Patient YOB: 1975  Friday, January 26, 2024  Abi Mena MD  Dear patient,  As a result of recent federal legislation (The Federal Cures Act), you may   receive lab or pathology results from your procedure in your [facility]   account before your physician is able to contact you. Your physician or   their representative will relay the results to you with their   recommendations at their soonest availability.  Thank you,  RESTRICTIONS:  During your procedure today, you received medications for sedation.  These   medications may affect your judgment, balance and coordination.  Therefore,   for 24 hours, you have the following restrictions:   - DO NOT drive a car, operate machinery, make legal/financial decisions,   sign important papers or drink alcohol.    ACTIVITY:  Today: no heavy lifting, straining or running due to procedural   sedation/anesthesia.  The following day: return to full activity including work.  DIET:  Eat and drink normally unless instructed otherwise.     TREATMENT FOR COMMON SIDE EFFECTS:  - Mild abdominal pain, nausea, belching, bloating or excessive gas:  rest,   eat lightly and use a heating pad.  - Sore Throat: treat with throat lozenges and/or gargle with warm salt   water.  - Because air was used during the procedure, expelling large amounts of air   from your rectum or belching is normal.  - If a bowel prep was taken, you may not have a bowel movement for 1-3 days.    This is normal.  SYMPTOMS TO WATCH FOR AND REPORT TO YOUR PHYSICIAN:  1. Abdominal pain or bloating, other than gas cramps.  2. Chest pain.  3. Back pain.  4. Signs of infection such as: chills or fever occurring within 24 hours   after the procedure.  5. Rectal bleeding, which would show as bright red, maroon, or black stools.   (A tablespoon of blood from the rectum is not serious, especially  if   hemorrhoids are present.)  6. Vomiting.  7. Weakness or dizziness.  GO DIRECTLY TO THE NEAREST EMERGENCY ROOM IF YOU HAVE ANY OF THE FOLLOWING:      Difficulty breathing              Chills and/or fever over 101 F   Persistent vomiting and/or vomiting blood   Severe abdominal pain   Severe chest pain   Black, tarry stools   Bleeding- more than one tablespoon   Any other symptom or condition that you feel may need urgent attention  Your doctor recommends these additional instructions:  If any biopsies were taken, your doctors clinic will contact you in 1 to 2   weeks with any results.  - Discharge patient to home.   - Resume previous diet.   - Continue present medications.   - Await pathology results.   - Repeat colonoscopy in 7 years for surveillance.  For questions, problems or results please call your physician - Abi Mena MD at Work:  [phone].  [facility] can be reached at [phone]     IF A COMPLICATION OR EMERGENCY SITUATION ARISES AND YOU ARE UNABLE TO REACH   YOUR PHYSICIAN - GO DIRECTLY TO THE EMERGENCY ROOM.  Abi Mena MD  1/26/2024 1:51:22 PM  This report has been verified and signed electronically.  Dear patient,  As a result of recent federal legislation (The Federal Cures Act), you may   receive lab or pathology results from your procedure in your [facility]   account before your physician is able to contact you. Your physician or   their representative will relay the results to you with their   recommendations at their soonest availability.  Thank you,  PROVATION

## 2024-01-26 NOTE — ANESTHESIA PREPROCEDURE EVALUATION
01/26/2024  Irena Case is a 48 y.o., female.      Pre-op Assessment    I have reviewed the Patient Summary Reports.     I have reviewed the Nursing Notes. I have reviewed the NPO Status.   I have reviewed the Medications.     Review of Systems  Anesthesia Hx:  No problems with previous Anesthesia             Denies Family Hx of Anesthesia complications.    Denies Personal Hx of Anesthesia complications.                    Social:  Vaping, Social Alcohol Use       Hematology/Oncology:  Hematology Normal   Oncology Normal                                   EENT/Dental:  EENT/Dental Normal           Cardiovascular:     Hypertension Valvular problems/Murmurs, MVP                                       Pulmonary:  Pulmonary Normal                       Renal/:  Renal/ Normal                 Hepatic/GI:      Liver Disease,  Fatty Liver          Musculoskeletal:  Musculoskeletal Normal                Neurological:      Headaches                                 Endocrine:  Endocrine Normal            Psych:    depression                Physical Exam  General: Alert, Cooperative, Well nourished and Oriented    Airway:  Mallampati: III / II  Mouth Opening: Normal  TM Distance: Normal  Tongue: Normal  Neck ROM: Normal ROM    Dental:  Intact        Anesthesia Plan  Type of Anesthesia, risks & benefits discussed:    Anesthesia Type: Gen Natural Airway  Intra-op Monitoring Plan: Standard ASA Monitors  Induction:  IV  Informed Consent: Informed consent signed with the Patient and all parties understand the risks and agree with anesthesia plan.  All questions answered. Patient consented to blood products? No  ASA Score: 2    Ready For Surgery From Anesthesia Perspective.     .

## 2024-01-26 NOTE — TRANSFER OF CARE
Anesthesia Transfer of Care Note    Patient: Irena Case    Procedure(s) Performed: Procedure(s) (LRB):  COLONOSCOPY (N/A)    Patient location: GI    Anesthesia Type: general    Transport from OR: Transported from OR on room air with adequate spontaneous ventilation    Post pain: adequate analgesia    Post assessment: no apparent anesthetic complications and tolerated procedure well    Post vital signs: stable    Level of consciousness: lethargic and responds to stimulation    Nausea/Vomiting: no nausea/vomiting    Complications: none    Transfer of care protocol was followed      Last vitals: Visit Vitals  /69 (BP Location: Left forearm, Patient Position: Lying)   Pulse 71   Temp 36.9 °C (98.4 °F) (Oral)   Resp 14   SpO2 99%

## 2024-01-27 DIAGNOSIS — F33.1 MODERATE EPISODE OF RECURRENT MAJOR DEPRESSIVE DISORDER: ICD-10-CM

## 2024-01-27 NOTE — TELEPHONE ENCOUNTER
Care Due:                  Date            Visit Type   Department     Provider  --------------------------------------------------------------------------------                                SAME DAY -   [facility]   MEDICINE/INTERN  Last Visit: 04-      PATIENT      AL MED         Aparna Terrazas  Next Visit: None Scheduled  None         None Found                                                            Last  Test          Frequency    Reason                     Performed    Due Date  --------------------------------------------------------------------------------    CMP.........  12 months..  hydroCHLOROthiazide,       04- 04-                             lisinopriL...............    [employer] Embedded Care Due Messages. Reference number: [id number].   1/27/2024 12:33:44 AM CST

## 2024-01-29 RX ORDER — ESCITALOPRAM OXALATE 20 MG/1
TABLET ORAL
Qty: 90 TABLET | Refills: 0 | OUTPATIENT
Start: 2024-01-29

## 2024-01-30 LAB
FINAL PATHOLOGIC DIAGNOSIS: NORMAL
GROSS: NORMAL
Lab: NORMAL

## 2024-02-15 ENCOUNTER — OFFICE VISIT (OUTPATIENT)
Dept: FAMILY MEDICINE | Facility: CLINIC | Age: 49
End: 2024-02-15
Payer: OTHER GOVERNMENT

## 2024-02-15 VITALS
OXYGEN SATURATION: 99 % | RESPIRATION RATE: 16 BRPM | WEIGHT: 139.75 LBS | DIASTOLIC BLOOD PRESSURE: 70 MMHG | SYSTOLIC BLOOD PRESSURE: 130 MMHG | HEIGHT: 61 IN | HEART RATE: 74 BPM | BODY MASS INDEX: 26.39 KG/M2 | TEMPERATURE: 99 F

## 2024-02-15 DIAGNOSIS — F33.1 MODERATE EPISODE OF RECURRENT MAJOR DEPRESSIVE DISORDER: ICD-10-CM

## 2024-02-15 DIAGNOSIS — I10 PRIMARY HYPERTENSION: Primary | ICD-10-CM

## 2024-02-15 PROCEDURE — 99213 OFFICE O/P EST LOW 20 MIN: CPT | Mod: PBBFAC,PO | Performed by: NURSE PRACTITIONER

## 2024-02-15 PROCEDURE — 99999 PR PBB SHADOW E&M-EST. PATIENT-LVL III: CPT | Mod: PBBFAC,,, | Performed by: NURSE PRACTITIONER

## 2024-02-15 PROCEDURE — 99214 OFFICE O/P EST MOD 30 MIN: CPT | Mod: S$PBB,,, | Performed by: NURSE PRACTITIONER

## 2024-02-15 RX ORDER — LISINOPRIL 40 MG/1
40 TABLET ORAL DAILY
Qty: 90 TABLET | Refills: 0 | Status: SHIPPED | OUTPATIENT
Start: 2024-02-15 | End: 2024-04-17 | Stop reason: SDUPTHER

## 2024-02-15 RX ORDER — HYDROCHLOROTHIAZIDE 25 MG/1
TABLET ORAL
Qty: 90 TABLET | Refills: 0 | Status: SHIPPED | OUTPATIENT
Start: 2024-02-15 | End: 2024-04-17 | Stop reason: SDUPTHER

## 2024-02-15 RX ORDER — ESCITALOPRAM OXALATE 20 MG/1
TABLET ORAL
Qty: 90 TABLET | Refills: 0 | Status: SHIPPED | OUTPATIENT
Start: 2024-02-15 | End: 2024-04-17 | Stop reason: SDUPTHER

## 2024-02-17 NOTE — PLAN OF CARE
Lexapro 10 mg by mouth daily   Aripiprazole 5 mg by mouth daily (Bipolar)   Procedure and recovery complete. Awake and alert. No c/o pain or discomfort. Resp. Even and unlabored.  at bedside. Discharge instructions given. Verbalized understanding. No acute distress noted.

## 2024-02-20 PROBLEM — F98.8 ATTENTION DEFICIT DISORDER (ADD) IN ADULT: Status: ACTIVE | Noted: 2024-02-20

## 2024-02-20 NOTE — PROGRESS NOTES
"Subjective:      Patient ID: Irena Case is a 48 y.o. female.  Pt returns to clinic for medication refill. Has annual scheduled with PCP on 4/17/2024. States she is out of lexapro and reports worsening anxiety and mood. She continues to work as a  which has been a source of stress for her. Pt denies SI/HI nor plan to harm self or others. She is having increasing fatigue with decreasing ability to focus without medication.       Review of Systems   Constitutional:  Positive for fatigue. Negative for activity change, appetite change, fever and unexpected weight change.   HENT: Negative.     Eyes: Negative.    Respiratory:  Negative for chest tightness and shortness of breath.    Cardiovascular:  Negative for chest pain, palpitations and leg swelling.   Gastrointestinal:  Negative for abdominal pain, constipation, diarrhea, nausea and vomiting.   Endocrine: Negative.    Genitourinary:  Negative for difficulty urinating, dysuria and menstrual problem.   Musculoskeletal: Negative.    Integumentary:  Negative for rash.   Allergic/Immunologic: Negative.    Neurological:  Negative for dizziness, vertigo, tremors, seizures, syncope, facial asymmetry, speech difficulty, weakness, light-headedness, numbness, headaches, memory loss and coordination difficulties.   Hematological: Negative.    Psychiatric/Behavioral:  Positive for agitation and decreased concentration. Negative for behavioral problems, confusion, dysphoric mood, hallucinations, self-injury, sleep disturbance and suicidal ideas. The patient is nervous/anxious. The patient is not hyperactive.    All other systems reviewed and are negative.        Objective:     Vitals:    02/15/24 1337   BP: 130/70   Pulse: 74   Resp: 16   Temp: 98.7 °F (37.1 °C)   TempSrc: Oral   SpO2: 99%   Weight: 63.4 kg (139 lb 12.4 oz)   Height: 5' 1" (1.549 m)     Physical Exam  Vitals and nursing note reviewed.   Constitutional:       General: She is not in acute " distress.     Appearance: Normal appearance. She is well-developed and well-groomed. She is not ill-appearing.   HENT:      Head: Normocephalic and atraumatic.      Right Ear: External ear normal.      Left Ear: External ear normal.      Nose: Nose normal.      Mouth/Throat:      Lips: Pink.      Mouth: Mucous membranes are moist.   Eyes:      General: Lids are normal. Vision grossly intact. Gaze aligned appropriately.      Conjunctiva/sclera: Conjunctivae normal.      Pupils: Pupils are equal, round, and reactive to light.   Neck:      Trachea: Phonation normal.   Cardiovascular:      Rate and Rhythm: Normal rate and regular rhythm.      Heart sounds: Normal heart sounds.   Pulmonary:      Effort: Pulmonary effort is normal. No accessory muscle usage or respiratory distress.      Breath sounds: Normal breath sounds and air entry.   Abdominal:      General: Abdomen is flat. Bowel sounds are normal. There is no distension.      Palpations: Abdomen is soft.      Tenderness: There is no abdominal tenderness.   Musculoskeletal:      Cervical back: Neck supple.      Right lower leg: No edema.      Left lower leg: No edema.   Skin:     General: Skin is warm and dry.      Findings: No rash.   Neurological:      General: No focal deficit present.      Mental Status: She is alert and oriented to person, place, and time. Mental status is at baseline.      Sensory: Sensation is intact.      Motor: Motor function is intact.      Coordination: Coordination is intact.      Gait: Gait is intact.   Psychiatric:         Attention and Perception: Attention and perception normal.         Mood and Affect: Mood and affect normal. Affect is not inappropriate.         Speech: Speech normal.         Behavior: Behavior normal. Behavior is cooperative.         Thought Content: Thought content normal. Thought content does not include homicidal or suicidal ideation. Thought content does not include homicidal or suicidal plan.         Cognition  and Memory: Cognition and memory normal.         Judgment: Judgment normal.       Assessment and Plan:     1. Primary hypertension  Controlled on current regimen  Medication: no change.  Dietary sodium restriction.  Regular aerobic exercise.  Follow up: as needed.  - lisinopriL (PRINIVIL,ZESTRIL) 40 MG tablet; Take 1 tablet (40 mg total) by mouth once daily.  Dispense: 90 tablet; Refill: 0  - hydroCHLOROthiazide (HYDRODIURIL) 25 MG tablet; Take one tablet by mouth every day for fluid and blood pressure  Dispense: 90 tablet; Refill: 0    2. Moderate episode of recurrent major depressive disorder  Medications: resume daily SSRI.  Reviewed concept of anxiety as biochemical imbalance of neurotransmitters and rationale for treatment.  Instructed patient to contact office or on-call physician promptly should condition worsen or any new symptoms appear and provided on-call telephone numbers. IF THE PATIENT HAS ANY SUICIDAL OR HOMICIDAL IDEATIONS, CALL THE OFFICE, DISCUSS WITH A SUPPORT MEMBER, OR GO TO THE ER IMMEDIATELY. Patient was agreeable with this plan.  Follow up: 2 months.  - EScitalopram oxalate (LEXAPRO) 20 MG tablet; TAKE ONE TABLET BY MOUTH ONCE DAILY FOR MOOD Strength: 20 mg  Dispense: 90 tablet; Refill: 0          JULIA Rodriguez, FNP-C  Family/Internal Medicine  [facility]

## 2024-04-09 ENCOUNTER — OFFICE VISIT (OUTPATIENT)
Dept: URGENT CARE | Facility: CLINIC | Age: 49
End: 2024-04-09
Payer: OTHER GOVERNMENT

## 2024-04-09 VITALS
DIASTOLIC BLOOD PRESSURE: 92 MMHG | BODY MASS INDEX: 26.24 KG/M2 | OXYGEN SATURATION: 99 % | TEMPERATURE: 99 F | WEIGHT: 139 LBS | HEIGHT: 61 IN | HEART RATE: 91 BPM | RESPIRATION RATE: 16 BRPM | SYSTOLIC BLOOD PRESSURE: 145 MMHG

## 2024-04-09 DIAGNOSIS — R05.9 COUGH, UNSPECIFIED TYPE: ICD-10-CM

## 2024-04-09 DIAGNOSIS — J10.1 INFLUENZA A: Primary | ICD-10-CM

## 2024-04-09 LAB
CTP QC/QA: YES
POC MOLECULAR INFLUENZA A AGN: POSITIVE
POC MOLECULAR INFLUENZA B AGN: NEGATIVE

## 2024-04-09 PROCEDURE — 99214 OFFICE O/P EST MOD 30 MIN: CPT | Mod: S$GLB,,,

## 2024-04-09 PROCEDURE — 87502 INFLUENZA DNA AMP PROBE: CPT | Mod: QW,S$GLB,,

## 2024-04-09 RX ORDER — PROMETHAZINE HYDROCHLORIDE AND DEXTROMETHORPHAN HYDROBROMIDE 6.25; 15 MG/5ML; MG/5ML
5 SYRUP ORAL EVERY 4 HOURS PRN
Qty: 118 ML | Refills: 0 | Status: SHIPPED | OUTPATIENT
Start: 2024-04-09 | End: 2024-04-17

## 2024-04-09 RX ORDER — OSELTAMIVIR PHOSPHATE 75 MG/1
75 CAPSULE ORAL 2 TIMES DAILY
Qty: 10 CAPSULE | Refills: 0 | Status: SHIPPED | OUTPATIENT
Start: 2024-04-09 | End: 2024-04-17 | Stop reason: ALTCHOICE

## 2024-04-09 NOTE — PATIENT INSTRUCTIONS
Your illness is likely caused by a virus and antibiotics would not be beneficial at this time.    Please drink plenty of fluids and get plenty of rest.    Take over the counter Sudafed, Mucinex-D, Allegra-D, Claritin-D, or Zyrtec-D for symptoms.     May gargle salt water for sore throat, a tablespoon of honey is helpful for cough, especially at night.     If not allergic, please take over the counter Tylenol (Acetaminophen) and/or Motrin (Ibuprofen) as directed for control of pain and/or fever.    Please follow up with your primary care doctor or specialist as needed.    If you smoke, please stop smoking.

## 2024-04-09 NOTE — PROGRESS NOTES
"Subjective:      Patient ID: Irena Case is a 49 y.o. female.    Vitals:  height is 5' 1" (1.549 m) and weight is 63 kg (139 lb). Her oral temperature is 98.7 °F (37.1 °C). Her blood pressure is 145/92 (abnormal) and her pulse is 91. Her respiration is 16 and oxygen saturation is 99%.     Chief Complaint: Sinus Problem    Pt present today for sinus congestion and cough that started 3 days ago. Pt took mucinex, tylenol and sudafed. Pt took a home Covid yesterday and the result was negative.  Denies any fever, but has body aches.      Sinus Problem  This is a new problem. The current episode started in the past 7 days. The problem is unchanged. There has been no fever. She is experiencing no pain. Associated symptoms include chills, congestion, coughing and sinus pressure. Pertinent negatives include no ear pain, headaches, neck pain, shortness of breath or sore throat. (Fatigue and bodyaches) Past treatments include acetaminophen. The treatment provided no relief.       Constitution: Positive for chills. Negative for fever and generalized weakness.   HENT:  Positive for congestion and sinus pressure. Negative for ear pain, sinus pain and sore throat.    Neck: Negative for neck pain.   Cardiovascular:  Negative for chest pain.   Respiratory:  Positive for cough. Negative for shortness of breath.    Gastrointestinal:  Negative for abdominal pain.   Musculoskeletal:  Positive for muscle ache (body ache).   Neurological:  Negative for headaches.      Objective:     Physical Exam   Constitutional: She is oriented to person, place, and time. She appears well-developed. She is cooperative.  Non-toxic appearance. She does not appear ill. No distress.   HENT:   Head: Normocephalic and atraumatic.   Ears:   Right Ear: Hearing, tympanic membrane, external ear and ear canal normal.   Left Ear: Hearing, tympanic membrane, external ear and ear canal normal.   Nose: Nose normal. No mucosal edema, rhinorrhea or nasal deformity. No " epistaxis. Right sinus exhibits no maxillary sinus tenderness and no frontal sinus tenderness. Left sinus exhibits no maxillary sinus tenderness and no frontal sinus tenderness.   Mouth/Throat: Uvula is midline, oropharynx is clear and moist and mucous membranes are normal. No trismus in the jaw. Normal dentition. No uvula swelling. No oropharyngeal exudate, posterior oropharyngeal edema or posterior oropharyngeal erythema.   Eyes: Conjunctivae and lids are normal. No scleral icterus.   Neck: Trachea normal and phonation normal. Neck supple. No edema present. No erythema present. No neck rigidity present.   Cardiovascular: Normal rate, regular rhythm, normal heart sounds and normal pulses.   Pulmonary/Chest: Effort normal and breath sounds normal. No respiratory distress. She has no decreased breath sounds. She has no rhonchi.   Breath sounds clear bilaterally         Comments: Breath sounds clear bilaterally    Abdominal: Normal appearance.   Musculoskeletal: Normal range of motion.         General: No deformity. Normal range of motion.   Neurological: She is alert and oriented to person, place, and time. She exhibits normal muscle tone. Coordination normal.   Skin: Skin is warm, dry, intact, not diaphoretic and not pale.   Psychiatric: Her speech is normal and behavior is normal. Judgment and thought content normal.   Nursing note and vitals reviewed.      Assessment:     1. Influenza A    2. Cough, unspecified type        Plan:   Physical exam as documented above, no clinical evidence of respiratory failure, dehydration, or focal/systemic bacterial infection at this time. Influenza test positive in clinic today, discussed risks and benefits of Tamiflu with patient and she would like a prescription to take at this time. Anticipatory guidance regarding Influenza discussed with patient.  Discussed use of over-the-counter medications, such as Tylenol, Motrin, Sudafed and Mucinez-D, for symptoms as well as supportive  care and return precautions. Patient verbalizes understanding and agrees to follow-up with PCP as needed.     Results for orders placed or performed in visit on 04/09/24   POCT Influenza A/B MOLECULAR   Result Value Ref Range    POC Molecular Influenza A Ag Positive (A) Negative, Not Reported    POC Molecular Influenza B Ag Negative Negative, Not Reported     Acceptable Yes          Influenza A  -     oseltamivir (TAMIFLU) 75 MG capsule; Take 1 capsule (75 mg total) by mouth 2 (two) times daily. for 5 days  Dispense: 10 capsule; Refill: 0  -     promethazine-dextromethorphan (PROMETHAZINE-DM) 6.25-15 mg/5 mL Syrp; Take 5 mLs by mouth every 4 (four) hours as needed (cough).  Dispense: 118 mL; Refill: 0    Cough, unspecified type  -     POCT Influenza A/B MOLECULAR  -     promethazine-dextromethorphan (PROMETHAZINE-DM) 6.25-15 mg/5 mL Syrp; Take 5 mLs by mouth every 4 (four) hours as needed (cough).  Dispense: 118 mL; Refill: 0      Patient Instructions   Your illness is likely caused by a virus and antibiotics would not be beneficial at this time.    Please drink plenty of fluids and get plenty of rest.    Take over the counter Sudafed, Mucinex-D, Allegra-D, Claritin-D, or Zyrtec-D for symptoms.     May gargle salt water for sore throat, a tablespoon of honey is helpful for cough, especially at night.     If not allergic, please take over the counter Tylenol (Acetaminophen) and/or Motrin (Ibuprofen) as directed for control of pain and/or fever.    Please follow up with your primary care doctor or specialist as needed.    If you smoke, please stop smoking.

## 2024-04-17 ENCOUNTER — LAB VISIT (OUTPATIENT)
Dept: LAB | Facility: HOSPITAL | Age: 49
End: 2024-04-17
Attending: INTERNAL MEDICINE
Payer: OTHER GOVERNMENT

## 2024-04-17 ENCOUNTER — OFFICE VISIT (OUTPATIENT)
Dept: FAMILY MEDICINE | Facility: CLINIC | Age: 49
End: 2024-04-17
Payer: OTHER GOVERNMENT

## 2024-04-17 VITALS
OXYGEN SATURATION: 99 % | HEIGHT: 61 IN | BODY MASS INDEX: 25.81 KG/M2 | HEART RATE: 89 BPM | SYSTOLIC BLOOD PRESSURE: 118 MMHG | DIASTOLIC BLOOD PRESSURE: 86 MMHG | TEMPERATURE: 98 F | WEIGHT: 136.69 LBS

## 2024-04-17 DIAGNOSIS — F33.1 MODERATE EPISODE OF RECURRENT MAJOR DEPRESSIVE DISORDER: ICD-10-CM

## 2024-04-17 DIAGNOSIS — Z12.4 PAP SMEAR FOR CERVICAL CANCER SCREENING: ICD-10-CM

## 2024-04-17 DIAGNOSIS — Z00.00 ANNUAL PHYSICAL EXAM: ICD-10-CM

## 2024-04-17 DIAGNOSIS — I10 PRIMARY HYPERTENSION: ICD-10-CM

## 2024-04-17 DIAGNOSIS — Z00.00 ANNUAL PHYSICAL EXAM: Primary | ICD-10-CM

## 2024-04-17 DIAGNOSIS — Z12.31 ENCOUNTER FOR SCREENING MAMMOGRAM FOR BREAST CANCER: ICD-10-CM

## 2024-04-17 LAB
ALBUMIN SERPL BCP-MCNC: 4.4 G/DL (ref 3.5–5.2)
ALP SERPL-CCNC: 61 U/L (ref 55–135)
ALT SERPL W/O P-5'-P-CCNC: 29 U/L (ref 10–44)
ANION GAP SERPL CALC-SCNC: 10 MMOL/L (ref 8–16)
AST SERPL-CCNC: 25 U/L (ref 10–40)
BASOPHILS # BLD AUTO: 0.05 K/UL (ref 0–0.2)
BASOPHILS NFR BLD: 0.9 % (ref 0–1.9)
BILIRUB SERPL-MCNC: 0.5 MG/DL (ref 0.1–1)
BUN SERPL-MCNC: 12 MG/DL (ref 6–20)
CALCIUM SERPL-MCNC: 9.9 MG/DL (ref 8.7–10.5)
CHLORIDE SERPL-SCNC: 100 MMOL/L (ref 95–110)
CHOLEST SERPL-MCNC: 192 MG/DL (ref 120–199)
CHOLEST/HDLC SERPL: 3.4 {RATIO} (ref 2–5)
CO2 SERPL-SCNC: 25 MMOL/L (ref 23–29)
CREAT SERPL-MCNC: 0.7 MG/DL (ref 0.5–1.4)
DIFFERENTIAL METHOD BLD: ABNORMAL
EOSINOPHIL # BLD AUTO: 0.2 K/UL (ref 0–0.5)
EOSINOPHIL NFR BLD: 4 % (ref 0–8)
ERYTHROCYTE [DISTWIDTH] IN BLOOD BY AUTOMATED COUNT: 12.1 % (ref 11.5–14.5)
EST. GFR  (NO RACE VARIABLE): >60 ML/MIN/1.73 M^2
ESTIMATED AVG GLUCOSE: 108 MG/DL (ref 68–131)
GLUCOSE SERPL-MCNC: 96 MG/DL (ref 70–110)
HBA1C MFR BLD: 5.4 % (ref 4–5.6)
HCT VFR BLD AUTO: 41.8 % (ref 37–48.5)
HDLC SERPL-MCNC: 56 MG/DL (ref 40–75)
HDLC SERPL: 29.2 % (ref 20–50)
HGB BLD-MCNC: 13.4 G/DL (ref 12–16)
IMM GRANULOCYTES # BLD AUTO: 0.05 K/UL (ref 0–0.04)
IMM GRANULOCYTES NFR BLD AUTO: 0.9 % (ref 0–0.5)
LDLC SERPL CALC-MCNC: 112.2 MG/DL (ref 63–159)
LYMPHOCYTES # BLD AUTO: 1.9 K/UL (ref 1–4.8)
LYMPHOCYTES NFR BLD: 34.6 % (ref 18–48)
MCH RBC QN AUTO: 29.7 PG (ref 27–31)
MCHC RBC AUTO-ENTMCNC: 32.1 G/DL (ref 32–36)
MCV RBC AUTO: 93 FL (ref 82–98)
MONOCYTES # BLD AUTO: 0.7 K/UL (ref 0.3–1)
MONOCYTES NFR BLD: 13.4 % (ref 4–15)
NEUTROPHILS # BLD AUTO: 2.6 K/UL (ref 1.8–7.7)
NEUTROPHILS NFR BLD: 46.2 % (ref 38–73)
NONHDLC SERPL-MCNC: 136 MG/DL
NRBC BLD-RTO: 0 /100 WBC
PLATELET # BLD AUTO: 325 K/UL (ref 150–450)
PMV BLD AUTO: 10 FL (ref 9.2–12.9)
POTASSIUM SERPL-SCNC: 4.5 MMOL/L (ref 3.5–5.1)
PROT SERPL-MCNC: 7.6 G/DL (ref 6–8.4)
RBC # BLD AUTO: 4.51 M/UL (ref 4–5.4)
SODIUM SERPL-SCNC: 135 MMOL/L (ref 136–145)
TRIGL SERPL-MCNC: 119 MG/DL (ref 30–150)
TSH SERPL DL<=0.005 MIU/L-ACNC: 1.15 UIU/ML (ref 0.4–4)
WBC # BLD AUTO: 5.52 K/UL (ref 3.9–12.7)

## 2024-04-17 PROCEDURE — 80061 LIPID PANEL: CPT | Performed by: INTERNAL MEDICINE

## 2024-04-17 PROCEDURE — 83036 HEMOGLOBIN GLYCOSYLATED A1C: CPT | Performed by: INTERNAL MEDICINE

## 2024-04-17 PROCEDURE — 85025 COMPLETE CBC W/AUTO DIFF WBC: CPT | Performed by: INTERNAL MEDICINE

## 2024-04-17 PROCEDURE — 36415 COLL VENOUS BLD VENIPUNCTURE: CPT | Mod: PO | Performed by: INTERNAL MEDICINE

## 2024-04-17 PROCEDURE — 99999 PR PBB SHADOW E&M-EST. PATIENT-LVL IV: CPT | Mod: PBBFAC,,, | Performed by: INTERNAL MEDICINE

## 2024-04-17 PROCEDURE — 99396 PREV VISIT EST AGE 40-64: CPT | Mod: S$PBB,,, | Performed by: INTERNAL MEDICINE

## 2024-04-17 PROCEDURE — 99214 OFFICE O/P EST MOD 30 MIN: CPT | Mod: PBBFAC,PO | Performed by: INTERNAL MEDICINE

## 2024-04-17 PROCEDURE — 80053 COMPREHEN METABOLIC PANEL: CPT | Performed by: INTERNAL MEDICINE

## 2024-04-17 PROCEDURE — 84443 ASSAY THYROID STIM HORMONE: CPT | Performed by: INTERNAL MEDICINE

## 2024-04-17 RX ORDER — HYDROCHLOROTHIAZIDE 25 MG/1
TABLET ORAL
Qty: 90 TABLET | Refills: 3 | Status: SHIPPED | OUTPATIENT
Start: 2024-04-17

## 2024-04-17 RX ORDER — LISINOPRIL 40 MG/1
40 TABLET ORAL DAILY
Qty: 90 TABLET | Refills: 3 | Status: SHIPPED | OUTPATIENT
Start: 2024-04-17

## 2024-04-17 RX ORDER — ESCITALOPRAM OXALATE 20 MG/1
TABLET ORAL
Qty: 90 TABLET | Refills: 3 | Status: SHIPPED | OUTPATIENT
Start: 2024-04-17

## 2024-04-17 NOTE — PROGRESS NOTES
SUBJECTIVE     Chief Complaint   Patient presents with    Annual Exam       HPI  Irena Case is a 49 y.o. female with multiple medical diagnoses as listed in the medical history and problem list that presents for annual exam. Pt has been doing okay since her last visit, but has had some L hip pain and the flu recently. She has a good appetite and eats well. She does exercise sometimes. She sleeps for ~5-6 hours nightly. Pt does take OTC supplements, which is a MVI. She does not have any current stressors. Pt is UTD on age appropriate CA screening.    PAST MEDICAL HISTORY:  Past Medical History:   Diagnosis Date    Endometriosis     Fatty liver     Hypertension        PAST SURGICAL HISTORY:  Past Surgical History:   Procedure Laterality Date    APPENDECTOMY      BREAST BIOPSY Left     benign    COLONOSCOPY N/A 1/26/2024    Procedure: COLONOSCOPY;  Surgeon: Abi Mena MD;  Location: [facility] ENDO;  Service: Endoscopy;  Laterality: N/A;  10/9 ref by Sue Hansen MD, PEG, instr. to portal-st  1/19- lvm and portal msg for pc. DBM    EYE SURGERY      HYSTERECTOMY      LAPAROSCOPIC APPENDECTOMY N/A 04/13/2023    Procedure: APPENDECTOMY, LAPAROSCOPIC;  Surgeon: Niall Benjamin MD;  Location: [facility] OR;  Service: General;  Laterality: N/A;    LASIK Bilateral     TOTAL ABDOMINAL HYSTERECTOMY W/ BILATERAL SALPINGOOPHORECTOMY      Pt still has her cervix in place       SOCIAL HISTORY:  Social History     Socioeconomic History    Marital status:    Tobacco Use    Smoking status: Every Day     Types: Vaping with nicotine    Smokeless tobacco: Never   Substance and Sexual Activity    Alcohol use: Yes     Alcohol/week: 2.0 standard drinks of alcohol     Types: 2 Glasses of wine per week     Comment: daily    Drug use: Never   Social History Narrative    ** Merged History Encounter **          Social Determinants of Health     Financial Resource Strain: Low Risk  (1/11/2024)    Overall Financial Resource Strain (CARDIA)      Difficulty of Paying Living Expenses: Not hard at all   Food Insecurity: No Food Insecurity (1/11/2024)    Hunger Vital Sign     Worried About Running Out of Food in the Last Year: Never true     Ran Out of Food in the Last Year: Never true   Transportation Needs: No Transportation Needs (1/11/2024)    PRAPARE - Transportation     Lack of Transportation (Medical): No     Lack of Transportation (Non-Medical): No   Physical Activity: Inactive (1/11/2024)    Exercise Vital Sign     Days of Exercise per Week: 0 days     Minutes of Exercise per Session: 0 min   Stress: Stress Concern Present (1/11/2024)    [employer] [place] [employer] - Occupational Stress Questionnaire     Feeling of Stress : To some extent   Social Connections: Unknown (1/11/2024)    Social Connection and Isolation Panel [NHANES]     Frequency of Communication with Friends and Family: More than three times a week     Frequency of Social Gatherings with Friends and Family: Once a week     Active Member of Clubs or Organizations: Patient declined     Attends Club or Organization Meetings: Patient declined     Marital Status:    Housing Stability: Low Risk  (1/11/2024)    Housing Stability Vital Sign     Unable to Pay for Housing in the Last Year: No     Number of Places Lived in the Last Year: 1     Unstable Housing in the Last Year: No       FAMILY HISTORY:  Family History   Problem Relation Name Age of Onset    COPD Mother Kaitlynn     Asthma Mother Kaitlynn     Hypertension Mother Kaitlynn     Heart failure Mother Kaitlynn     Arthritis Mother Kaitlynn     Heart disease Father Luis         s/p quadruple bypass    Cirrhosis Neg Hx         ALLERGIES AND MEDICATIONS: updated and reviewed.  Review of patient's allergies indicates:  No Known Allergies  Current Outpatient Medications   Medication Sig Dispense Refill    azelastine (ASTELIN) 137 mcg (0.1 %) nasal spray 1 spray (137 mcg total) by Nasal route 2 (two) times daily. 30 mL 1    fluticasone  propionate (FLONASE) 50 mcg/actuation nasal spray 2 sprays (100 mcg total) by Each Nostril route once daily. 15.8 mL 1    levocetirizine (XYZAL) 5 MG tablet Take 1 tablet (5 mg total) by mouth every evening. 30 tablet 11    MULTIVITAMIN ORAL Take by mouth.      celecoxib (CELEBREX) 200 MG capsule Take 1 capsule (200 mg total) by mouth every 12 (twelve) hours as needed for Pain. (Patient not taking: Reported on 4/9/2024) 60 capsule 0    EScitalopram oxalate (LEXAPRO) 20 MG tablet TAKE ONE TABLET BY MOUTH ONCE DAILY FOR MOOD Strength: 20 mg 90 tablet 3    hydroCHLOROthiazide (HYDRODIURIL) 25 MG tablet Take one tablet by mouth every day for fluid and blood pressure 90 tablet 3    lisinopriL (PRINIVIL,ZESTRIL) 40 MG tablet Take 1 tablet (40 mg total) by mouth once daily. 90 tablet 3    methocarbamoL (ROBAXIN) 750 MG Tab Take 1 tablet (750 mg total) by mouth every 4 to 6 hours as needed (muscle spasm). (Patient not taking: Reported on 4/9/2024) 60 tablet 0     No current facility-administered medications for this visit.       ROS  Review of Systems   Constitutional:  Negative for chills and fever.   HENT:  Positive for sore throat. Negative for hearing loss.    Eyes:  Negative for visual disturbance.   Respiratory:  Positive for cough and shortness of breath.    Cardiovascular:  Negative for chest pain, palpitations and leg swelling.   Gastrointestinal:  Positive for abdominal pain (epigastric). Negative for constipation, diarrhea, nausea and vomiting.   Genitourinary:  Negative for dysuria, frequency and urgency.   Musculoskeletal:  Negative for arthralgias, joint swelling and myalgias.   Skin:  Negative for rash and wound.   Neurological:  Positive for headaches.   Psychiatric/Behavioral:  Negative for agitation and confusion. The patient is nervous/anxious.          OBJECTIVE     Physical Exam  Vitals:    04/17/24 0801   BP: 118/86   Pulse: 89   Temp: 98.3 °F (36.8 °C)    Body mass index is 25.83 kg/m².  Weight: 62 kg  "(136 lb 11 oz)   Height: 5' 1" (154.9 cm)     Physical Exam  Constitutional:       General: She is not in acute distress.     Appearance: She is well-developed.   HENT:      Head: Normocephalic and atraumatic.      Right Ear: Ear canal and external ear normal. A middle ear effusion is present.      Left Ear: Tympanic membrane, ear canal and external ear normal.      Nose: Nose normal.   Eyes:      General: No scleral icterus.        Right eye: No discharge.         Left eye: No discharge.      Conjunctiva/sclera: Conjunctivae normal.   Neck:      Vascular: No JVD.      Trachea: No tracheal deviation.   Cardiovascular:      Rate and Rhythm: Normal rate and regular rhythm.      Heart sounds: No murmur heard.     No friction rub. No gallop.   Pulmonary:      Effort: Pulmonary effort is normal. No respiratory distress.      Breath sounds: Normal breath sounds. No wheezing.   Abdominal:      General: Bowel sounds are normal. There is no distension.      Palpations: Abdomen is soft. There is no mass.      Tenderness: There is no abdominal tenderness. There is no guarding or rebound.   Musculoskeletal:         General: No tenderness or deformity. Normal range of motion.      Cervical back: Normal range of motion and neck supple.   Skin:     General: Skin is warm and dry.      Findings: No erythema or rash.   Neurological:      Mental Status: She is alert and oriented to person, place, and time.      Motor: No abnormal muscle tone.      Coordination: Coordination normal.   Psychiatric:         Behavior: Behavior normal.         Thought Content: Thought content normal.         Judgment: Judgment normal.           Health Maintenance         Date Due Completion Date    Pneumococcal Vaccines (Age 0-64) (1 of 2 - PCV) Never done ---    Lipid Panel 09/09/2022 9/9/2021    Shingles Vaccine (1 of 2) Never done ---    Influenza Vaccine (1) 09/01/2023 1/21/2010    COVID-19 Vaccine (5 - 2023-24 season) 09/01/2023 5/17/2022    Mammogram " 07/07/2024 7/7/2023    Hemoglobin A1c (Diabetic Prevention Screening) 09/09/2024 9/9/2021    Colorectal Cancer Screening 01/26/2031 1/26/2024    TETANUS VACCINE 12/29/2033 12/29/2023              ASSESSMENT     49 y.o. female with     1. Annual physical exam    2. Primary hypertension    3. Moderate episode of recurrent major depressive disorder    4. Encounter for screening mammogram for breast cancer    5. Pap smear for cervical cancer screening        PLAN:     1. Annual physical exam  - Counseled on age appropriate medical preventative services, including age appropriate cancer screenings, over all nutritional health, need for a consistent exercise regimen and an over all push towards maintaining a vigorous and active lifestyle.  Counseled on age appropriate vaccines and discussed upcoming health care needs based on age/gender.  Spent time with patient counseling on need for a good patient/doctor relationship moving forward.  Discussed use of common OTC medications and supplements.  Discussed common dietary aids and use of caffeine and the need for good sleep hygiene and stress management.  - CBC Auto Differential; Future  - Comprehensive Metabolic Panel; Future  - Hemoglobin A1C; Future  - TSH; Future  - Lipid Panel; Future    2. Primary hypertension  - BP well controlled; at goal of <140/90  - The current medical regimen is effective;  continue present plan and medications.  - lisinopriL (PRINIVIL,ZESTRIL) 40 MG tablet; Take 1 tablet (40 mg total) by mouth once daily.  Dispense: 90 tablet; Refill: 3  - hydroCHLOROthiazide (HYDRODIURIL) 25 MG tablet; Take one tablet by mouth every day for fluid and blood pressure  Dispense: 90 tablet; Refill: 3    3. Moderate episode of recurrent major depressive disorder  - Stable; no acute issues  - The current medical regimen is effective;  continue present plan and medications.  - EScitalopram oxalate (LEXAPRO) 20 MG tablet; TAKE ONE TABLET BY MOUTH ONCE DAILY FOR MOOD  Strength: 20 mg  Dispense: 90 tablet; Refill: 3    4. Encounter for screening mammogram for breast cancer  - Mammo Digital Screening Bilat; Future    5. Pap smear for cervical cancer screening  - Ambulatory referral/consult to Obstetrics / Gynecology; Future        RTC in 1 year     Sue Hansen MD  04/17/2024 8:21 AM        No follow-ups on file.

## 2024-06-10 ENCOUNTER — PATIENT MESSAGE (OUTPATIENT)
Dept: FAMILY MEDICINE | Facility: CLINIC | Age: 49
End: 2024-06-10
Payer: OTHER GOVERNMENT

## 2024-06-10 ENCOUNTER — PATIENT MESSAGE (OUTPATIENT)
Dept: OBSTETRICS AND GYNECOLOGY | Facility: CLINIC | Age: 49
End: 2024-06-10

## 2024-06-10 ENCOUNTER — OFFICE VISIT (OUTPATIENT)
Dept: OBSTETRICS AND GYNECOLOGY | Facility: CLINIC | Age: 49
End: 2024-06-10
Payer: OTHER GOVERNMENT

## 2024-06-10 VITALS
WEIGHT: 137.81 LBS | SYSTOLIC BLOOD PRESSURE: 126 MMHG | DIASTOLIC BLOOD PRESSURE: 86 MMHG | BODY MASS INDEX: 26.03 KG/M2

## 2024-06-10 DIAGNOSIS — Z12.4 PAP SMEAR FOR CERVICAL CANCER SCREENING: Primary | ICD-10-CM

## 2024-06-10 DIAGNOSIS — N95.2 VAGINAL ATROPHY: Primary | ICD-10-CM

## 2024-06-10 DIAGNOSIS — N95.2 VAGINAL ATROPHY: ICD-10-CM

## 2024-06-10 PROCEDURE — 99459 PELVIC EXAMINATION: CPT | Mod: S$PBB,,, | Performed by: STUDENT IN AN ORGANIZED HEALTH CARE EDUCATION/TRAINING PROGRAM

## 2024-06-10 PROCEDURE — 87624 HPV HI-RISK TYP POOLED RSLT: CPT | Performed by: STUDENT IN AN ORGANIZED HEALTH CARE EDUCATION/TRAINING PROGRAM

## 2024-06-10 PROCEDURE — 99459 PELVIC EXAMINATION: CPT | Mod: PBBFAC | Performed by: STUDENT IN AN ORGANIZED HEALTH CARE EDUCATION/TRAINING PROGRAM

## 2024-06-10 PROCEDURE — 99999 PR PBB SHADOW E&M-EST. PATIENT-LVL III: CPT | Mod: PBBFAC,,, | Performed by: STUDENT IN AN ORGANIZED HEALTH CARE EDUCATION/TRAINING PROGRAM

## 2024-06-10 PROCEDURE — 99386 PREV VISIT NEW AGE 40-64: CPT | Mod: S$PBB,,, | Performed by: STUDENT IN AN ORGANIZED HEALTH CARE EDUCATION/TRAINING PROGRAM

## 2024-06-10 PROCEDURE — 99213 OFFICE O/P EST LOW 20 MIN: CPT | Mod: PBBFAC | Performed by: STUDENT IN AN ORGANIZED HEALTH CARE EDUCATION/TRAINING PROGRAM

## 2024-06-10 PROCEDURE — 88175 CYTOPATH C/V AUTO FLUID REDO: CPT | Performed by: STUDENT IN AN ORGANIZED HEALTH CARE EDUCATION/TRAINING PROGRAM

## 2024-06-10 RX ORDER — CONJUGATED ESTROGENS 0.62 MG/G
CREAM VAGINAL
Qty: 30 G | Refills: 2 | Status: SHIPPED | OUTPATIENT
Start: 2024-06-10

## 2024-06-10 RX ORDER — ESTRADIOL 0.1 MG/G
1 CREAM VAGINAL DAILY
Qty: 42.5 G | Refills: 2 | Status: SHIPPED | OUTPATIENT
Start: 2024-06-10 | End: 2025-06-10

## 2024-06-10 NOTE — TELEPHONE ENCOUNTER
Patient will be out of town for the appointment scheduled. Offered to schedule the appointment through the portal or call.

## 2024-06-10 NOTE — PATIENT INSTRUCTIONS
Options for managing vaginal dryness:  Try various products until they find one that meets their needs and to find the one they like best and within their price range. The only contraindication is allergy to a particular product.     Vaginal moisturizers are intended for use routinely, typically two or three days per week, not just during sexual activity. Hyaluronic acid is often used as a key ingredient in vaginal moisturizers. Many moisturizer products are available in pharmacies and online (eg, Replens, Vagisil Moisturizer, Feminease, Moist Again, K-Y Liquibeads, Hyalo GYN, Revaree suppositories)    Lubricants are used only at the time of sexual activity. Lubricants may be water-based (eg, Astroglide, Slippery Stuff, K-Y Jelly), silicone-based (eg, Pjur, ID Millennium), or oil-based (Elegance Women's Lubricant, Simply [place]) oil-based lubricants cause breakdown of latex condoms and NOT recommended to be used with condoms.

## 2024-06-10 NOTE — PROGRESS NOTES
Subjective     Patient ID: Irena Case is a 49 y.o. female.    Chief Complaint:  Well Woman      History of Present Illness  48 yo  presents for WWE and pap    Hx of YOSELYN BSO for endometriosis a few years ago in [facility], no bleeding since then and has not been on HRT  Has had a lower sex drive for a while now, does not report much pain or discomfort with sex. Currently on lexapro, dose has been stable for a while. Tried to wean off but said it did not go well  Has considered discussing change in sex drive with a therapist but has not done so yet        Annual Exam-Postmenopausal  Patient presents for annual exam. The patient has no complaints today. The patient is sexually active. GYN screening history: last pap: was normal and last mammogram: was normal. The patient has never been taking hormone replacement therapy. Patient denies post-menopausal vaginal bleeding. The patient wears seatbelts: yes. The patient participates in regular exercise:  occasional . Has the patient ever been transfused or tattooed?:  yes: tattoo . The patient reports that there is not domestic violence in her life.    GYN & OB History  No LMP recorded. Patient has had a hysterectomy.   Date of Last Pap: No result found    OB History    Para Term  AB Living   2 2 2 0 0     SAB IAB Ectopic Multiple Live Births   0 0 0          # Outcome Date GA Lbr Andrea/2nd Weight Sex Type Anes PTL Lv   2 Term            1 Term                Review of Systems  Review of Systems   All other systems reviewed and are negative.         Objective   Physical Exam:   Constitutional: She appears well-developed and well-nourished.    HENT:   Head: Normocephalic and atraumatic.    Eyes: Conjunctivae and EOM are normal.      Pulmonary/Chest: Effort normal. Right breast exhibits no mass and no nipple discharge. Left breast exhibits no mass and no nipple discharge.        Abdominal: Soft.     Genitourinary:    Vagina, right adnexa and left adnexa normal.       Pelvic exam was performed with patient in the lithotomy position.   The external female genitalia was normal.   There is no lesion on the right labia. There is no lesion on the left labia. Cervix is normal. Right adnexum displays no mass. Left adnexum displays no mass. Vaginal atrophy noted.    pap smear completedUterus is absent.    Genitourinary Comments: Female chaperone was present for duration of exam             Musculoskeletal: Normal range of motion.       Neurological: She is alert.    Skin: Skin is warm and dry.    Psychiatric: She has a normal mood and affect. Her behavior is normal.            Assessment and Plan     1. Pap smear for cervical cancer screening    2. Vaginal atrophy        Plan:  1. Pap smear for cervical cancer screening  - Pap and HPV done today.  - Screening tests as ordered.  - Diet and exercise encouraged.  Reviewed ASCCP Pap guidelines and screening recommendations.    Counseling: Sexuality: discussed libido  Smoking  Health Screens: Mammography  Colonoscopy:discussed and UpToDate  Health Maintenance reviewed  Perimenopause/Menopause  - Ambulatory referral/consult to Obstetrics / Gynecology  - Liquid-Based Pap Smear, Screening  - HPV High Risk Genotypes, PCR    2. Vaginal atrophy  - Discussed risk of estrogen with co-morbidities such as fatty liver, HTN, and tobacco use. Will try low dose premarin twice a week with close follow up within 3 mo. Discussed cardiovascular risks with systemic estrogen, and possible exacerbation of any lingering endometriosis.   - No op note available in records  - Recommend reduction/cessation in tobacco use  - Consider behavioral health referral to discuss intimacy  - conjugated estrogens (PREMARIN) vaginal cream; Place 0.5 g vaginally twice a week  Dispense: 30 g; Refill: 2      Follow up in 3 mo     Sylvester Ambriz III, MD  [facility] - OB GYN   [address] [place] [address] [phone]

## 2024-06-13 ENCOUNTER — TELEPHONE (OUTPATIENT)
Dept: FAMILY MEDICINE | Facility: CLINIC | Age: 49
End: 2024-06-13
Payer: OTHER GOVERNMENT

## 2024-06-13 DIAGNOSIS — R41.840 INATTENTION: Primary | ICD-10-CM

## 2024-06-13 LAB
HPV HR 12 DNA SPEC QL NAA+PROBE: NEGATIVE
HPV16 AG SPEC QL: NEGATIVE
HPV18 DNA SPEC QL NAA+PROBE: NEGATIVE

## 2024-06-13 NOTE — TELEPHONE ENCOUNTER
Appointment Request From: Irena Case      With Provider: Sue Hansen [[facility]]      Preferred Date Range: 7/1/2024 - 7/18/2024      Preferred Times: Any Time      Reason for visit: Established patient/ referral for behavioral health      Comments:   Referral for behavioral health to test for ADD

## 2024-06-15 LAB
FINAL PATHOLOGIC DIAGNOSIS: NORMAL
Lab: NORMAL

## 2024-07-01 ENCOUNTER — OFFICE VISIT (OUTPATIENT)
Dept: FAMILY MEDICINE | Facility: CLINIC | Age: 49
End: 2024-07-01
Payer: OTHER GOVERNMENT

## 2024-07-01 VITALS
HEART RATE: 84 BPM | HEIGHT: 61 IN | RESPIRATION RATE: 16 BRPM | OXYGEN SATURATION: 97 % | SYSTOLIC BLOOD PRESSURE: 120 MMHG | WEIGHT: 138.25 LBS | BODY MASS INDEX: 26.1 KG/M2 | TEMPERATURE: 98 F | DIASTOLIC BLOOD PRESSURE: 78 MMHG

## 2024-07-01 DIAGNOSIS — F33.1 MODERATE EPISODE OF RECURRENT MAJOR DEPRESSIVE DISORDER: ICD-10-CM

## 2024-07-01 DIAGNOSIS — F41.9 ANXIOUSNESS: ICD-10-CM

## 2024-07-01 DIAGNOSIS — R41.840 ATTENTION AND CONCENTRATION DEFICIT: Primary | ICD-10-CM

## 2024-07-01 PROCEDURE — 99215 OFFICE O/P EST HI 40 MIN: CPT | Mod: PBBFAC,PO | Performed by: NURSE PRACTITIONER

## 2024-07-01 PROCEDURE — 99999 PR PBB SHADOW E&M-EST. PATIENT-LVL V: CPT | Mod: PBBFAC,,, | Performed by: NURSE PRACTITIONER

## 2024-07-01 PROCEDURE — 99214 OFFICE O/P EST MOD 30 MIN: CPT | Mod: S$PBB,,, | Performed by: NURSE PRACTITIONER

## 2024-07-01 NOTE — PROGRESS NOTES
Health Maintenance Due   Topic     Pneumococcal Vaccines (Age 0-64) (1 of 2 - PCV)     Shingles Vaccine (1 of 2) hx chickenpox ; inform pt can get vaccine at pharmacy.    COVID-19 Vaccine (5 - 2023-24 season)     Mammogram

## 2024-07-03 NOTE — PROGRESS NOTES
Subjective:      Patient ID: Irena Case is a 49 y.o. female.  Pt returns to clinic with concern of persistent trouble concentrating even while on summer break. 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 She does continue to take lexapro 20mg daily and denies SI/HI nor plan to harm self or others.       Review of Systems   Constitutional:  Negative for activity change, appetite change, fever and unexpected weight change.   HENT:  Negative for hearing loss, rhinorrhea and trouble swallowing.    Eyes:  Negative for discharge and visual disturbance.   Respiratory:  Negative for chest tightness, shortness of breath and wheezing.    Cardiovascular:  Negative for chest pain and palpitations.   Gastrointestinal:  Negative for abdominal pain, blood in stool, constipation, diarrhea, nausea and vomiting.   Endocrine: Negative for polydipsia and polyuria.   Genitourinary:  Negative for difficulty urinating, dysuria, hematuria and menstrual problem.   Musculoskeletal:  Negative for arthralgias, back pain, gait problem, joint swelling, myalgias and neck pain.   Integumentary:  Negative for rash.   Neurological:  Negative for dizziness, vertigo, tremors, seizures, syncope, facial asymmetry, speech difficulty, weakness, light-headedness, numbness, headaches, memory loss and coordination difficulties.   Psychiatric/Behavioral:  Positive for agitation and decreased concentration. Negative for behavioral problems, confusion, dysphoric mood, hallucinations, self-injury, sleep disturbance and suicidal ideas. The patient is nervous/anxious. The patient is not hyperactive.    All other systems reviewed and are negative.        Objective:     Vitals:    07/01/24 1316   BP: 120/78   BP Location: Right arm   Patient Position: Sitting   BP Method: Large (Manual)   Pulse: 84   Resp: 16   Temp: 98 °F (36.7 °C)   TempSrc: Oral   SpO2: 97%   Weight: 62.7 kg (138 lb 3.7 oz)  "  Height: 5' 1" (1.549 m)     Physical Exam  Vitals and nursing note reviewed.   Constitutional:       General: She is not in acute distress.     Appearance: Normal appearance. She is well-developed and well-groomed. She is not ill-appearing.   HENT:      Head: Normocephalic and atraumatic.      Right Ear: External ear normal.      Left Ear: External ear normal.      Nose: Nose normal.      Mouth/Throat:      Lips: Pink.      Mouth: Mucous membranes are moist.   Eyes:      General: Lids are normal. Vision grossly intact. Gaze aligned appropriately.      Conjunctiva/sclera: Conjunctivae normal.      Pupils: Pupils are equal, round, and reactive to light.   Neck:      Trachea: Phonation normal.   Cardiovascular:      Rate and Rhythm: Normal rate and regular rhythm.      Heart sounds: Normal heart sounds.   Pulmonary:      Effort: Pulmonary effort is normal. No accessory muscle usage or respiratory distress.      Breath sounds: Normal breath sounds and air entry.   Abdominal:      General: Abdomen is flat. Bowel sounds are normal. There is no distension.      Palpations: Abdomen is soft.      Tenderness: There is no abdominal tenderness.   Musculoskeletal:      Cervical back: Neck supple.      Right lower leg: No edema.      Left lower leg: No edema.   Skin:     General: Skin is warm and dry.      Findings: No rash.   Neurological:      General: No focal deficit present.      Mental Status: She is alert and oriented to person, place, and time. Mental status is at baseline.      Sensory: Sensation is intact.      Motor: Motor function is intact.      Coordination: Coordination is intact.      Gait: Gait is intact.   Psychiatric:         Attention and Perception: Attention and perception normal.         Mood and Affect: Mood and affect normal.         Speech: Speech normal.         Behavior: Behavior normal. Behavior is cooperative.         Thought Content: Thought content normal. Thought content does not include " homicidal or suicidal ideation. Thought content does not include homicidal or suicidal plan.         Cognition and Memory: Cognition and memory normal.         Judgment: Judgment normal.       Assessment and Plan:     1. Attention and concentration deficit  Medications: Selective Seratonin Reuptake Inhibitor continue lexapro daily.  Recommended counseling.  List of counselors provided.  Recommended transfer to psychiatry for medical management.  Reviewed concept of anxiety as biochemical imbalance of neurotransmitters and rationale for treatment.  Instructed patient to contact office or on-call physician promptly should condition worsen or any new symptoms appear and provided on-call telephone numbers. IF THE PATIENT HAS ANY SUICIDAL OR HOMICIDAL IDEATIONS, CALL THE OFFICE, DISCUSS WITH A SUPPORT MEMBER, OR GO TO THE ER IMMEDIATELY. Patient was agreeable with this plan.  - Ambulatory referral/consult to Psychology; Future  - Ambulatory referral/consult to Psychiatry; Future  - Ambulatory referral/consult to Behavioral Health; Future    2. Moderate episode of recurrent major depressive disorder  Medications: Selective Seratonin Reuptake Inhibitor continue lexapro daily.  Recommended counseling.  List of counselors provided.  Recommended transfer to psychiatry for medical management.  Reviewed concept of anxiety as biochemical imbalance of neurotransmitters and rationale for treatment.  Instructed patient to contact office or on-call physician promptly should condition worsen or any new symptoms appear and provided on-call telephone numbers. IF THE PATIENT HAS ANY SUICIDAL OR HOMICIDAL IDEATIONS, CALL THE OFFICE, DISCUSS WITH A SUPPORT MEMBER, OR GO TO THE ER IMMEDIATELY. Patient was agreeable with this plan.  - Ambulatory referral/consult to Psychology; Future  - Ambulatory referral/consult to Psychiatry; Future  - Ambulatory referral/consult to Behavioral Health; Future    3. Anxiousness  Medications: Selective  Seratonin Reuptake Inhibitor continue lexapro daily.  Recommended counseling.  List of counselors provided.  Recommended transfer to psychiatry for medical management.  Reviewed concept of anxiety as biochemical imbalance of neurotransmitters and rationale for treatment.  Instructed patient to contact office or on-call physician promptly should condition worsen or any new symptoms appear and provided on-call telephone numbers. IF THE PATIENT HAS ANY SUICIDAL OR HOMICIDAL IDEATIONS, CALL THE OFFICE, DISCUSS WITH A SUPPORT MEMBER, OR GO TO THE ER IMMEDIATELY. Patient was agreeable with this plan.  - Ambulatory referral/consult to Psychology; Future  - Ambulatory referral/consult to Psychiatry; Future  - Ambulatory referral/consult to Behavioral Health; Future           JULIA Rodriguez, FNP-C  Family/Internal Medicine  [facility]

## 2024-07-09 ENCOUNTER — HOSPITAL ENCOUNTER (OUTPATIENT)
Dept: RADIOLOGY | Facility: HOSPITAL | Age: 49
Discharge: HOME | End: 2024-07-09
Attending: INTERNAL MEDICINE
Payer: OTHER GOVERNMENT

## 2024-07-09 DIAGNOSIS — Z12.31 ENCOUNTER FOR SCREENING MAMMOGRAM FOR BREAST CANCER: ICD-10-CM

## 2024-07-09 PROCEDURE — 77063 BREAST TOMOSYNTHESIS BI: CPT | Mod: 26,,, | Performed by: RADIOLOGY

## 2024-07-09 PROCEDURE — 77067 SCR MAMMO BI INCL CAD: CPT | Mod: 26,,, | Performed by: RADIOLOGY

## 2024-07-09 PROCEDURE — 77067 SCR MAMMO BI INCL CAD: CPT | Mod: TC

## 2024-10-09 ENCOUNTER — OFFICE VISIT (OUTPATIENT)
Dept: FAMILY MEDICINE | Facility: CLINIC | Age: 49
End: 2024-10-09
Payer: OTHER GOVERNMENT

## 2024-10-09 VITALS
DIASTOLIC BLOOD PRESSURE: 80 MMHG | BODY MASS INDEX: 26.39 KG/M2 | TEMPERATURE: 98 F | OXYGEN SATURATION: 97 % | SYSTOLIC BLOOD PRESSURE: 122 MMHG | HEIGHT: 61 IN | HEART RATE: 88 BPM | WEIGHT: 139.75 LBS

## 2024-10-09 DIAGNOSIS — J01.90 ACUTE SINUSITIS WITH SYMPTOMS GREATER THAN 10 DAYS: Primary | ICD-10-CM

## 2024-10-09 PROCEDURE — 99214 OFFICE O/P EST MOD 30 MIN: CPT | Mod: PBBFAC,PO | Performed by: INTERNAL MEDICINE

## 2024-10-09 PROCEDURE — 99999 PR PBB SHADOW E&M-EST. PATIENT-LVL IV: CPT | Mod: PBBFAC,,, | Performed by: INTERNAL MEDICINE

## 2024-10-09 PROCEDURE — 99214 OFFICE O/P EST MOD 30 MIN: CPT | Mod: S$PBB,,, | Performed by: INTERNAL MEDICINE

## 2024-10-09 RX ORDER — DOXYCYCLINE HYCLATE 100 MG
100 TABLET ORAL 2 TIMES DAILY
Qty: 14 TABLET | Refills: 0 | Status: SHIPPED | OUTPATIENT
Start: 2024-10-09 | End: 2024-10-16

## 2024-10-09 RX ORDER — PROMETHAZINE HYDROCHLORIDE AND DEXTROMETHORPHAN HYDROBROMIDE 6.25; 15 MG/5ML; MG/5ML
5 SYRUP ORAL 2 TIMES DAILY PRN
Qty: 120 ML | Refills: 0 | Status: SHIPPED | OUTPATIENT
Start: 2024-10-09 | End: 2024-10-19

## 2024-10-09 NOTE — PROGRESS NOTES
SUBJECTIVE     Chief Complaint   Patient presents with    Nasal Congestion    Cough    Headache    Fatigue    Sore Throat    Generalized Body Aches       HPI  Irena Case is a 49 y.o. female with multiple medical diagnoses as listed in the medical history and problem list that presents for evaluation of URI x 1.5 week. Pt reports nasal congestion, dry cough, headache, fatigue, sore throat, and body aches. Denies any fever, chills, or night sweats. Pt has been taking Dayquil, Nyquil, Flonase, and Mucinex-DM with some improvement. Denies any sick contacts/recent travel.    PAST MEDICAL HISTORY:  Past Medical History:   Diagnosis Date    Endometriosis     Fatty liver     Hypertension        PAST SURGICAL HISTORY:  Past Surgical History:   Procedure Laterality Date    APPENDECTOMY      BREAST BIOPSY Left     benign    COLONOSCOPY N/A 01/26/2024    Procedure: COLONOSCOPY;  Surgeon: Abi Mena MD;  Location: [facility] ENDO;  Service: Endoscopy;  Laterality: N/A;  10/9 ref by Sue Hansen MD, PEG, instr. to portal-st  1/19- lvm and portal msg for pc. DBM    EYE SURGERY      LAPAROSCOPIC APPENDECTOMY N/A 04/13/2023    Procedure: APPENDECTOMY, LAPAROSCOPIC;  Surgeon: Niall Benjamin MD;  Location: [facility] OR;  Service: General;  Laterality: N/A;    LASIK Bilateral     TOTAL ABDOMINAL HYSTERECTOMY W/ BILATERAL SALPINGOOPHORECTOMY  2020    Supracervical - done in [facility]       SOCIAL HISTORY:  Social History     Socioeconomic History    Marital status:    Tobacco Use    Smoking status: Every Day     Types: Vaping with nicotine    Smokeless tobacco: Never   Substance and Sexual Activity    Alcohol use: Yes     Alcohol/week: 2.0 standard drinks of alcohol     Types: 2 Glasses of wine per week     Comment: daily    Drug use: Never   Social History Narrative    ** Merged History Encounter **          Social Drivers of Health     Financial Resource Strain: Low Risk  (1/11/2024)    Overall Financial Resource Strain (CARDIA)      Difficulty of Paying Living Expenses: Not hard at all   Food Insecurity: No Food Insecurity (1/11/2024)    Hunger Vital Sign     Worried About Running Out of Food in the Last Year: Never true     Ran Out of Food in the Last Year: Never true   Transportation Needs: No Transportation Needs (1/11/2024)    PRAPARE - Transportation     Lack of Transportation (Medical): No     Lack of Transportation (Non-Medical): No   Physical Activity: Inactive (1/11/2024)    Exercise Vital Sign     Days of Exercise per Week: 0 days     Minutes of Exercise per Session: 0 min   Stress: Stress Concern Present (1/11/2024)    [employer] - Occupational Stress Questionnaire     Feeling of Stress : To some extent   Housing Stability: Low Risk  (1/11/2024)    Housing Stability Vital Sign     Unable to Pay for Housing in the Last Year: No     Number of Places Lived in the Last Year: 1     Unstable Housing in the Last Year: No       FAMILY HISTORY:  Family History   Problem Relation Name Age of Onset    COPD Mother Kaitlynn     Asthma Mother Kaitlynn     Hypertension Mother Kaitlynn     Heart failure Mother Kaitlynn     Arthritis Mother Kaitlynn     Heart disease Father Luis         s/p quadruple bypass    Cirrhosis Neg Hx      Breast cancer Neg Hx      Ovarian cancer Neg Hx      Colon cancer Neg Hx         ALLERGIES AND MEDICATIONS: updated and reviewed.  Review of patient's allergies indicates:  No Known Allergies  Current Outpatient Medications   Medication Sig Dispense Refill    azelastine (ASTELIN) 137 mcg (0.1 %) nasal spray 1 spray (137 mcg total) by Nasal route 2 (two) times daily. 30 mL 1    celecoxib (CELEBREX) 200 MG capsule Take 1 capsule (200 mg total) by mouth every 12 (twelve) hours as needed for Pain. 60 capsule 0    EScitalopram oxalate (LEXAPRO) 20 MG tablet TAKE ONE TABLET BY MOUTH ONCE DAILY FOR MOOD Strength: 20 mg 90 tablet 3    estradioL (ESTRACE) 0.01 % (0.1 mg/gram) vaginal cream Place 1 g vaginally once  daily. For 2 weeks, then 0.5 g vaginally for 2 weeks, then 0.5 vaginally 2-3 times per week 42.5 g 2    fluticasone propionate (FLONASE) 50 mcg/actuation nasal spray SPRAY 2 SPRAYS BY EACH NOSTRIL ROUTE ONCE DAILY. 16 mL 1    hydroCHLOROthiazide (HYDRODIURIL) 25 MG tablet Take one tablet by mouth every day for fluid and blood pressure 90 tablet 3    levocetirizine (XYZAL) 5 MG tablet Take 1 tablet (5 mg total) by mouth every evening. 30 tablet 11    lisinopriL (PRINIVIL,ZESTRIL) 40 MG tablet Take 1 tablet (40 mg total) by mouth once daily. 90 tablet 3    methocarbamoL (ROBAXIN) 750 MG Tab Take 1 tablet (750 mg total) by mouth every 4 to 6 hours as needed (muscle spasm). 60 tablet 0    MULTIVITAMIN ORAL Take by mouth.      doxycycline (VIBRA-TABS) 100 MG tablet Take 1 tablet (100 mg total) by mouth 2 (two) times daily. for 7 days 14 tablet 0    promethazine-dextromethorphan (PROMETHAZINE-DM) 6.25-15 mg/5 mL Syrp Take 5 mLs by mouth 2 (two) times daily as needed. 120 mL 0     No current facility-administered medications for this visit.       ROS  Review of Systems   Constitutional:  Positive for fatigue. Negative for chills and fever.   HENT:  Positive for congestion and sore throat. Negative for hearing loss.    Eyes:  Negative for visual disturbance.   Respiratory:  Positive for cough. Negative for shortness of breath.    Cardiovascular:  Negative for chest pain, palpitations and leg swelling.   Gastrointestinal:  Negative for abdominal pain, constipation, diarrhea, nausea and vomiting.   Genitourinary:  Negative for dysuria, frequency and urgency.   Musculoskeletal:  Positive for myalgias. Negative for arthralgias and joint swelling.   Skin:  Negative for rash and wound.   Neurological:  Positive for headaches.   Psychiatric/Behavioral:  Negative for agitation and confusion. The patient is not nervous/anxious.          OBJECTIVE     Physical Exam  Vitals:    10/09/24 0838   BP: 122/80   Pulse: 88   Temp: 98.4 °F  "(36.9 °C)    Body mass index is 26.41 kg/m².  Weight: 63.4 kg (139 lb 12.4 oz)   Height: 5' 1" (154.9 cm)     Physical Exam  Constitutional:       General: She is not in acute distress.     Appearance: She is well-developed.   HENT:      Head: Normocephalic and atraumatic.      Right Ear: Tympanic membrane and external ear normal.      Left Ear: Tympanic membrane, ear canal and external ear normal.      Nose:      Right Sinus: Maxillary sinus tenderness and frontal sinus tenderness present.      Left Sinus: Maxillary sinus tenderness and frontal sinus tenderness present.      Mouth/Throat:      Pharynx: Posterior oropharyngeal erythema present.   Eyes:      General: No scleral icterus.        Right eye: No discharge.         Left eye: No discharge.      Conjunctiva/sclera: Conjunctivae normal.   Neck:      Vascular: No JVD.      Trachea: No tracheal deviation.   Cardiovascular:      Rate and Rhythm: Normal rate and regular rhythm.      Heart sounds: No murmur heard.     No friction rub. No gallop.   Pulmonary:      Effort: Pulmonary effort is normal. No respiratory distress.      Breath sounds: Normal breath sounds. No wheezing.   Abdominal:      General: Bowel sounds are normal. There is no distension.      Palpations: Abdomen is soft. There is no mass.      Tenderness: There is no abdominal tenderness. There is no guarding or rebound.   Musculoskeletal:         General: No tenderness or deformity. Normal range of motion.      Cervical back: Normal range of motion and neck supple.   Skin:     General: Skin is warm and dry.      Findings: No erythema or rash.   Neurological:      Mental Status: She is alert and oriented to person, place, and time.      Motor: No abnormal muscle tone.      Coordination: Coordination normal.   Psychiatric:         Behavior: Behavior normal.         Thought Content: Thought content normal.         Judgment: Judgment normal.           Health Maintenance         Date Due Completion Date "    Pneumococcal Vaccines (Age 0-64) (1 of 2 - PCV) Never done ---    Shingles Vaccine (1 of 2) Never done ---    Influenza Vaccine (1) 09/01/2024 1/21/2010    COVID-19 Vaccine (5 - 2024-25 season) 09/01/2024 5/17/2022    Lipid Panel 04/17/2025 4/17/2024    Mammogram 07/09/2025 7/9/2024    Hemoglobin A1c (Diabetic Prevention Screening) 04/17/2027 4/17/2024    Colorectal Cancer Screening 01/26/2031 1/26/2024    TETANUS VACCINE 12/29/2033 12/29/2023    RSV Vaccine (Age 60+ and Pregnant patients) (1 - 1-dose 75+ series) 03/10/2048 ---              ASSESSMENT     49 y.o. female with     1. Acute sinusitis with symptoms greater than 10 days        PLAN:     1. Acute sinusitis with symptoms greater than 10 days  - Pt advised to take Abx to completion  - Continue symptomatic treatment with rest, increase fluid intake, tylenol or ibuprofen PRN fever(temp >/= 100.4) or body aches. Okay to take OTC antihistamines, i.e. Bendaryl, Claritin, Allegra, etc. as needed.  - Okay to gargle with warm, salt water or use throat lozenges as needed  - doxycycline (VIBRA-TABS) 100 MG tablet; Take 1 tablet (100 mg total) by mouth 2 (two) times daily. for 7 days  Dispense: 14 tablet; Refill: 0  - promethazine-dextromethorphan (PROMETHAZINE-DM) 6.25-15 mg/5 mL Syrp; Take 5 mLs by mouth 2 (two) times daily as needed.  Dispense: 120 mL; Refill: 0        RTC in 1-2 weeks as needed for any acute worsening of current condition or failure to improve       Sue Hansen MD  10/09/2024 8:48 AM        No follow-ups on file.

## 2024-10-11 ENCOUNTER — PATIENT MESSAGE (OUTPATIENT)
Dept: FAMILY MEDICINE | Facility: CLINIC | Age: 49
End: 2024-10-11
Payer: OTHER GOVERNMENT

## 2024-10-22 ENCOUNTER — PATIENT MESSAGE (OUTPATIENT)
Dept: FAMILY MEDICINE | Facility: CLINIC | Age: 49
End: 2024-10-22
Payer: OTHER GOVERNMENT

## 2024-10-23 DIAGNOSIS — T36.95XA ANTIBIOTIC-INDUCED YEAST INFECTION: Primary | ICD-10-CM

## 2024-10-23 DIAGNOSIS — B37.9 ANTIBIOTIC-INDUCED YEAST INFECTION: Primary | ICD-10-CM

## 2024-10-23 RX ORDER — FLUCONAZOLE 150 MG/1
150 TABLET ORAL DAILY
Qty: 1 TABLET | Refills: 0 | Status: SHIPPED | OUTPATIENT
Start: 2024-10-23 | End: 2024-10-24

## 2024-10-30 NOTE — TELEPHONE ENCOUNTER
Left voicemail and sent portal message for patient to call Endoscopy Scheduling to review instructions and confirm appointment for Colonoscopy on 1/26/24.    
Quality 110: Preventive Care And Screening: Influenza Immunization: Influenza Immunization previously received during influenza season
Quality 138: Melanoma: Coordination Of Care: Treatment plan not communicated, reason not otherwise specified.
Detail Level: Detailed
Quality 226: Preventive Care And Screening: Tobacco Use: Screening And Cessation Intervention: Patient screened for tobacco use and is an ex/non-smoker
Quality 431: Preventive Care And Screening: Unhealthy Alcohol Use - Screening: Patient not identified as an unhealthy alcohol user when screened for unhealthy alcohol use using a systematic screening method
Quality 130: Documentation Of Current Medications In The Medical Record: Current Medications Documented
Quality 137: Melanoma: Continuity Of Care - Recall System: Recall system not utilized, reason not otherwise specified
Quality 47: Advance Care Plan: Advance care planning not documented, reason not otherwise specified.

## 2025-01-13 DIAGNOSIS — I10 PRIMARY HYPERTENSION: ICD-10-CM

## 2025-01-13 RX ORDER — LISINOPRIL 40 MG/1
40 TABLET ORAL DAILY
Qty: 90 TABLET | Refills: 0 | Status: SHIPPED | OUTPATIENT
Start: 2025-01-13

## 2025-01-13 NOTE — TELEPHONE ENCOUNTER
Provider Staff:  Action required for this patient    Requires labs      Please see care gap opportunities below in Care Due Message.    Thanks!  [facility]     Appointments      Date Provider   Last Visit   10/9/2024 Sue Hansen MD   Next Visit   Visit date not found Sue Hansen MD     Refill Decision Note   Irena Case  is requesting a refill authorization.  Brief Assessment and Rationale for Refill:  Approve     Medication Therapy Plan:         Comments:     Note composed:1:47 PM 01/13/2025

## 2025-01-13 NOTE — TELEPHONE ENCOUNTER
Care Due:                  Date            Visit Type   Department     Provider  --------------------------------------------------------------------------------                                SAME DAY -   [facility]   MEDICINE/INTERN  Last Visit: 10-      PATIENT      AL MED         Sue Hansen  Next Visit: None Scheduled  None         None Found                                                            Last  Test          Frequency    Reason                     Performed    Due Date  --------------------------------------------------------------------------------    CMP.........  12 months..  hydroCHLOROthiazide,       04- 04-                             lisinopriL...............    [employer] Embedded Care Due Messages. Reference number: [id number].   1/13/2025 9:37:29 AM CST

## 2025-02-10 ENCOUNTER — OFFICE VISIT (OUTPATIENT)
Dept: PSYCHIATRY | Facility: CLINIC | Age: 50
End: 2025-02-10
Payer: OTHER GOVERNMENT

## 2025-02-10 VITALS
WEIGHT: 145.38 LBS | BODY MASS INDEX: 27.45 KG/M2 | SYSTOLIC BLOOD PRESSURE: 166 MMHG | DIASTOLIC BLOOD PRESSURE: 83 MMHG | HEART RATE: 66 BPM | HEIGHT: 61 IN

## 2025-02-10 DIAGNOSIS — F33.1 MODERATE EPISODE OF RECURRENT MAJOR DEPRESSIVE DISORDER: ICD-10-CM

## 2025-02-10 DIAGNOSIS — F41.9 ANXIOUSNESS: ICD-10-CM

## 2025-02-10 DIAGNOSIS — F90.9 ATTENTION DEFICIT HYPERACTIVITY DISORDER (ADHD), UNSPECIFIED ADHD TYPE: ICD-10-CM

## 2025-02-10 PROCEDURE — 90792 PSYCH DIAG EVAL W/MED SRVCS: CPT | Mod: ,,,

## 2025-02-10 PROCEDURE — 99213 OFFICE O/P EST LOW 20 MIN: CPT | Mod: PBBFAC

## 2025-02-10 PROCEDURE — 99999 PR PBB SHADOW E&M-EST. PATIENT-LVL III: CPT | Mod: PBBFAC,,,

## 2025-02-10 NOTE — PROGRESS NOTES
Outpatient Psychiatry Initial Visit (MD/NP)    2/10/2025    Irena Case, a 49 y.o. female, presenting for initial evaluation visit. Met with patient.    Reason for Encounter: self-referral. Patient complains of No chief complaint on file.  .    History of Present Illness: Anxiety  Patient is here for evaluation of anxiety.  She has the following anxiety symptoms: difficulty concentrating, insomnia. Onset of symptoms was approximately several years ago.  Symptoms have been unchanged since that time. She denies current suicidal and homicidal ideation. Family history significant for alcoholism.Possible organic causes contributing are: medications, drug abuse, endocrine/metabolic, neuro. Risk factors: previous episode of depression Previous treatment includes medication Wellbutrin.   She complains of the following medication side effects: none.    Depression  Patient complains of depression. She complains of depressed mood, impaired memory, and insomnia. Onset was approximately several  years  ago. Symptoms have been unchanged since that time. Current symptoms include: depressed mood and difficulty concentrating. Patient denies recurrent thoughts of death, suicidal attempt, suicidal thoughts with specific plan, and suicidal thoughts without plan. Family history significant for no psychiatric illness. Possible organic causes contributing are: medications, drug abuse, endocrine/metabolic, neuro. Risk factors: previous episode of depression. Previous treatment includes medication. She complains of the following side effects from the treatment: none.      Initially states symptoms of inattention, concentration began 'a few years ago'  Then states she feels they began much earlier in life.  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 Significant overuse, discussed w/ patient, she is aware.    Grown children, daughter lives in [place], son lives in [place], Is in the  [employer].            CURRENT PSYCHOTROPIC REGIMEN:  Lexapro - she has taken for years, had previously attempted to wean herself off, but when she was down to 1/2 every couple of days,     Failed wellbutrin  Failed citalopram    Compliance: yes   reviewed without concern yes  Side effects: no  Patient's overall opinion of current regimen: Working well  Life event tracker/ stressors/ relationships:         PSYCHIATRIC ROS:  Depressed mood:  worsened of late   Sleep Disturbance: - a lot of times she will lay in bed and try to go to sleep - so she drinks alcohol.   interest/pleasure/anhedonia: no  Negative-self talk /guilty/hopelessness: no  energy/anergy: no  concentration: yes   Appetite disturbance: no   Self-injurious /risky behavior: no  Psychomotor disturbance: no  Suicidal Ideation:  no  Chronic daily anxiety/panic: no   Denies: severe panic associated with chest pain, palpitations, hyperventilation, sense of doom, diaphoresis.   Obsessions/Recurrent thoughts:  denies  Compulsions/ Recurrent behaviors:  denies     RISK PARAMETERS:  Patient reports no suicidal ideation  Patient reports no homicidal ideation  Patient reports no self-injurious behavior  Patient reports no violent behavior       SOCIAL HISTORY:  Lives in own home with    Work - yes, teaches fifth grade   Relationship - endorses some relatationship difficulties  Children - two grown children   Pentecostal - tries to go to Yazdanism    -  was in [facility]          PAST PSYCHIATRIC HISTORY:  Family Psychiatric History:  Denies family history of bipolar , schizophrenia ,, psychiatric hospitalizations , suicide attempts of completions.  Previous Psychiatric Care: no  Previous Psychiatric Hospitalizations: no;   Previous Suicide Attempts/ Non-suicidal  "self injury: no  Previous Medication Trials: Tried wellbutrin - did not work for you , Citalopram - did not work         LEGAL, VIOLENCE:  History of Violence: no  Legal history: no  DWI / DUI: no  Access to Gun: no        SUBSTANCE ABUSE HISTORY:  denies     NEUROLOGIC HISTORY:  Seizures: no  Head trauma: no                    Review Of Systems:     GENERAL:  No weight gain or loss  SKIN:  No rashes or lacerations  HEAD:  No headaches  EYES:  No exophthalmos, jaundice or blindness  EARS:  No dizziness, tinnitus or hearing loss  NOSE:  No changes in smell  MOUTH & THROAT:  No dyskinetic movements or obvious goiter  CHEST:  No shortness of breath, hyperventilation or cough  CARDIOVASCULAR:  No tachycardia or chest pain  ABDOMEN:  No nausea, vomiting, pain, constipation or diarrhea  URINARY:  No frequency, dysuria or sexual dysfunction  ENDOCRINE:  No polydipsia, polyuria  MUSCULOSKELETAL:  No pain or stiffness of the joints  NEUROLOGIC:  No weakness, sensory changes, seizures, confusion, memory loss, tremor or other abnormal movements    Current Evaluation:     Nutritional Screening: Considering the patient's height and weight, medications, medical history and preferences, should a referral be made to the dietitian? no    Constitutional  Vitals:  Most recent vital signs, dated less than 90 days prior to this appointment, were reviewed.    Vitals:    02/10/25 0857   BP: (!) 171/80   Pulse: 72   Weight: 65.9 kg (145 lb 6.3 oz)   Height: 5' 1" (1.549 m)        General:  unremarkable, age appropriate     Musculoskeletal  Muscle Strength/Tone:  no tremor, no tic   Gait & Station:  non-ataxic     Psychiatric  Speech:  no latency; no press   Mood & Affect:  anxious  congruent and appropriate   Thought Process:  normal and logical   Associations:  intact   Thought Content:  normal, no suicidality, no homicidality, delusions, or paranoia   Insight:  intact   Judgement: behavior is adequate to circumstances   Orientation:  " grossly intact   Memory: intact for content of interview   Language: grossly intact   Attention Span & Concentration:  able to focus   Fund of Knowledge:  intact and appropriate to age and level of education       Relevant Elements of Neurological Exam: normal gait    Functioning in Relationships:  Spouse/partner: endorses sometimes difficult relationship  Peers: Mild peer support  Employers: Enjoys work, can be stressful at times.    Laboratory Data  No visits with results within 1 Month(s) from this visit.   Latest known visit with results is:   Office Visit on 06/10/2024   Component Date Value Ref Range Status    Final Pathologic Diagnosis 06/10/2024    Final                    Value:Specimen Adequacy  Satisfactory for interpretation. Endocervical component is present.    [place] Category  Negative for intraepithelial lesion or malignancy.  Atrophic smear.  Inflammation present.      Disclaimer 06/10/2024    Final                    Value:The Pap smear is a screening test that aids in the detection of cervical cancer and cancer precursors. Both false positive and false negative results can occur. The test should be used at regular intervals, and positive results should be confirmed before   definitive therapy.  This liquid based specimen is processed using the  or [id number] Thin PrepPAP System. This specimen has been analyzed by the ThinPrep Imaging System ([employer]), an automated imaging and review system which assists the laboratory in evaluating   cells on ThinPrep PAP tests. Following automated imaging, selected fields from every slide are reviewed by a cytotechnologist and/or pathologist.     Screening was performed at [facility], [address] [place] [address].      HPV other High Risk types, PCR 06/10/2024 Negative  Negative Final    HPV High Risk type 16, PCR 06/10/2024 Negative  Negative Final    HPV High Risk type 18, PCR 06/10/2024  Negative  Negative Final         Medications  Outpatient Encounter Medications as of 2/10/2025   Medication Sig Dispense Refill    azelastine (ASTELIN) 137 mcg (0.1 %) nasal spray 1 spray (137 mcg total) by Nasal route 2 (two) times daily. 30 mL 1    celecoxib (CELEBREX) 200 MG capsule Take 1 capsule (200 mg total) by mouth every 12 (twelve) hours as needed for Pain. 60 capsule 0    EScitalopram oxalate (LEXAPRO) 20 MG tablet TAKE ONE TABLET BY MOUTH ONCE DAILY FOR MOOD Strength: 20 mg 90 tablet 3    estradioL (ESTRACE) 0.01 % (0.1 mg/gram) vaginal cream Place 1 g vaginally once daily. For 2 weeks, then 0.5 g vaginally for 2 weeks, then 0.5 vaginally 2-3 times per week 42.5 g 2    fluticasone propionate (FLONASE) 50 mcg/actuation nasal spray SPRAY 2 SPRAYS BY EACH NOSTRIL ROUTE ONCE DAILY. 16 mL 1    hydroCHLOROthiazide (HYDRODIURIL) 25 MG tablet Take one tablet by mouth every day for fluid and blood pressure 90 tablet 3    levocetirizine (XYZAL) 5 MG tablet Take 1 tablet (5 mg total) by mouth every evening. 30 tablet 11    lisinopriL (PRINIVIL,ZESTRIL) 40 MG tablet Take 1 tablet (40 mg total) by mouth once daily. 90 tablet 0    methocarbamoL (ROBAXIN) 750 MG Tab Take 1 tablet (750 mg total) by mouth every 4 to 6 hours as needed (muscle spasm). 60 tablet 0    MULTIVITAMIN ORAL Take by mouth.      [DISCONTINUED] lisinopriL (PRINIVIL,ZESTRIL) 40 MG tablet Take 1 tablet (40 mg total) by mouth once daily. 90 tablet 3     No facility-administered encounter medications on file as of 2/10/2025.           Assessment - Diagnosis - Goals:     Impression:       ICD-10-CM ICD-9-CM   1. Attention and concentration deficit  R41.840 799.51   2. Moderate episode of recurrent major depressive disorder  F33.1 296.32   3. Anxiousness  F41.9 300.00       Strengths and Liabilities: Strength: Patient accepts guidance/feedback, Strength: Patient is expressive/articulate., Strength: Patient is intelligent., Liability: Patient lacks coping  skills.    Treatment Goals:  Specify outcomes written in observable, behavioral terms:   Anxiety: acquiring relapse prevention skills, reducing negative automatic thoughts, and reducing physical symptoms of anxiety  Depression: acquiring relapse prevention skills, increasing energy, increasing interest in usual activities, and increasing motivation    Treatment Plan/Recommendations:   Medication Management: Continue current medications. The risks and benefits of medication were discussed with the patient.  Referral for further treatment to ADHD clinic  The treatment plan and follow up plan were reviewed with the patient.  She is interested in psychotherapy, gave referrals in her local area.     There is a lack of clarity regarding patient's symptoms, could well be a result of anxiety and excessive alcohol consumption. However as she today feels that her symptoms of inattention and concentration began early in life, I am referring her to ADHD clinic for testing.       Return to Clinic:  upon completion of ADHD clinic    Total time: I spent a total of 61 minutes on the day of the visit.  This includes face to face time and non-face to face time preparing to see the patient (eg, review of tests), obtaining and/or reviewing separately obtained history, documenting clinical information in the electronic or other health record, independently interpreting results and communicating results to the patient/family/caregiver, or care coordinator.    Consulting clinician was informed of the encounter and consult note.

## 2025-02-17 ENCOUNTER — CLINICAL SUPPORT (OUTPATIENT)
Dept: PSYCHIATRY | Facility: CLINIC | Age: 50
End: 2025-02-17
Payer: OTHER GOVERNMENT

## 2025-02-17 DIAGNOSIS — F90.9 ATTENTION DEFICIT HYPERACTIVITY DISORDER (ADHD), UNSPECIFIED ADHD TYPE: ICD-10-CM

## 2025-02-17 PROCEDURE — 99211 OFF/OP EST MAY X REQ PHY/QHP: CPT | Mod: PBBFAC

## 2025-02-17 NOTE — PROGRESS NOTES
Adult ADHD Clinic Orientation Seminar   Orientation/Psychoeducation    Patient's attendance: Attended in Full    Seminar/Group Focus: ADHD Clinic Orientation Seminar  Target symptoms: ADHD    Site: [facility]  Clinical status of patient: Outpatient  No LOS. Billing Provider and Co-signer: Chantal Lance, PhD (see signature below)  Length of Service: 35 minutes    Seminar/Group Topic:  Behavioral Health  Seminar/Group Department: [address]  Seminar/Group Facilitators: Mela Brannon MS  # of patients: 8    Interval history: Patient presented for the Adult ADHD Clinic orientation seminar. The seminar provided information regarding guidelines and structure of assessment, diagnosis, and treatment in this clinic.    Diagnosis:   1. Attention deficit hyperactivity disorder (ADHD), unspecified ADHD type  Ambulatory referral/consult to Psychology        Patient's response: Accepting  Progress toward goals and other mental status changes: As expected    Plan: Patient will indicate whether to proceed with the Adult ADHD Clinic pre-screen  Return to clinic: as scheduled        Chantal Lance, PhD  Clinical Psychologist    70 yo F w/ PMH of CKD stage 4 (baseline Cr 1.9-2.2) HTN, T2DM, CAD s/p CABG X3 (2014 at [facility]), non-dilated ICM (EF 20-25%), severe mitral regurgitation s/p mitral clip (6/13/19), severe pulm HTN, hypothyroidism who presented for evaluation of SOB and was admitted for ADHF.    CKD stage 4  - baseline Cr 1.9-2.2; has had recurrent MERVIN's over the years  - CKD is likely 2/2 recurrent MERVIN's + underlying DM/HTN  - Scr fluctuating however remains within baseline   - Agree w. IV diuretics per heart failure team   - renal sonogram in the past with simple renal cyst  - Avoid nephrotoxins including NSAIDs, IV contrast (if possible), Fleet's products  - monitor I/O's accurately    HTN  BP controlled  Low salt diet   MOnitor BP        Proteinuria/Hematuria  - likely from underlying DM  - has 1.8 grams proteinuria  - Hep B, Hep C, HIV RF, C3, C4, p-ANCA, c-ANCA, RPR neg  - weak gamma-migrating protein, weak IgG lambda protein band noted in the past. Pt to follow w/ heme   - DEAN 1:320 positive  - RPR neg, FTA +

## 2025-02-18 ENCOUNTER — PATIENT MESSAGE (OUTPATIENT)
Dept: PSYCHIATRY | Facility: CLINIC | Age: 50
End: 2025-02-18
Payer: OTHER GOVERNMENT

## 2025-02-25 ENCOUNTER — OFFICE VISIT (OUTPATIENT)
Dept: FAMILY MEDICINE | Facility: CLINIC | Age: 50
End: 2025-02-25
Payer: OTHER GOVERNMENT

## 2025-02-25 ENCOUNTER — PATIENT MESSAGE (OUTPATIENT)
Dept: FAMILY MEDICINE | Facility: CLINIC | Age: 50
End: 2025-02-25

## 2025-02-25 VITALS
OXYGEN SATURATION: 98 % | SYSTOLIC BLOOD PRESSURE: 124 MMHG | WEIGHT: 148.81 LBS | RESPIRATION RATE: 16 BRPM | TEMPERATURE: 98 F | HEIGHT: 61 IN | HEART RATE: 93 BPM | BODY MASS INDEX: 28.1 KG/M2 | DIASTOLIC BLOOD PRESSURE: 88 MMHG

## 2025-02-25 DIAGNOSIS — J06.9 ACUTE URI OF MULTIPLE SITES: Primary | ICD-10-CM

## 2025-02-25 PROCEDURE — 99999 PR PBB SHADOW E&M-EST. PATIENT-LVL IV: CPT | Mod: PBBFAC,,, | Performed by: NURSE PRACTITIONER

## 2025-02-25 PROCEDURE — 0241U SARS-COV2 (COVID) WITH FLU/RSV BY PCR: CPT | Performed by: NURSE PRACTITIONER

## 2025-02-25 PROCEDURE — 99214 OFFICE O/P EST MOD 30 MIN: CPT | Mod: S$PBB,,, | Performed by: NURSE PRACTITIONER

## 2025-02-25 PROCEDURE — 99214 OFFICE O/P EST MOD 30 MIN: CPT | Mod: PBBFAC,PO | Performed by: NURSE PRACTITIONER

## 2025-02-26 ENCOUNTER — PATIENT MESSAGE (OUTPATIENT)
Dept: FAMILY MEDICINE | Facility: CLINIC | Age: 50
End: 2025-02-26
Payer: OTHER GOVERNMENT

## 2025-02-26 LAB
INFLUENZA A, MOLECULAR: NOT DETECTED
INFLUENZA B, MOLECULAR: NOT DETECTED
RSV AG BY MOLECULAR METHOD: NOT DETECTED
SARS-COV-2 RNA RESP QL NAA+PROBE: NOT DETECTED

## 2025-02-27 RX ORDER — AZITHROMYCIN 500 MG/1
500 TABLET, FILM COATED ORAL DAILY
Qty: 5 TABLET | Refills: 0 | Status: SHIPPED | OUTPATIENT
Start: 2025-02-27

## 2025-02-27 RX ORDER — PREDNISONE 50 MG/1
50 TABLET ORAL DAILY
Qty: 5 TABLET | Refills: 0 | Status: SHIPPED | OUTPATIENT
Start: 2025-02-27

## 2025-03-10 ENCOUNTER — PATIENT MESSAGE (OUTPATIENT)
Dept: PSYCHIATRY | Facility: CLINIC | Age: 50
End: 2025-03-10
Payer: OTHER GOVERNMENT
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
## 2025-03-11 ENCOUNTER — CLINICAL SUPPORT (OUTPATIENT)
Dept: PSYCHIATRY | Facility: CLINIC | Age: 50
End: 2025-03-11
Payer: OTHER GOVERNMENT

## 2025-03-11 DIAGNOSIS — Z00.8 ENCOUNTER FOR PSYCHOLOGICAL EVALUATION: Primary | ICD-10-CM

## 2025-03-11 NOTE — PROGRESS NOTES
Subjective:      Patient ID: Irena Case is a 50 y.o. female.  Pt is a  that returns to clinic with acute URI symptoms that began a few days ago and gradually worsening despite taking claritin D, flonase, astelin, tylenol and motrin.     URI   This is a new problem. The current episode started in the past 7 days. The problem has been gradually worsening. There has been no fever. The cough is Non-productive. Associated symptoms include congestion, coughing, ear pain, headaches, nausea, a plugged ear sensation, rhinorrhea, sinus pain, a sore throat and swollen glands. Pertinent negatives include no abdominal pain, chest pain, conjunctivitis, diarrhea, dysuria, joint pain, joint swelling, neck pain, rash, sneezing, vomiting or wheezing. The treatment provided no relief.     Review of Systems   Constitutional:  Positive for fatigue. Negative for activity change, appetite change, chills, diaphoresis, fever, night sweats and unexpected weight change.   HENT:  Positive for nasal congestion, ear pain, postnasal drip, rhinorrhea, sinus pressure/congestion and sore throat. Negative for sneezing, trouble swallowing and voice change.    Eyes:  Negative for pain and visual disturbance.   Respiratory:  Positive for cough. Negative for chest tightness, shortness of breath and wheezing.    Cardiovascular:  Negative for chest pain, palpitations, leg swelling and claudication.   Gastrointestinal:  Positive for nausea. Negative for abdominal distention, abdominal pain, anal bleeding, blood in stool, change in bowel habit, constipation, diarrhea, rectal pain, vomiting, reflux and fecal incontinence.   Genitourinary:  Negative for difficulty urinating and dysuria.   Musculoskeletal:  Positive for arthralgias and myalgias. Negative for back pain, gait problem, joint pain, joint swelling, leg pain, neck pain, neck stiffness and joint deformity.   Integumentary:  Negative for rash.   Allergic/Immunologic: Positive for  "environmental allergies. Negative for food allergies and immunocompromised state.   Neurological:  Positive for weakness, light-headedness and headaches. Negative for dizziness, vertigo, tremors, seizures, syncope, facial asymmetry, speech difficulty, numbness, memory loss and coordination difficulties.   Hematological:  Positive for adenopathy.   Psychiatric/Behavioral: Negative.     All other systems reviewed and are negative.        Objective:     Vitals:    02/25/25 1341   BP: 124/88   Pulse: 93   Resp: 16   Temp: 98.4 °F (36.9 °C)   TempSrc: Oral   SpO2: 98%   Weight: 67.5 kg (148 lb 13 oz)   Height: 5' 1" (1.549 m)     Physical Exam  Vitals and nursing note reviewed.   Constitutional:       General: She is not in acute distress.     Appearance: Normal appearance. She is well-developed and well-groomed. She is not ill-appearing.   HENT:      Head: Normocephalic and atraumatic.      Right Ear: Ear canal and external ear normal. No middle ear effusion. No mastoid tenderness. Tympanic membrane is injected. Tympanic membrane is not perforated or bulging.      Left Ear: Ear canal and external ear normal.  No middle ear effusion. No mastoid tenderness. Tympanic membrane is injected. Tympanic membrane is not perforated or bulging.      Nose: Mucosal edema, congestion and rhinorrhea present.      Right Sinus: Maxillary sinus tenderness and frontal sinus tenderness present.      Left Sinus: Maxillary sinus tenderness and frontal sinus tenderness present.      Mouth/Throat:      Lips: Pink.      Mouth: Mucous membranes are moist.      Pharynx: Oropharynx is clear. Uvula midline. Posterior oropharyngeal erythema and postnasal drip present. No pharyngeal swelling, oropharyngeal exudate or uvula swelling.   Eyes:      General: Lids are normal. Vision grossly intact. Gaze aligned appropriately. Allergic shiner present.      Conjunctiva/sclera: Conjunctivae normal.      Pupils: Pupils are equal, round, and reactive to light. "   Neck:      Trachea: Phonation normal.   Cardiovascular:      Rate and Rhythm: Normal rate and regular rhythm.      Heart sounds: Normal heart sounds.   Pulmonary:      Effort: Pulmonary effort is normal. No accessory muscle usage, prolonged expiration or respiratory distress.      Breath sounds: Normal breath sounds and air entry. No decreased breath sounds, wheezing, rhonchi or rales.   Abdominal:      General: Abdomen is flat. Bowel sounds are normal. There is no distension.      Palpations: Abdomen is soft.      Tenderness: There is no abdominal tenderness.   Musculoskeletal:      Cervical back: Neck supple.      Right lower leg: No edema.      Left lower leg: No edema.   Lymphadenopathy:      Cervical: No cervical adenopathy.   Skin:     General: Skin is warm and dry.      Findings: No rash.   Neurological:      General: No focal deficit present.      Mental Status: She is alert and oriented to person, place, and time. Mental status is at baseline.      Sensory: Sensation is intact.      Motor: Motor function is intact.      Coordination: Coordination is intact.      Gait: Gait is intact.   Psychiatric:         Attention and Perception: Attention and perception normal.         Mood and Affect: Mood and affect normal.         Speech: Speech normal.         Behavior: Behavior normal. Behavior is cooperative.         Thought Content: Thought content normal.         Cognition and Memory: Cognition and memory normal.         Judgment: Judgment normal.       Results for orders placed or performed in visit on 02/25/25   SARS-Cov2 (COVID) with FLU/RSV by PCR    Collection Time: 02/25/25  2:03 PM   Result Value Ref Range    SARS-CoV2 (COVID-19) Qualitative PCR Not Detected Not Detected    Influenza A, Molecular Not Detected Not Detected    Influenza B, Molecular Not Detected Not Detected    RSV Ag by Molecular Method Not Detected Not Detected     Assessment and Plan:     1. Acute URI of multiple sites  (Primary)  Discussed diagnosis and treatment of URI.  Discussed the importance of avoiding unnecessary antibiotic therapy.  Follow up as needed.  Symptomatic therapy suggested: rest, increase fluids, gargle prn for sore throat, apply heat to sinuses prn, use mist of vaporizer prn, OTC acetaminophen, ibuprofen, antihistamine-decongestant of choice, cough suppressant of choice, and call prn if symptoms persist or worsen.   Call or return to clinic prn if these symptoms worsen or fail to improve as anticipated.  - SARS-Cov2 (COVID) with FLU/RSV by PCR           JULIA Rodriguez, FNP-C  Family/Internal Medicine  [facility]

## 2025-03-27 ENCOUNTER — OFFICE VISIT (OUTPATIENT)
Dept: PSYCHIATRY | Facility: CLINIC | Age: 50
End: 2025-03-27
Payer: OTHER GOVERNMENT

## 2025-03-27 VITALS
BODY MASS INDEX: 27.95 KG/M2 | DIASTOLIC BLOOD PRESSURE: 81 MMHG | HEART RATE: 81 BPM | WEIGHT: 147.94 LBS | SYSTOLIC BLOOD PRESSURE: 117 MMHG

## 2025-03-27 DIAGNOSIS — F33.0 MILD EPISODE OF RECURRENT MAJOR DEPRESSIVE DISORDER: ICD-10-CM

## 2025-03-27 DIAGNOSIS — F90.9 ATTENTION DEFICIT HYPERACTIVITY DISORDER (ADHD), UNSPECIFIED ADHD TYPE: Primary | ICD-10-CM

## 2025-03-27 PROCEDURE — G2211 COMPLEX E/M VISIT ADD ON: HCPCS | Mod: S$PBB,,,

## 2025-03-27 PROCEDURE — 99214 OFFICE O/P EST MOD 30 MIN: CPT | Mod: S$PBB,,,

## 2025-03-27 PROCEDURE — 99212 OFFICE O/P EST SF 10 MIN: CPT | Mod: PBBFAC

## 2025-03-27 PROCEDURE — 99999 PR PBB SHADOW E&M-EST. PATIENT-LVL II: CPT | Mod: PBBFAC,,,

## 2025-03-27 RX ORDER — DEXTROAMPHETAMINE SACCHARATE, AMPHETAMINE ASPARTATE MONOHYDRATE, DEXTROAMPHETAMINE SULFATE AND AMPHETAMINE SULFATE 2.5; 2.5; 2.5; 2.5 MG/1; MG/1; MG/1; MG/1
10 CAPSULE, EXTENDED RELEASE ORAL EVERY MORNING
Qty: 30 CAPSULE | Refills: 0 | Status: SHIPPED | OUTPATIENT
Start: 2025-03-27
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
## 2025-04-22 ENCOUNTER — PATIENT MESSAGE (OUTPATIENT)
Dept: PSYCHIATRY | Facility: CLINIC | Age: 50
End: 2025-04-22
Payer: OTHER GOVERNMENT

## 2025-04-22 DIAGNOSIS — F90.9 ATTENTION DEFICIT HYPERACTIVITY DISORDER (ADHD), UNSPECIFIED ADHD TYPE: ICD-10-CM

## 2025-04-25 RX ORDER — DEXTROAMPHETAMINE SACCHARATE, AMPHETAMINE ASPARTATE MONOHYDRATE, DEXTROAMPHETAMINE SULFATE AND AMPHETAMINE SULFATE 2.5; 2.5; 2.5; 2.5 MG/1; MG/1; MG/1; MG/1
10 CAPSULE, EXTENDED RELEASE ORAL EVERY MORNING
Qty: 30 CAPSULE | Refills: 0 | Status: SHIPPED | OUTPATIENT
Start: 2025-04-25 | End: 2025-04-25 | Stop reason: SDUPTHER

## 2025-04-25 RX ORDER — DEXTROAMPHETAMINE SACCHARATE, AMPHETAMINE ASPARTATE MONOHYDRATE, DEXTROAMPHETAMINE SULFATE AND AMPHETAMINE SULFATE 5; 5; 5; 5 MG/1; MG/1; MG/1; MG/1
20 CAPSULE, EXTENDED RELEASE ORAL EVERY MORNING
Qty: 30 CAPSULE | Refills: 0 | Status: SHIPPED | OUTPATIENT
Start: 2025-04-25

## 2025-04-28 ENCOUNTER — OFFICE VISIT (OUTPATIENT)
Dept: PSYCHIATRY | Facility: CLINIC | Age: 50
End: 2025-04-28
Payer: OTHER GOVERNMENT

## 2025-04-28 DIAGNOSIS — F90.9 ATTENTION DEFICIT HYPERACTIVITY DISORDER (ADHD), UNSPECIFIED ADHD TYPE: Primary | ICD-10-CM

## 2025-04-28 DIAGNOSIS — F33.0 MILD EPISODE OF RECURRENT MAJOR DEPRESSIVE DISORDER: ICD-10-CM

## 2025-04-28 DIAGNOSIS — I10 PRIMARY HYPERTENSION: ICD-10-CM

## 2025-04-28 PROCEDURE — 98006 SYNCH AUDIO-VIDEO EST MOD 30: CPT | Mod: 95,,,

## 2025-04-28 PROCEDURE — G2211 COMPLEX E/M VISIT ADD ON: HCPCS | Mod: 95,,,

## 2025-04-28 RX ORDER — HYDROCHLOROTHIAZIDE 25 MG/1
TABLET ORAL
Qty: 90 TABLET | Refills: 1 | Status: SHIPPED | OUTPATIENT
Start: 2025-04-28

## 2025-04-28 RX ORDER — LISINOPRIL 40 MG/1
40 TABLET ORAL
Qty: 90 TABLET | Refills: 1 | Status: SHIPPED | OUTPATIENT
Start: 2025-04-28

## 2025-04-28 NOTE — TELEPHONE ENCOUNTER
Refill Routing Note   Medication(s) are not appropriate for processing by [facility] for the following reason(s):        Required labs outdated    [facility] action(s):  Defer     Requires labs : Yes             Appointments  past 12m or future 3m with PCP    Date Provider   Last Visit   10/9/2024 Sue Hansen MD   Next Visit   Visit date not found Sue Hansen MD   ED visits in past 90 days: 0        Note composed:4:18 PM 04/28/2025

## 2025-04-28 NOTE — PROGRESS NOTES
The patient location is: home  The chief complaint leading to consultation is: depression, adhd    Visit type: audiovisual    Face to Face time with patient: 17  35 minutes of total time spent on the encounter, which includes face to face time and non-face to face time preparing to see the patient (eg, review of tests), Obtaining and/or reviewing separately obtained history, Documenting clinical information in the electronic or other health record, Independently interpreting results (not separately reported) and communicating results to the patient/family/caregiver, or Care coordination (not separately reported).         Each patient to whom he or she provides medical services by telemedicine is:  (1) informed of the relationship between the physician and patient and the respective role of any other health care provider with respect to management of the patient; and (2) notified that he or she may decline to receive medical services by telemedicine and may withdraw from such care at any time.    Notes:       Outpatient Psychiatry Follow-Up Visit (MD/NP)    4/28/2025    Clinical Status of Patient:  Outpatient (Ambulatory)    Chief Complaint:  Irena Case is a 50 y.o. female who presents today for follow-up of attention problems.  Met with patient.      Interval History and Content of Current Session:  Interim Events/Subjective Report/Content of Current Session:     Patient presents today for follow up.   Past Medical History:   Diagnosis Date    Endometriosis     Fatty liver     Hypertension      Current issues include   Active Problem List with Overview Notes    Diagnosis Date Noted    Anxiousness 07/01/2024    Attention and concentration deficit 02/20/2024    Chronic left hip pain 07/06/2023    Decreased strength, endurance, and mobility 07/06/2023    Postural instability of trunk 07/06/2023    Prolapsing mitral leaflet syndrome 04/18/2023    Migraine headache 04/18/2023     trial of midrin      Menopausal and  female climacteric states 04/18/2023    Astigmatism 04/18/2023    Chronic sinusitis 04/18/2023    Cardiac murmur, unspecified 04/18/2023    Allergic rhinitis 04/18/2023    S/P appendectomy 04/13/2023    Positive ELIJAH (antinuclear antibody) 09/21/2021    Fatty liver 09/14/2021    Hepatic cyst 09/14/2021    Moderate episode of recurrent major depressive disorder 09/08/2021    Hypertension     Endometriosis     Scoliosis deformity of spine 10/31/2018    Tobacco use 10/11/2013    Inattention 10/11/2013     She is doing well on her current medication regimen, denies significant side effects, denies any ah/vh/si/hi. Discussed events in her life, discussed issues involving depressed mood she has struggled with. At this time medications remain effective. Discussed therapy options.         Psychotherapy:  Target symptoms: depression, anxiety   Why chosen therapy is appropriate versus another modality: relevant to diagnosis  Outcome monitoring methods: self-report, observation  Therapeutic intervention type: insight oriented psychotherapy  Topics discussed/themes: building skills sets for symptom management, symptom recognition  The patient's response to the intervention is accepting. The patient's progress toward treatment goals is good.   Duration of intervention: 05 minutes.    Review of Systems   PSYCHIATRIC: Pertinant items are noted in the narrative.    Past Medical, Family and Social History: The patient's past medical, family and social history have been reviewed and updated as appropriate within the electronic medical record - see encounter notes.    Compliance: yes    Side effects: None    Risk Parameters:  Patient reports no suicidal ideation  Patient reports no homicidal ideation  Patient reports no self-injurious behavior  Patient reports no violent behavior    Exam (detailed: at least 9 elements; comprehensive: all 15 elements)   Constitutional  Vitals:  Most recent vital signs, dated less than 90 days prior to  this appointment, were reviewed.   There were no vitals filed for this visit.     General:  unremarkable, age appropriate     Musculoskeletal  Muscle Strength/Tone:  no tremor, no tic   Gait & Station:  non-ataxic     Psychiatric  Speech:  no latency; no press   Mood & Affect:  steady, euthymic  congruent and appropriate   Thought Process:  normal and logical   Associations:  intact   Thought Content:  normal, no suicidality, no homicidality, delusions, or paranoia   Insight:  intact   Judgement: behavior is adequate to circumstances   Orientation:  grossly intact   Memory: intact for content of interview   Language: grossly intact   Attention Span & Concentration:  able to focus   Fund of Knowledge:  intact and appropriate to age and level of education     Assessment and Diagnosis   Status/Progress: Based on the examination today, the patient's problem(s) is/are improved.  New problems have not been presented today.   Co-morbidities, Diagnostic uncertainty, and Lack of compliance are not complicating management of the primary condition.  There are no active rule-out diagnoses for this patient at this time.     General Impression:   1. Attention deficit hyperactivity disorder (ADHD), unspecified ADHD type        2. Mild episode of recurrent major depressive disorder              Intervention/Counseling/Treatment Plan   Medication Management: Continue current medications. The risks and benefits of medication were discussed with the patient.      Labs: reviewed most recent. The treatment plan and follow up plan were reviewed with the patient. Discussed with patient informed consent, risks vs. benefits, alternative treatments, side effect profile and the inherent unpredictability of individual responses to these treatments. The patient expresses understanding of the above and displays the capacity to agree with this current plan and had no other questions. Encouraged Patient to keep future appointments. Take  medications as prescribed and abstain from substance abuse. In the event of an emergency patient was advised to go to the emergency room.    Return to Clinic: 1 month, 3 months, as scheduled, as needed

## 2025-04-28 NOTE — TELEPHONE ENCOUNTER
Care Due:                  Date            Visit Type   Department     Provider  --------------------------------------------------------------------------------                                SAME DAY -   [facility] /  Last Visit: 10-      PATIENT      INTERNAL MED   Sue Hansen  Next Visit: None Scheduled  None         None Found                                                            Last  Test          Frequency    Reason                     Performed    Due Date  --------------------------------------------------------------------------------    CMP.........  12 months..  hydroCHLOROthiazide,       04- 04-                             lisinopriL...............    [employer] Embedded Care Due Messages. Reference number: [id number].   4/28/2025 2:38:34 PM CDT

## 2025-04-30 DIAGNOSIS — I10 HYPERTENSION: ICD-10-CM

## 2025-05-06 ENCOUNTER — PATIENT MESSAGE (OUTPATIENT)
Dept: FAMILY MEDICINE | Facility: CLINIC | Age: 50
End: 2025-05-06
Payer: OTHER GOVERNMENT

## 2025-05-11 ENCOUNTER — PATIENT MESSAGE (OUTPATIENT)
Dept: FAMILY MEDICINE | Facility: CLINIC | Age: 50
End: 2025-05-11
Payer: OTHER GOVERNMENT

## 2025-05-14 ENCOUNTER — OFFICE VISIT (OUTPATIENT)
Dept: FAMILY MEDICINE | Facility: CLINIC | Age: 50
End: 2025-05-14
Payer: OTHER GOVERNMENT

## 2025-05-14 VITALS
HEART RATE: 78 BPM | OXYGEN SATURATION: 99 % | DIASTOLIC BLOOD PRESSURE: 88 MMHG | WEIGHT: 147.25 LBS | SYSTOLIC BLOOD PRESSURE: 128 MMHG | TEMPERATURE: 98 F | RESPIRATION RATE: 16 BRPM | BODY MASS INDEX: 27.8 KG/M2 | HEIGHT: 61 IN

## 2025-05-14 DIAGNOSIS — M41.9 SCOLIOSIS, UNSPECIFIED SCOLIOSIS TYPE, UNSPECIFIED SPINAL REGION: ICD-10-CM

## 2025-05-14 DIAGNOSIS — R20.2 PARESTHESIA AND PAIN OF LEFT EXTREMITY: Primary | ICD-10-CM

## 2025-05-14 DIAGNOSIS — M79.609 PARESTHESIA AND PAIN OF LEFT EXTREMITY: Primary | ICD-10-CM

## 2025-05-14 DIAGNOSIS — I10 PRIMARY HYPERTENSION: ICD-10-CM

## 2025-05-14 PROBLEM — H52.00 HYPEROPIA: Status: ACTIVE | Noted: 2025-05-14

## 2025-05-14 PROCEDURE — 96372 THER/PROPH/DIAG INJ SC/IM: CPT | Mod: PBBFAC,59,PO

## 2025-05-14 PROCEDURE — 99215 OFFICE O/P EST HI 40 MIN: CPT | Mod: PBBFAC,PO | Performed by: NURSE PRACTITIONER

## 2025-05-14 PROCEDURE — 99999 PR PBB SHADOW E&M-EST. PATIENT-LVL V: CPT | Mod: PBBFAC,,, | Performed by: NURSE PRACTITIONER

## 2025-05-14 PROCEDURE — 99214 OFFICE O/P EST MOD 30 MIN: CPT | Mod: S$PBB,,, | Performed by: NURSE PRACTITIONER

## 2025-05-14 PROCEDURE — 99999PBSHW PR PBB SHADOW TECHNICAL ONLY FILED TO HB: Mod: JZ,PBBFAC,,

## 2025-05-14 RX ORDER — GABAPENTIN 300 MG/1
300 CAPSULE ORAL 2 TIMES DAILY
Qty: 60 CAPSULE | Refills: 1 | Status: SHIPPED | OUTPATIENT
Start: 2025-05-14 | End: 2026-05-14

## 2025-05-14 RX ORDER — NABUMENTONE 750 MG/1
750 TABLET ORAL 2 TIMES DAILY
Qty: 60 TABLET | Refills: 1 | Status: SHIPPED | OUTPATIENT
Start: 2025-05-14

## 2025-05-14 RX ORDER — METHYLPREDNISOLONE ACETATE 80 MG/ML
80 INJECTION, SUSPENSION INTRA-ARTICULAR; INTRALESIONAL; INTRAMUSCULAR; SOFT TISSUE
Status: COMPLETED | OUTPATIENT
Start: 2025-05-14 | End: 2025-05-14

## 2025-05-14 RX ORDER — KETOROLAC TROMETHAMINE 30 MG/ML
60 INJECTION, SOLUTION INTRAMUSCULAR; INTRAVENOUS
Status: COMPLETED | OUTPATIENT
Start: 2025-05-14 | End: 2025-05-14

## 2025-05-14 RX ADMIN — KETOROLAC TROMETHAMINE 60 MG: 60 INJECTION, SOLUTION INTRAMUSCULAR at 03:05

## 2025-05-14 RX ADMIN — METHYLPREDNISOLONE ACETATE 80 MG: 80 INJECTION, SUSPENSION INTRALESIONAL; INTRAMUSCULAR; INTRASYNOVIAL; SOFT TISSUE at 03:05

## 2025-05-15 ENCOUNTER — HOSPITAL ENCOUNTER (OUTPATIENT)
Dept: RADIOLOGY | Facility: HOSPITAL | Age: 50
Discharge: HOME | End: 2025-05-15
Attending: NURSE PRACTITIONER
Payer: OTHER GOVERNMENT

## 2025-05-15 DIAGNOSIS — M79.609 PARESTHESIA AND PAIN OF LEFT EXTREMITY: ICD-10-CM

## 2025-05-15 DIAGNOSIS — M41.9 SCOLIOSIS, UNSPECIFIED SCOLIOSIS TYPE, UNSPECIFIED SPINAL REGION: ICD-10-CM

## 2025-05-15 DIAGNOSIS — R20.2 PARESTHESIA AND PAIN OF LEFT EXTREMITY: ICD-10-CM

## 2025-05-15 PROCEDURE — 73521 X-RAY EXAM HIPS BI 2 VIEWS: CPT | Mod: 26,,, | Performed by: RADIOLOGY

## 2025-05-15 PROCEDURE — 73521 X-RAY EXAM HIPS BI 2 VIEWS: CPT | Mod: TC,FY

## 2025-05-15 PROCEDURE — 72114 X-RAY EXAM L-S SPINE BENDING: CPT | Mod: TC,FY

## 2025-05-15 PROCEDURE — 72082 X-RAY EXAM ENTIRE SPI 2/3 VW: CPT | Mod: TC,FY

## 2025-05-15 PROCEDURE — 72082 X-RAY EXAM ENTIRE SPI 2/3 VW: CPT | Mod: 26,,, | Performed by: RADIOLOGY

## 2025-05-16 ENCOUNTER — PATIENT MESSAGE (OUTPATIENT)
Dept: FAMILY MEDICINE | Facility: CLINIC | Age: 50
End: 2025-05-16
Payer: OTHER GOVERNMENT

## 2025-05-16 DIAGNOSIS — M54.9 DORSALGIA, UNSPECIFIED: Primary | ICD-10-CM

## 2025-05-17 ENCOUNTER — LAB VISIT (OUTPATIENT)
Dept: LAB | Facility: HOSPITAL | Age: 50
End: 2025-05-17
Attending: NURSE PRACTITIONER
Payer: OTHER GOVERNMENT

## 2025-05-17 DIAGNOSIS — I10 PRIMARY HYPERTENSION: ICD-10-CM

## 2025-05-17 LAB
ABSOLUTE EOSINOPHIL (OHS): 0.1 K/UL
ABSOLUTE MONOCYTE (OHS): 0.41 K/UL (ref 0.3–1)
ABSOLUTE NEUTROPHIL COUNT (OHS): 2.19 K/UL (ref 1.8–7.7)
ALBUMIN SERPL BCP-MCNC: 4.4 G/DL (ref 3.5–5.2)
ALP SERPL-CCNC: 45 UNIT/L (ref 40–150)
ALT SERPL W/O P-5'-P-CCNC: 21 UNIT/L (ref 10–44)
ANION GAP (OHS): 8 MMOL/L (ref 8–16)
AST SERPL-CCNC: 23 UNIT/L (ref 11–45)
BASOPHILS # BLD AUTO: 0.04 K/UL
BASOPHILS NFR BLD AUTO: 1 %
BILIRUB SERPL-MCNC: 0.5 MG/DL (ref 0.1–1)
BUN SERPL-MCNC: 12 MG/DL (ref 6–20)
CALCIUM SERPL-MCNC: 9.8 MG/DL (ref 8.7–10.5)
CHLORIDE SERPL-SCNC: 100 MMOL/L (ref 95–110)
CHOLEST SERPL-MCNC: 187 MG/DL (ref 120–199)
CHOLEST/HDLC SERPL: 2.3 {RATIO} (ref 2–5)
CO2 SERPL-SCNC: 24 MMOL/L (ref 23–29)
CREAT SERPL-MCNC: 0.8 MG/DL (ref 0.5–1.4)
EAG (OHS): 105 MG/DL (ref 68–131)
ERYTHROCYTE [DISTWIDTH] IN BLOOD BY AUTOMATED COUNT: 12.4 % (ref 11.5–14.5)
GFR SERPLBLD CREATININE-BSD FMLA CKD-EPI: >60 ML/MIN/1.73/M2
GLUCOSE SERPL-MCNC: 107 MG/DL (ref 70–110)
HBA1C MFR BLD: 5.3 % (ref 4–5.6)
HCT VFR BLD AUTO: 37.4 % (ref 37–48.5)
HDLC SERPL-MCNC: 81 MG/DL (ref 40–75)
HDLC SERPL: 43.3 % (ref 20–50)
HGB BLD-MCNC: 12.4 GM/DL (ref 12–16)
IMM GRANULOCYTES # BLD AUTO: 0.01 K/UL (ref 0–0.04)
IMM GRANULOCYTES NFR BLD AUTO: 0.3 % (ref 0–0.5)
LDLC SERPL CALC-MCNC: 95.2 MG/DL (ref 63–159)
LYMPHOCYTES # BLD AUTO: 1.24 K/UL (ref 1–4.8)
MCH RBC QN AUTO: 30.2 PG (ref 27–31)
MCHC RBC AUTO-ENTMCNC: 33.2 G/DL (ref 32–36)
MCV RBC AUTO: 91 FL (ref 82–98)
NONHDLC SERPL-MCNC: 106 MG/DL
NUCLEATED RBC (/100WBC) (OHS): 0 /100 WBC
PLATELET # BLD AUTO: 272 K/UL (ref 150–450)
PMV BLD AUTO: 9.1 FL (ref 9.2–12.9)
POTASSIUM SERPL-SCNC: 4.5 MMOL/L (ref 3.5–5.1)
PROT SERPL-MCNC: 7.6 GM/DL (ref 6–8.4)
RBC # BLD AUTO: 4.11 M/UL (ref 4–5.4)
RELATIVE EOSINOPHIL (OHS): 2.5 %
RELATIVE LYMPHOCYTE (OHS): 31.1 % (ref 18–48)
RELATIVE MONOCYTE (OHS): 10.3 % (ref 4–15)
RELATIVE NEUTROPHIL (OHS): 54.8 % (ref 38–73)
SODIUM SERPL-SCNC: 132 MMOL/L (ref 136–145)
TRIGL SERPL-MCNC: 54 MG/DL (ref 30–150)
TSH SERPL-ACNC: 1.86 UIU/ML (ref 0.4–4)
WBC # BLD AUTO: 3.99 K/UL (ref 3.9–12.7)

## 2025-05-17 PROCEDURE — 83036 HEMOGLOBIN GLYCOSYLATED A1C: CPT

## 2025-05-17 PROCEDURE — 82565 ASSAY OF CREATININE: CPT

## 2025-05-17 PROCEDURE — 84443 ASSAY THYROID STIM HORMONE: CPT

## 2025-05-17 PROCEDURE — 85025 COMPLETE CBC W/AUTO DIFF WBC: CPT

## 2025-05-17 PROCEDURE — 36415 COLL VENOUS BLD VENIPUNCTURE: CPT

## 2025-05-17 PROCEDURE — 83718 ASSAY OF LIPOPROTEIN: CPT

## 2025-05-18 ENCOUNTER — PATIENT MESSAGE (OUTPATIENT)
Dept: PSYCHIATRY | Facility: CLINIC | Age: 50
End: 2025-05-18
Payer: OTHER GOVERNMENT

## 2025-05-19 ENCOUNTER — PATIENT MESSAGE (OUTPATIENT)
Dept: FAMILY MEDICINE | Facility: CLINIC | Age: 50
End: 2025-05-19
Payer: OTHER GOVERNMENT

## 2025-05-19 NOTE — PROGRESS NOTES
"Subjective:      Patient ID: Irena Case is a 50 y.o. female.  Pt with hx of scoliosis returns to clinic with acutely worsening left hip and leg pain after lifting boxes while at work a few days ago. Reports burning shooting pain down left leg, not improved with NSAIDs and muscle relaxers.       Review of Systems   Constitutional:  Negative for activity change, appetite change, fever, night sweats and unexpected weight change.   Eyes:  Negative for pain and visual disturbance.   Respiratory:  Negative for chest tightness and shortness of breath.    Cardiovascular:  Negative for chest pain, palpitations and leg swelling.   Gastrointestinal:  Negative for abdominal pain, change in bowel habit, constipation, diarrhea, nausea and vomiting.   Genitourinary:  Negative for difficulty urinating, dysuria and menstrual problem.   Musculoskeletal:  Positive for arthralgias, back pain, gait problem, leg pain, myalgias, neck pain, neck stiffness and joint deformity. Negative for joint swelling.   Integumentary:  Negative for color change and rash.   Neurological:  Positive for weakness and numbness. Negative for dizziness, vertigo, tremors, seizures, syncope, facial asymmetry, speech difficulty, light-headedness, headaches, coordination difficulties and memory loss.   All other systems reviewed and are negative.        Objective:     Vitals:    05/14/25 1453   BP: 128/88   Pulse: 78   Resp: 16   Temp: 97.8 °F (36.6 °C)   TempSrc: Oral   SpO2: 99%   Weight: 66.8 kg (147 lb 4.3 oz)   Height: 5' 1" (1.549 m)     Physical Exam  Vitals and nursing note reviewed.   Constitutional:       General: She is not in acute distress.     Appearance: Normal appearance. She is well-developed and well-groomed. She is not ill-appearing.   HENT:      Head: Normocephalic and atraumatic.      Right Ear: External ear normal.      Left Ear: External ear normal.      Nose: Nose normal.      Mouth/Throat:      Lips: Pink.      Mouth: Mucous membranes " are moist.   Eyes:      General: Lids are normal. Vision grossly intact. Gaze aligned appropriately.      Conjunctiva/sclera: Conjunctivae normal.      Pupils: Pupils are equal, round, and reactive to light.   Neck:      Trachea: Phonation normal.   Cardiovascular:      Rate and Rhythm: Normal rate and regular rhythm.      Heart sounds: Normal heart sounds.   Pulmonary:      Effort: Pulmonary effort is normal. No accessory muscle usage or respiratory distress.      Breath sounds: Normal breath sounds and air entry.   Abdominal:      General: Abdomen is flat. Bowel sounds are normal. There is no distension.      Palpations: Abdomen is soft.      Tenderness: There is no abdominal tenderness.   Musculoskeletal:      Cervical back: Neck supple.      Thoracic back: Deformity and spasms present. No swelling, edema, signs of trauma, lacerations, tenderness or bony tenderness. Decreased range of motion. Scoliosis present.      Lumbar back: Deformity, spasms, tenderness and bony tenderness present. No swelling, edema, signs of trauma or lacerations. Decreased range of motion. Positive left straight leg raise test. Negative right straight leg raise test. Scoliosis present.      Right hip: Normal.      Left hip: Tenderness present. No deformity, lacerations, bony tenderness or crepitus. Decreased range of motion. Decreased strength.      Right upper leg: Normal.      Left upper leg: Normal.      Right lower leg: Normal. No edema.      Left lower leg: Normal. No edema.   Skin:     General: Skin is warm and dry.      Findings: No rash.   Neurological:      General: No focal deficit present.      Mental Status: She is alert and oriented to person, place, and time. Mental status is at baseline.      Sensory: Sensation is intact.      Motor: Motor function is intact.      Coordination: Coordination is intact.      Gait: Gait abnormal (antalgic limp).   Psychiatric:         Attention and Perception: Attention and perception normal.          Mood and Affect: Mood and affect normal.         Speech: Speech normal.         Behavior: Behavior normal. Behavior is cooperative.         Thought Content: Thought content normal.         Cognition and Memory: Cognition and memory normal.         Judgment: Judgment normal.       Assessment and Plan:     1. Paresthesia and pain of left extremity (Primary)  Suspect sciatica vs nerve compression due to congenital deformity.   Natural history and expected course discussed. Questions answered.  Short (2-4 day) period of relative rest recommended until acute symptoms improve.  Ice to affected area as needed for local pain relief.  Heat to affected area as needed for local pain relief.  NSAIDs per medication orders.  OTC analgesics as needed.  Further orders pending images  - X-Ray Lumbar Complete Including Flex And Ext; Future  - X-Ray Scoliosis Complete; Future  - nabumetone (RELAFEN) 750 MG tablet; Take 1 tablet (750 mg total) by mouth 2 (two) times daily.  Dispense: 60 tablet; Refill: 1  - gabapentin (NEURONTIN) 300 MG capsule; Take 1 capsule (300 mg total) by mouth 2 (two) times daily.  Dispense: 60 capsule; Refill: 1  - ketorolac injection 60 mg  - methylPREDNISolone acetate injection 80 mg  - X-Ray Hips Bilateral 2 View Incl AP Pelvis; Future    2. Scoliosis, unspecified scoliosis type, unspecified spinal region  Treatment options discussed including Medication options discussed and recommended., Exercise options discussed and encouraged, Activity modification discussed and recommended, recommended PT if fails to improve or worsen.  Continue with existing conservative treatment program.  - X-Ray Lumbar Complete Including Flex And Ext; Future  - X-Ray Scoliosis Complete; Future  - nabumetone (RELAFEN) 750 MG tablet; Take 1 tablet (750 mg total) by mouth 2 (two) times daily.  Dispense: 60 tablet; Refill: 1  - gabapentin (NEURONTIN) 300 MG capsule; Take 1 capsule (300 mg total) by mouth 2 (two) times daily.   Dispense: 60 capsule; Refill: 1  - ketorolac injection 60 mg  - methylPREDNISolone acetate injection 80 mg  - X-Ray Hips Bilateral 2 View Incl AP Pelvis; Future    3. Primary hypertension  Continue current treatment regimen.  Dietary sodium restriction.  Regular aerobic exercise.  Weight loss.  Stop smoking.  Reduce stress: relax over summer break.  Patient Education: Reviewed risks of hypertension and principles of treatment.  - CBC Auto Differential; Future  - Comprehensive Metabolic Panel; Future  - Hemoglobin A1C; Future  - Lipid Panel; Future  - TSH; Future           JULIA Rodriguez, FNP-C  Family/Internal Medicine  [facility]

## 2025-05-22 ENCOUNTER — PATIENT MESSAGE (OUTPATIENT)
Dept: FAMILY MEDICINE | Facility: CLINIC | Age: 50
End: 2025-05-22
Payer: OTHER GOVERNMENT

## 2025-05-22 ENCOUNTER — HOSPITAL ENCOUNTER (OUTPATIENT)
Dept: RADIOLOGY | Facility: HOSPITAL | Age: 50
Discharge: HOME | End: 2025-05-22
Attending: NURSE PRACTITIONER
Payer: OTHER GOVERNMENT

## 2025-05-22 DIAGNOSIS — M79.609 PARESTHESIA AND PAIN OF LEFT EXTREMITY: Primary | ICD-10-CM

## 2025-05-22 DIAGNOSIS — M41.9 SEVERE SCOLIOSIS: ICD-10-CM

## 2025-05-22 DIAGNOSIS — M54.9 DORSALGIA, UNSPECIFIED: ICD-10-CM

## 2025-05-22 DIAGNOSIS — R20.2 PARESTHESIA AND PAIN OF LEFT EXTREMITY: Primary | ICD-10-CM

## 2025-05-22 DIAGNOSIS — M41.9 SCOLIOSIS, UNSPECIFIED SCOLIOSIS TYPE, UNSPECIFIED SPINAL REGION: ICD-10-CM

## 2025-05-22 PROCEDURE — 72132 CT LUMBAR SPINE W/DYE: CPT | Mod: TC

## 2025-05-22 PROCEDURE — 72132 CT LUMBAR SPINE W/DYE: CPT | Mod: 26,,, | Performed by: RADIOLOGY

## 2025-05-22 PROCEDURE — 25500020 PHARM REV CODE 255: Performed by: NURSE PRACTITIONER

## 2025-05-22 RX ADMIN — IOHEXOL 75 ML: 350 INJECTION, SOLUTION INTRAVENOUS at 01:05

## 2025-05-26 ENCOUNTER — OFFICE VISIT (OUTPATIENT)
Dept: PSYCHIATRY | Facility: CLINIC | Age: 50
End: 2025-05-26
Payer: OTHER GOVERNMENT

## 2025-05-26 DIAGNOSIS — F90.9 ATTENTION DEFICIT HYPERACTIVITY DISORDER (ADHD), UNSPECIFIED ADHD TYPE: ICD-10-CM

## 2025-05-26 PROCEDURE — G2211 COMPLEX E/M VISIT ADD ON: HCPCS | Mod: 95,,,

## 2025-05-26 PROCEDURE — 98006 SYNCH AUDIO-VIDEO EST MOD 30: CPT | Mod: 95,,,

## 2025-05-26 RX ORDER — DEXTROAMPHETAMINE SACCHARATE, AMPHETAMINE ASPARTATE MONOHYDRATE, DEXTROAMPHETAMINE SULFATE AND AMPHETAMINE SULFATE 5; 5; 5; 5 MG/1; MG/1; MG/1; MG/1
20 CAPSULE, EXTENDED RELEASE ORAL 2 TIMES DAILY
Qty: 60 CAPSULE | Refills: 0 | Status: SHIPPED | OUTPATIENT
Start: 2025-05-26

## 2025-05-26 NOTE — PROGRESS NOTES
The patient location is: [place]  The chief complaint leading to consultation is: ADHD    Visit type: audiovisual    Face to Face time with patient: 20  35 minutes of total time spent on the encounter, which includes face to face time and non-face to face time preparing to see the patient (eg, review of tests), Obtaining and/or reviewing separately obtained history, Documenting clinical information in the electronic or other health record, Independently interpreting results (not separately reported) and communicating results to the patient/family/caregiver, or Care coordination (not separately reported).         Each patient to whom he or she provides medical services by telemedicine is:  (1) informed of the relationship between the physician and patient and the respective role of any other health care provider with respect to management of the patient; and (2) notified that he or she may decline to receive medical services by telemedicine and may withdraw from such care at any time.    Notes:             Outpatient Psychiatry Follow-Up Visit (MD/NP)    5/26/2025    Clinical Status of Patient:  Outpatient (Ambulatory)    Chief Complaint:  Irena Case is a 50 y.o. female who presents today for follow-up of attention problems.  Met with patient.      Interval History and Content of Current Session:  Interim Events/Subjective Report/Content of Current Session:     Patient presents for follow up. Is doing well on current dose of adderall XR, however feels it wears off quickly throughout her day. Discussed options. Goals continue to be to complete work/school assignments on time, stay on task without frequent reminders, less frequent interruptions of others, remain focused during conversations with others as evidenced by self-report and observation. At this time denies any ah/vh/si/hi, denies any side effects of medication. Will trial an additional dose to take throughout her day. She is also taking tramadol, advised  her to not take it at the same time as her stimulant medication. She verbalizes understanding      Psychotherapy:  Target symptoms: distractability, lack of focus  Why chosen therapy is appropriate versus another modality: relevant to diagnosis  Outcome monitoring methods: self-report, observation  Therapeutic intervention type: insight oriented psychotherapy  Topics discussed/themes: building skills sets for symptom management, symptom recognition  The patient's response to the intervention is accepting. The patient's progress toward treatment goals is good.   Duration of intervention: 10 minutes.    Review of Systems   PSYCHIATRIC: Pertinant items are noted in the narrative.    Past Medical, Family and Social History: The patient's past medical, family and social history have been reviewed and updated as appropriate within the electronic medical record - see encounter notes.    Compliance: yes    Side effects: None    Risk Parameters:  Patient reports no suicidal ideation  Patient reports no homicidal ideation  Patient reports no self-injurious behavior  Patient reports no violent behavior    Exam (detailed: at least 9 elements; comprehensive: all 15 elements)   Constitutional  Vitals:  Most recent vital signs, dated less than 90 days prior to this appointment, were reviewed.   There were no vitals filed for this visit.     General:  unremarkable, age appropriate     Musculoskeletal  Muscle Strength/Tone:  no tremor, no tic   Gait & Station:  non-ataxic     Psychiatric  Speech:  no latency; no press   Mood & Affect:  steady, euthymic  congruent and appropriate   Thought Process:  normal and logical   Associations:  intact   Thought Content:  normal, no suicidality, no homicidality, delusions, or paranoia   Insight:  intact   Judgement: behavior is adequate to circumstances   Orientation:  grossly intact   Memory: intact for content of interview   Language: grossly intact   Attention Span & Concentration:  able to  focus   Fund of Knowledge:  intact and appropriate to age and level of education     Assessment and Diagnosis   Status/Progress: Based on the examination today, the patient's problem(s) is/are improved and adequately but not ideally controlled.  New problems have not been presented today.   Co-morbidities, Diagnostic uncertainty, and Lack of compliance are complicating management of the primary condition.  There are no active rule-out diagnoses for this patient at this time.     General Impression:   1. Attention deficit hyperactivity disorder (ADHD), unspecified ADHD type  dextroamphetamine-amphetamine (ADDERALL XR) 20 MG 24 hr capsule            Intervention/Counseling/Treatment Plan   Medication Management: Continue current medications. The risks and benefits of medication were discussed with the patient.      Labs: reviewed most recent. The treatment plan and follow up plan were reviewed with the patient. Discussed with patient informed consent, risks vs. benefits, alternative treatments, side effect profile and the inherent unpredictability of individual responses to these treatments. The patient expresses understanding of the above and displays the capacity to agree with this current plan and had no other questions. Encouraged Patient to keep future appointments. Take medications as prescribed and abstain from substance abuse. In the event of an emergency patient was advised to go to the emergency room.    Return to Clinic: 1 month, 3 months, 6 months, as scheduled, as needed

## 2025-05-27 ENCOUNTER — OFFICE VISIT (OUTPATIENT)
Dept: NEUROSURGERY | Facility: CLINIC | Age: 50
End: 2025-05-27
Payer: OTHER GOVERNMENT

## 2025-05-27 ENCOUNTER — PATIENT MESSAGE (OUTPATIENT)
Dept: NEUROSURGERY | Facility: CLINIC | Age: 50
End: 2025-05-27

## 2025-05-27 VITALS — WEIGHT: 143.63 LBS | BODY MASS INDEX: 27.14 KG/M2

## 2025-05-27 DIAGNOSIS — M79.609 PARESTHESIA AND PAIN OF LEFT EXTREMITY: ICD-10-CM

## 2025-05-27 DIAGNOSIS — M41.9 SEVERE SCOLIOSIS: Primary | ICD-10-CM

## 2025-05-27 DIAGNOSIS — R20.2 PARESTHESIA AND PAIN OF LEFT EXTREMITY: ICD-10-CM

## 2025-05-27 PROCEDURE — 99203 OFFICE O/P NEW LOW 30 MIN: CPT | Mod: S$PBB,,, | Performed by: STUDENT IN AN ORGANIZED HEALTH CARE EDUCATION/TRAINING PROGRAM

## 2025-05-27 PROCEDURE — 99999 PR PBB SHADOW E&M-EST. PATIENT-LVL III: CPT | Mod: PBBFAC,,, | Performed by: STUDENT IN AN ORGANIZED HEALTH CARE EDUCATION/TRAINING PROGRAM

## 2025-05-27 PROCEDURE — 99213 OFFICE O/P EST LOW 20 MIN: CPT | Mod: PBBFAC | Performed by: STUDENT IN AN ORGANIZED HEALTH CARE EDUCATION/TRAINING PROGRAM

## 2025-05-27 NOTE — PROGRESS NOTES
Neurosurgery  History & Physical    SUBJECTIVE:     Chief Complaint: LLE radicular pain    History of Present Illness:  50 F who vapes presents for eval of progressively worsening LLE radicular pain.  Pain initially started as left hip pain about 1 year ago and then progressed to left hip and buttock pain.  More recently she has developed pain which radiates down her left leg into lateral calf.  She also has noticed some mild weakness in her left foot.  She has mild to moderate back pain with heavy lifting but overall her LLE pain is more severe.  She tried a few rounds of PT and hasn't had any spinal injections.    Review of patient's allergies indicates:  No Known Allergies    Current Medications[1]    Past Medical History:   Diagnosis Date    Endometriosis     Fatty liver     Hypertension      Past Surgical History:   Procedure Laterality Date    APPENDECTOMY      BREAST BIOPSY Left     benign    COLONOSCOPY N/A 01/26/2024    Procedure: COLONOSCOPY;  Surgeon: Abi Mena MD;  Location: [facility] ENDO;  Service: Endoscopy;  Laterality: N/A;  10/9 ref by Sue Hansen MD, PEG, instr. to portal-st  1/19- lvm and portal msg for pc. DBM    EYE SURGERY      LAPAROSCOPIC APPENDECTOMY N/A 04/13/2023    Procedure: APPENDECTOMY, LAPAROSCOPIC;  Surgeon: Niall Benjamin MD;  Location: [facility] OR;  Service: General;  Laterality: N/A;    LASIK Bilateral     TOTAL ABDOMINAL HYSTERECTOMY W/ BILATERAL SALPINGOOPHORECTOMY  2020    Supracervical - done in [facility]     Family History       Problem Relation (Age of Onset)    Arthritis Mother    Asthma Mother    COPD Mother    Heart disease Father    Heart failure Mother    Hypertension Mother          Social History     Socioeconomic History    Marital status:    Tobacco Use    Smoking status: Every Day     Types: Vaping with nicotine    Smokeless tobacco: Never   Substance and Sexual Activity    Alcohol use: Yes     Alcohol/week: 2.0 standard drinks of alcohol     Types: 2  Glasses of wine per week     Comment: daily    Drug use: Never   Social History Narrative    ** Merged History Encounter **          Social Drivers of Health     Financial Resource Strain: Low Risk  (2/5/2025)    Overall Financial Resource Strain (CARDIA)     Difficulty of Paying Living Expenses: Not hard at all   Food Insecurity: No Food Insecurity (2/5/2025)    Hunger Vital Sign     Worried About Running Out of Food in the Last Year: Never true     Ran Out of Food in the Last Year: Never true   Transportation Needs: No Transportation Needs (1/11/2024)    PRAPARE - Transportation     Lack of Transportation (Medical): No     Lack of Transportation (Non-Medical): No   Physical Activity: Inactive (2/5/2025)    Exercise Vital Sign     Days of Exercise per Week: 0 days     Minutes of Exercise per Session: 0 min   Stress: Stress Concern Present (2/5/2025)    [employer] - Occupational Stress Questionnaire     Feeling of Stress : Very much   Housing Stability: Low Risk  (1/11/2024)    Housing Stability Vital Sign     Unable to Pay for Housing in the Last Year: No     Number of Places Lived in the Last Year: 1     Unstable Housing in the Last Year: No       Review of Systems  14 point ROS was negative    OBJECTIVE:     Vital Signs  Pain Score:   3  Weight: 65.2 kg (143 lb 10.1 oz)  Body mass index is 27.14 kg/m².      Physical Exam:    Constitutional: She appears well-developed and well-nourished.     Eyes: Pupils are equal, round, and reactive to light.     Cardiovascular: Normal rate and regular rhythm.     Abdominal: Soft.     Psych/Behavior: She is alert. She is oriented to person, place, and time. She has a normal mood and affect.     Musculoskeletal: Gait is normal.        Neck: Range of motion is limited.        Back: Range of motion is limited.     Neurological:        Coordination: She has a normal Romberg Test and normal tandem walking coordination.        Sensory: There is no sensory  deficit in the trunk. There is no sensory deficit in the extremities.        DTRs: DTRs are DTRS NORMAL AND SYMMETRICnormal and symmetric.        Cranial nerves: Cranial nerve(s) II, III, IV, V, VI, VII, VIII, IX, X, XI and XII are intact.     5/5 in BUE  5/5 in BLE except 4+/5 in left DF/EHL    No chang's bilaterally    Negative SHARIF at left hip    Diagnostic Results:  Flex/ex L: no instability  Scoli: lumbar levoscoliosis, no mismatch/imbalance  CT L: severe left L4/5 NF stenosis.  Vacuum disc at L3/4, 4/5.  Reviewed    ASSESSMENT/PLAN:     50 F presents for eval of left buttock/hip pain and left LE radicular pain in L5 distribution.  Her imaging is described above.  Will plan to get MRI L and have her established with pain mgmt.  Fu after completion.           [1]   Current Outpatient Medications   Medication Sig Dispense Refill    azelastine (ASTELIN) 137 mcg (0.1 %) nasal spray 1 spray (137 mcg total) by Nasal route 2 (two) times daily. 30 mL 1    dextroamphetamine-amphetamine (ADDERALL XR) 20 MG 24 hr capsule Take 1 capsule (20 mg total) by mouth 2 (two) times a day. 60 capsule 0    EScitalopram oxalate (LEXAPRO) 20 MG tablet TAKE ONE TABLET BY MOUTH ONCE DAILY FOR MOOD Strength: 20 mg 90 tablet 3    fluticasone propionate (FLONASE) 50 mcg/actuation nasal spray SPRAY 2 SPRAYS BY EACH NOSTRIL ROUTE ONCE DAILY. 16 mL 1    gabapentin (NEURONTIN) 300 MG capsule Take 1 capsule (300 mg total) by mouth 2 (two) times daily. 60 capsule 1    hydroCHLOROthiazide (HYDRODIURIL) 25 MG tablet TAKE ONE TABLET BY MOUTH EVERY DAY FOR FLUID AND BLOOD PRESSURE 90 tablet 1    lisinopriL (PRINIVIL,ZESTRIL) 40 MG tablet TAKE 1 TABLET BY MOUTH EVERY DAY 90 tablet 1    MULTIVITAMIN ORAL Take by mouth.      nabumetone (RELAFEN) 750 MG tablet Take 1 tablet (750 mg total) by mouth 2 (two) times daily. 60 tablet 1    traMADoL (ULTRAM) 50 mg tablet Take 1 tablet (50 mg total) by mouth every 6 (six) hours as needed for Pain. 30 each 0      No current facility-administered medications for this visit.

## 2025-06-02 ENCOUNTER — HOSPITAL ENCOUNTER (OUTPATIENT)
Dept: RADIOLOGY | Facility: HOSPITAL | Age: 50
Discharge: HOME | End: 2025-06-02
Attending: STUDENT IN AN ORGANIZED HEALTH CARE EDUCATION/TRAINING PROGRAM
Payer: OTHER GOVERNMENT

## 2025-06-02 DIAGNOSIS — M41.9 SEVERE SCOLIOSIS: ICD-10-CM

## 2025-06-02 PROCEDURE — 72148 MRI LUMBAR SPINE W/O DYE: CPT | Mod: 26,,, | Performed by: RADIOLOGY

## 2025-06-02 PROCEDURE — 72148 MRI LUMBAR SPINE W/O DYE: CPT | Mod: TC

## 2025-06-03 ENCOUNTER — PATIENT MESSAGE (OUTPATIENT)
Dept: FAMILY MEDICINE | Facility: CLINIC | Age: 50
End: 2025-06-03
Payer: OTHER GOVERNMENT

## 2025-06-20 ENCOUNTER — OFFICE VISIT (OUTPATIENT)
Dept: PAIN MEDICINE | Facility: CLINIC | Age: 50
End: 2025-06-20
Payer: OTHER GOVERNMENT

## 2025-06-20 VITALS
DIASTOLIC BLOOD PRESSURE: 74 MMHG | SYSTOLIC BLOOD PRESSURE: 132 MMHG | HEART RATE: 90 BPM | HEIGHT: 61 IN | RESPIRATION RATE: 19 BRPM | OXYGEN SATURATION: 100 % | WEIGHT: 142.44 LBS | BODY MASS INDEX: 26.89 KG/M2

## 2025-06-20 DIAGNOSIS — F33.1 MODERATE EPISODE OF RECURRENT MAJOR DEPRESSIVE DISORDER: ICD-10-CM

## 2025-06-20 DIAGNOSIS — M41.9 SEVERE SCOLIOSIS: ICD-10-CM

## 2025-06-20 DIAGNOSIS — M54.16 LUMBAR RADICULOPATHY: Primary | ICD-10-CM

## 2025-06-20 DIAGNOSIS — M51.362 DEGENERATION OF INTERVERTEBRAL DISC OF LUMBAR REGION WITH DISCOGENIC BACK PAIN AND LOWER EXTREMITY PAIN: ICD-10-CM

## 2025-06-20 PROCEDURE — 99999 PR PBB SHADOW E&M-EST. PATIENT-LVL IV: CPT | Mod: PBBFAC,,, | Performed by: STUDENT IN AN ORGANIZED HEALTH CARE EDUCATION/TRAINING PROGRAM

## 2025-06-20 PROCEDURE — 99214 OFFICE O/P EST MOD 30 MIN: CPT | Mod: PBBFAC,PN | Performed by: STUDENT IN AN ORGANIZED HEALTH CARE EDUCATION/TRAINING PROGRAM

## 2025-06-20 RX ORDER — ESCITALOPRAM OXALATE 20 MG/1
TABLET ORAL
Qty: 90 TABLET | Refills: 1 | Status: SHIPPED | OUTPATIENT
Start: 2025-06-20

## 2025-06-20 RX ORDER — PREGABALIN 50 MG/1
50 CAPSULE ORAL NIGHTLY
Qty: 30 CAPSULE | Refills: 2 | Status: SHIPPED | OUTPATIENT
Start: 2025-06-20 | End: 2025-09-18

## 2025-06-20 NOTE — PROGRESS NOTES
Chronic Pain - New Consult    Referring Physician: Apollo Nolen DO    Date: 06/20/2025     Re: Irena Case  MR#: [id number]  YOB: 1975  Age: 50 y.o.    Chief Complaint:   Chief Complaint   Patient presents with    Back Pain     **This note is dictated using the M*Modal Fluency Direct word recognition program. There are word recognition mistakes that are occasionally missed on review. This note was generated with the assistance of ambient listening technology. Verbal consent was obtained by the patient and accompanying visitor(s) for the recording of patient appointment to facilitate this note. I attest to having reviewed and edited the generated note for accuracy, though some syntax or spelling errors may persist. Please contact the author of this note for any clarification.   **    ASSESSMENT: 50 y.o. year old female with left back/leg pain, consistent with     1. Lumbar radiculopathy  Ambulatory Referral/Consult to Physical Therapy    pregabalin (LYRICA) 50 MG capsule    Case Request Endoscopy: Left L4 + L5 Transforaminal Epidural Steroid Injections (ref: Hanyu)      2. Severe scoliosis  Ambulatory referral/consult to Pain Clinic    Ambulatory Referral/Consult to Physical Therapy    pregabalin (LYRICA) 50 MG capsule      3. Degeneration of intervertebral disc of lumbar region with discogenic back pain and lower extremity pain  Case Request Endoscopy: Left L4 + L5 Transforaminal Epidural Steroid Injections (ref: Hanyu)        **Assessment & Plan      LUMBAR RADICULOPATHY:  - Trial Pregabalin 50mg  - Discussed epidural injection for radicular pain.  - Explained injection site, medication (steroid and numbing medicine), expected outcomes, risks.  - Goal: 50% improvement in pain, lasting 3-6 months on average.  - Scheduled for epidural injection with Dr. Suarez next Wednesday, pending insurance approval.   - 6/24/25 - Left L4-5, L5-S1 TFALLY w/ Daniela- RN sed    Left leg weakness:  - Be  cautious of potential foot drop and tripping due to weakness in left foot.  - Referred to PT for strengthening exercises.    UNEQUAL LIMB LENGTH (ACQUIRED):  - Try shoe lift for left side to address leg length discrepancy.    - RTC after tfESI  - Counseled patient regarding the importance of weight loss and activity modification and physical therapy.    The above plan and management options were discussed at length with patient. Patient is in agreement with the above and verbalized understanding. It will be communicated with the referring physician via electronic record, fax, or mail.  Lab/study reports reviewed were important and necessary because subsequent medical and treatment recommendations required review of the above lab/study reports. Images viewed/reviewed above were important and necessary because subsequent medical and treatment recommendations required review of the reviewed image(s).     Electronically signed by:  Nakul Michael DO  06/20/2025    =========================================================================================================    SUBJECTIVE:    Irena Case is a 50 y.o. female presents to the clinic for the evaluation of back and leg pain.  Patient presents with left leg radicular pain that started approximately a month and a half ago, towards the end of the school year (late April or early May). Pain extends from her hip down to her ankle, primarily along the back of the thigh and into the foot, described as burning and shooting in nature. It is continuous when present, with good and bad days, worsening with walking and sitting, and occasionally waking her up at night. She cannot identify specific positions that alleviate or exacerbate the pain.    She reports weakness in the left leg, particularly noticeable during prolonged walking and when getting out of the shower without support. For pain management, she takes Nabumetone twice daily. Missing a dose led to  "increased pain for two days during a teaching conference in [place]. She has been prescribed gabapentin but does not take it due to excessive fatigue. Previous steroid injections were ineffective.    She experienced hip pain about a year ago and has scoliosis, which contributes to uneven hips. As a fifth-, she notes that the pain was particularly bad during the last few weeks of school. She typically sleeps 5-6 hours per night during the school year and 7-8 hours during the summer.    She denies any recent trauma or injury, urinary or bowel incontinence, or falls. She denies any history of back surgery or joint replacements.    PREVIOUS TREATMENTS:  Patient previously underwent physical therapy, which was not very helpful and was performed "a while ago". She also received steroid injections into the muscle without X-ray guidance, which did not provide any benefit.    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 Her leg pain started towards the end of the school year, around late April or early May. The pain was severe during the last few weeks of school, affecting her mobility and making prolonged walking difficult. She attended a teaching conference in [place] when the pain was particularly bad, challenging her ability to walk.     Pain Description:    At BEST  2/10   At WORST  9/10 on the WORST day.    On average pain is rated as 2/10.   Today the pain is rated as 3/10  The pain is intermittent.  The pain is described as burning and shooting    Symptoms interfere with daily activity and sleeping.   Exacerbating factors: not sure.    Mitigating factors nothing.   She reports 5-7 hours of sleep per night.    Physical Therapy/Home Exercise: No, not currently in physical therapy or home exercise program    Current Pain Medications:    - nabumetone BID    Failed Pain Medications:    - gabapentin (too much sedation)    Pain " Treatment Therapies:    Pain procedures:   none  Physical Therapy: years ago  Chiropractor: none  Acupuncture: none  TENS unit: none  Spinal decompression: none  Joint replacement: none    Patient reports significant motor weakness in the left leg  Patient denies any suicidal or homicidal ideations     report:  Reviewed and consistent with medication use as prescribed.    Imaging:   LUMBAR MRI  Results for orders placed during the hospital encounter of 06/02/25    MRI Lumbar Spine Without Contrast    Narrative  EXAMINATION:  MRI LUMBAR SPINE WITHOUT CONTRAST    CLINICAL HISTORY:  severe NF stenosis L4/5 left; Scoliosis, unspecified    TECHNIQUE:  Multiplanar, multisequence MR images were acquired from the thoracolumbar junction to the sacrum without the administration of contrast.    COMPARISON:  CT 05/22/2025.    FINDINGS:  Lower lumbar dextroscoliosis with 7 mm of left lateral listhesis of L3 on L4.  No spondylolysis.  Vertebral body heights are well maintained.  No acute fractures.  No marrow signal abnormality to suggest an infiltrative process.    Multilevel degenerative disc space narrowing desiccation most pronounced at L3-L4 and L4-L5.  Chronic degenerative endplate change at L3-L4 and L4-L5.  No endplate edema.    Distal spinal cord demonstrates normal contour and signal intensity.  Conus medullaris terminates at L1-L2.  Cauda equina is normal.  Right L1-L2 and multiple sacral perineural sleeve cysts.    Visualized intra-abdominal structures demonstrate no significant abnormalities.  Paraspinal musculature demonstrates normal bulk and signal intensity.  SI joints are symmetric.    T11-T12: No spinal canal stenosis or neural foraminal narrowing.    T12-L1: No spinal canal stenosis or neural foraminal narrowing.    L1-L2: No spinal canal stenosis or neural foraminal narrowing.    L2-L3: No spinal canal stenosis or neural foraminal narrowing.    L3-L4: Circumferential disc bulge.  Mild bilateral facet  arthropathy.  No spinal canal stenosis.  Mild left and moderate right neural foraminal narrowing.    L4-L5: Circumferential disc bulge.  Mild left facet arthropathy.  No spinal canal stenosis.  Severe left neural foraminal narrowing.    L5-S1: Left foraminal/extraforaminal broad-based disc bulge.  Mild bilateral facet arthropathy.  No spinal canal stenosis.  Mild left neural foraminal narrowing.    Impression  1. Multilevel lumbar spondylosis most pronounced at L3-L4 and L4-L5 as detailed above.      Electronically signed by: Khalif Gaviria MD  Date:    06/03/2025  Time:    11:44            6/20/2025     9:24 AM   Pain Disability Index (PDI)   Family/Home Responsibilities: 9   Recreation: 9   Occupation: 9   Sexual Behavior: 4   Self Care: 4   Life-Support Activities: 1   Pain Disability Index (PDI) 42        Past Medical History:   Diagnosis Date    Endometriosis     Fatty liver     Hypertension      Past Surgical History:   Procedure Laterality Date    APPENDECTOMY      BREAST BIOPSY Left     benign    COLONOSCOPY N/A 01/26/2024    Procedure: COLONOSCOPY;  Surgeon: Abi Mena MD;  Location: [facility] ENDO;  Service: Endoscopy;  Laterality: N/A;  10/9 ref by Sue Hansen MD, PEG, instr. to portal-st  1/19- lvm and portal msg for pc. DBM    EYE SURGERY      LAPAROSCOPIC APPENDECTOMY N/A 04/13/2023    Procedure: APPENDECTOMY, LAPAROSCOPIC;  Surgeon: Niall Benjamin MD;  Location: [facility] OR;  Service: General;  Laterality: N/A;    LASIK Bilateral     TOTAL ABDOMINAL HYSTERECTOMY W/ BILATERAL SALPINGOOPHORECTOMY  2020    Supracervical - done in [facility]     Social History[1]  Family History   Problem Relation Name Age of Onset    COPD Mother Kaitlynn     Asthma Mother Kaitlynn     Hypertension Mother Kaitlynn     Heart failure Mother Kaitlynn     Arthritis Mother Kaitlynn     Heart disease Father Luis         s/p quadruple bypass    Cirrhosis Neg Hx      Breast cancer Neg Hx      Ovarian cancer Neg Hx      Colon cancer Neg Hx          Review of patient's allergies indicates:  No Known Allergies    Current Outpatient Medications   Medication Sig    azelastine (ASTELIN) 137 mcg (0.1 %) nasal spray 1 spray (137 mcg total) by Nasal route 2 (two) times daily.    dextroamphetamine-amphetamine (ADDERALL XR) 20 MG 24 hr capsule Take 1 capsule (20 mg total) by mouth 2 (two) times a day.    EScitalopram oxalate (LEXAPRO) 20 MG tablet TAKE ONE TABLET BY MOUTH ONCE DAILY FOR MOOD Strength: 20 mg    fluticasone propionate (FLONASE) 50 mcg/actuation nasal spray SPRAY 2 SPRAYS BY EACH NOSTRIL ROUTE ONCE DAILY.    hydroCHLOROthiazide (HYDRODIURIL) 25 MG tablet TAKE ONE TABLET BY MOUTH EVERY DAY FOR FLUID AND BLOOD PRESSURE    lisinopriL (PRINIVIL,ZESTRIL) 40 MG tablet TAKE 1 TABLET BY MOUTH EVERY DAY    MULTIVITAMIN ORAL Take by mouth.    nabumetone (RELAFEN) 750 MG tablet Take 1 tablet (750 mg total) by mouth 2 (two) times daily.    traMADoL (ULTRAM) 50 mg tablet Take 1 tablet (50 mg total) by mouth every 6 (six) hours as needed for Pain.    pregabalin (LYRICA) 50 MG capsule Take 1 capsule (50 mg total) by mouth every evening.     No current facility-administered medications for this visit.       REVIEW OF SYSTEMS:    GENERAL:  No weight loss, malaise or fevers.  HEENT:   No recent changes in vision or hearing  NECK:  Negative for lumps, no difficulty with swallowing.  RESPIRATORY:  Negative for cough, wheezing or shortness of breath, patient denies any recent URI.  CARDIOVASCULAR:  Negative for chest pain, leg swelling or palpitations.  GI:  Negative for abdominal discomfort, blood in stools or black stools or change in bowel habits.  MUSCULOSKELETAL:  See HPI.  SKIN:  Negative for lesions, rash, and itching.  PSYCH:  No mood disorder or recent psychosocial stressors.  Patients sleep is not disturbed secondary to pain.  HEMATOLOGY/LYMPHOLOGY:  Negative for prolonged bleeding, bruising easily or swollen nodes.  Patient is not currently taking any  "anti-coagulants  NEURO:   No history of headaches, syncope, paralysis, seizures or tremors.  All other reviewed and negative other than HPI.    OBJECTIVE:    /74   Pulse 90   Resp 19   Ht 5' 1" (1.549 m)   Wt 64.6 kg (142 lb 6.7 oz)   SpO2 100%   BMI 26.91 kg/m²     PHYSICAL EXAMINATION:    GENERAL: Well appearing, in no acute distress, alert and oriented x3.  PSYCH:  Mood and affect appropriate.  SKIN: Skin color, texture, turgor normal, no rashes or lesions.  HEAD/FACE:  Normocephalic, atraumatic. Cranial nerves grossly intact.    NECK:   - Did not perform pain to palpation over the cervical paraspinous muscles.   - Spurling  Did not perform.  - Did not perform pain with neck flexion, extension, or lateral flexion.     CV: RRR with palpation of the radial artery.  PULM: CTAB. No evidence of respiratory difficulty, symmetric chest rise.  GI:  Soft and non-tender.    BACK:   - No obvious deformity or signs of trauma, Normal lumbar lordotic curve  - Negative spinous process tenderness  - Negative paravertebral tenderness  - Negative pain to palpation over the facet joints of the lumbar spine.   - Negative QL / Iliac crest / Glut tenderness  - Slump test is Positive for radicular pain  - Slump test is Positive for back pain  - Supine Straight leg raising is Positive for radicular pain  - Supine Straight leg raising is Negative for back pain  - Lumbar ROM is normal in Flexion without pain  - Lumbar ROM is normal in Extension without pain  - Lumbar ROM is normal in Lateral Flexion without pain  - Did not perform Sustained Hip Flexion test (for discogenic pain)  - Positive Altered Gait, Posture  - Axial facet loading test Negative on the bilateral side(s)    SI Joint exam:  - Negative SI joint tenderness to palpation  - Wilner's sign Did not perform  - Yeoman's Test: Did not perform for SI joint pain indicating anterior SI ligament involvement. Did not perform for anterior thigh pain/paresthesia which " indicates femoral nerve stretch.  - Gaenslen's Test:Did not perform  - Finger Ruiz's Sign:Negative  - SI compression test:Did not perform  - SI distraction test:Did not perform  - Thigh Thrust: Did not perform  - SI Thrust: Did not perform    MUSKULOSKELETAL:    EXTREMITIES:   Hip Exam:  - Log Roll Did not perform  - FADIR Did not perform  - Stinchfield Did not perform  - Hip Scour Did not perform  - GTB Tenderness Positive    MUSCULOSKELETAL:  No atrophy or tone abnormalities are noted in the UE or LE.  No deformities, edema, or skin discoloration are noted on visible skin. Good capillary refill.    NEURO: Bilateral upper and lower extremity coordination and muscle stretch reflexes are physiologic and symmetric.      NEUROLOGICAL EXAM:  MENTAL STATUS: A x O x 3, good concentration, speech is fluent and goal directed  MEMORY: recent and remote are intact  CN: CN2-12 grossly intact  MOTOR: 5/5 in all muscle groups except left DF 4+/5  DTRs: 2+ intact symmetric  Sensation:    -no Loss of sensation in a left lower and right lower leg distribution.         [1]   Social History  Socioeconomic History    Marital status:    Tobacco Use    Smoking status: Every Day     Types: Vaping with nicotine    Smokeless tobacco: Never   Substance and Sexual Activity    Alcohol use: Yes     Alcohol/week: 2.0 standard drinks of alcohol     Types: 2 Glasses of wine per week     Comment: daily    Drug use: Never   Social History Narrative    ** Merged History Encounter **          Social Drivers of Health     Financial Resource Strain: Low Risk  (2/5/2025)    Overall Financial Resource Strain (CARDIA)     Difficulty of Paying Living Expenses: Not hard at all   Food Insecurity: No Food Insecurity (2/5/2025)    Hunger Vital Sign     Worried About Running Out of Food in the Last Year: Never true     Ran Out of Food in the Last Year: Never true   Transportation Needs: No Transportation Needs (1/11/2024)    PRAPARE - Transportation      Lack of Transportation (Medical): No     Lack of Transportation (Non-Medical): No   Physical Activity: Inactive (2/5/2025)    Exercise Vital Sign     Days of Exercise per Week: 0 days     Minutes of Exercise per Session: 0 min   Stress: Stress Concern Present (2/5/2025)    [employer] - Occupational Stress Questionnaire     Feeling of Stress : Very much   Housing Stability: Low Risk  (1/11/2024)    Housing Stability Vital Sign     Unable to Pay for Housing in the Last Year: No     Number of Places Lived in the Last Year: 1     Unstable Housing in the Last Year: No

## 2025-06-20 NOTE — TELEPHONE ENCOUNTER
Refill Decision Note   Irena Manpreet  is requesting a refill authorization.  Brief Assessment and Rationale for Refill:  Approve     Medication Therapy Plan:         Comments:     Note composed:1:10 PM 06/20/2025

## 2025-06-20 NOTE — TELEPHONE ENCOUNTER
No care due was identified.  [facility] Embedded Care Due Messages. Reference number: [id number].   6/20/2025 6:34:19 AM CDT

## 2025-06-20 NOTE — H&P (VIEW-ONLY)
Chronic Pain - New Consult    Referring Physician: Apollo Nolen DO    Date: 06/20/2025     Re: Irena Case  MR#: [id number]  YOB: 1975  Age: 50 y.o.    Chief Complaint:   Chief Complaint   Patient presents with    Back Pain     **This note is dictated using the M*Modal Fluency Direct word recognition program. There are word recognition mistakes that are occasionally missed on review. This note was generated with the assistance of ambient listening technology. Verbal consent was obtained by the patient and accompanying visitor(s) for the recording of patient appointment to facilitate this note. I attest to having reviewed and edited the generated note for accuracy, though some syntax or spelling errors may persist. Please contact the author of this note for any clarification.   **    ASSESSMENT: 50 y.o. year old female with left back/leg pain, consistent with     1. Lumbar radiculopathy  Ambulatory Referral/Consult to Physical Therapy    pregabalin (LYRICA) 50 MG capsule    Case Request Endoscopy: Left L4 + L5 Transforaminal Epidural Steroid Injections (ref: Hanyu)      2. Severe scoliosis  Ambulatory referral/consult to Pain Clinic    Ambulatory Referral/Consult to Physical Therapy    pregabalin (LYRICA) 50 MG capsule      3. Degeneration of intervertebral disc of lumbar region with discogenic back pain and lower extremity pain  Case Request Endoscopy: Left L4 + L5 Transforaminal Epidural Steroid Injections (ref: Hanyu)        **Assessment & Plan      LUMBAR RADICULOPATHY:  - Trial Pregabalin 50mg  - Discussed epidural injection for radicular pain.  - Explained injection site, medication (steroid and numbing medicine), expected outcomes, risks.  - Goal: 50% improvement in pain, lasting 3-6 months on average.  - Scheduled for epidural injection with Dr. Suarez next Wednesday, pending insurance approval.   - 6/24/25 - Left L4-5, L5-S1 TFALLY w/ Daniela- RN sed    Left leg weakness:  - Be  cautious of potential foot drop and tripping due to weakness in left foot.  - Referred to PT for strengthening exercises.    UNEQUAL LIMB LENGTH (ACQUIRED):  - Try shoe lift for left side to address leg length discrepancy.    - RTC after tfESI  - Counseled patient regarding the importance of weight loss and activity modification and physical therapy.    The above plan and management options were discussed at length with patient. Patient is in agreement with the above and verbalized understanding. It will be communicated with the referring physician via electronic record, fax, or mail.  Lab/study reports reviewed were important and necessary because subsequent medical and treatment recommendations required review of the above lab/study reports. Images viewed/reviewed above were important and necessary because subsequent medical and treatment recommendations required review of the reviewed image(s).     Electronically signed by:  Nakul Michael DO  06/20/2025    =========================================================================================================    SUBJECTIVE:    Irena Case is a 50 y.o. female presents to the clinic for the evaluation of back and leg pain.  Patient presents with left leg radicular pain that started approximately a month and a half ago, towards the end of the school year (late April or early May). Pain extends from her hip down to her ankle, primarily along the back of the thigh and into the foot, described as burning and shooting in nature. It is continuous when present, with good and bad days, worsening with walking and sitting, and occasionally waking her up at night. She cannot identify specific positions that alleviate or exacerbate the pain.    She reports weakness in the left leg, particularly noticeable during prolonged walking and when getting out of the shower without support. For pain management, she takes Nabumetone twice daily. Missing a dose led to  "increased pain for two days during a teaching conference in [place]. She has been prescribed gabapentin but does not take it due to excessive fatigue. Previous steroid injections were ineffective.    She experienced hip pain about a year ago and has scoliosis, which contributes to uneven hips. As a fifth-, she notes that the pain was particularly bad during the last few weeks of school. She typically sleeps 5-6 hours per night during the school year and 7-8 hours during the summer.    She denies any recent trauma or injury, urinary or bowel incontinence, or falls. She denies any history of back surgery or joint replacements.    PREVIOUS TREATMENTS:  Patient previously underwent physical therapy, which was not very helpful and was performed "a while ago". She also received steroid injections into the muscle without X-ray guidance, which did not provide any benefit.    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 Her leg pain started towards the end of the school year, around late April or early May. The pain was severe during the last few weeks of school, affecting her mobility and making prolonged walking difficult. She attended a teaching conference in [place] when the pain was particularly bad, challenging her ability to walk.     Pain Description:    At BEST  2/10   At WORST  9/10 on the WORST day.    On average pain is rated as 2/10.   Today the pain is rated as 3/10  The pain is intermittent.  The pain is described as burning and shooting    Symptoms interfere with daily activity and sleeping.   Exacerbating factors: not sure.    Mitigating factors nothing.   She reports 5-7 hours of sleep per night.    Physical Therapy/Home Exercise: No, not currently in physical therapy or home exercise program    Current Pain Medications:    - nabumetone BID    Failed Pain Medications:    - gabapentin (too much sedation)    Pain " Treatment Therapies:    Pain procedures:   none  Physical Therapy: years ago  Chiropractor: none  Acupuncture: none  TENS unit: none  Spinal decompression: none  Joint replacement: none    Patient reports significant motor weakness in the left leg  Patient denies any suicidal or homicidal ideations     report:  Reviewed and consistent with medication use as prescribed.    Imaging:   LUMBAR MRI  Results for orders placed during the hospital encounter of 06/02/25    MRI Lumbar Spine Without Contrast    Narrative  EXAMINATION:  MRI LUMBAR SPINE WITHOUT CONTRAST    CLINICAL HISTORY:  severe NF stenosis L4/5 left; Scoliosis, unspecified    TECHNIQUE:  Multiplanar, multisequence MR images were acquired from the thoracolumbar junction to the sacrum without the administration of contrast.    COMPARISON:  CT 05/22/2025.    FINDINGS:  Lower lumbar dextroscoliosis with 7 mm of left lateral listhesis of L3 on L4.  No spondylolysis.  Vertebral body heights are well maintained.  No acute fractures.  No marrow signal abnormality to suggest an infiltrative process.    Multilevel degenerative disc space narrowing desiccation most pronounced at L3-L4 and L4-L5.  Chronic degenerative endplate change at L3-L4 and L4-L5.  No endplate edema.    Distal spinal cord demonstrates normal contour and signal intensity.  Conus medullaris terminates at L1-L2.  Cauda equina is normal.  Right L1-L2 and multiple sacral perineural sleeve cysts.    Visualized intra-abdominal structures demonstrate no significant abnormalities.  Paraspinal musculature demonstrates normal bulk and signal intensity.  SI joints are symmetric.    T11-T12: No spinal canal stenosis or neural foraminal narrowing.    T12-L1: No spinal canal stenosis or neural foraminal narrowing.    L1-L2: No spinal canal stenosis or neural foraminal narrowing.    L2-L3: No spinal canal stenosis or neural foraminal narrowing.    L3-L4: Circumferential disc bulge.  Mild bilateral facet  arthropathy.  No spinal canal stenosis.  Mild left and moderate right neural foraminal narrowing.    L4-L5: Circumferential disc bulge.  Mild left facet arthropathy.  No spinal canal stenosis.  Severe left neural foraminal narrowing.    L5-S1: Left foraminal/extraforaminal broad-based disc bulge.  Mild bilateral facet arthropathy.  No spinal canal stenosis.  Mild left neural foraminal narrowing.    Impression  1. Multilevel lumbar spondylosis most pronounced at L3-L4 and L4-L5 as detailed above.      Electronically signed by: Khalif Gaviria MD  Date:    06/03/2025  Time:    11:44            6/20/2025     9:24 AM   Pain Disability Index (PDI)   Family/Home Responsibilities: 9   Recreation: 9   Occupation: 9   Sexual Behavior: 4   Self Care: 4   Life-Support Activities: 1   Pain Disability Index (PDI) 42        Past Medical History:   Diagnosis Date    Endometriosis     Fatty liver     Hypertension      Past Surgical History:   Procedure Laterality Date    APPENDECTOMY      BREAST BIOPSY Left     benign    COLONOSCOPY N/A 01/26/2024    Procedure: COLONOSCOPY;  Surgeon: Abi Mena MD;  Location: [facility] ENDO;  Service: Endoscopy;  Laterality: N/A;  10/9 ref by Sue Hansen MD, PEG, instr. to portal-st  1/19- lvm and portal msg for pc. DBM    EYE SURGERY      LAPAROSCOPIC APPENDECTOMY N/A 04/13/2023    Procedure: APPENDECTOMY, LAPAROSCOPIC;  Surgeon: Niall Benjamin MD;  Location: [facility] OR;  Service: General;  Laterality: N/A;    LASIK Bilateral     TOTAL ABDOMINAL HYSTERECTOMY W/ BILATERAL SALPINGOOPHORECTOMY  2020    Supracervical - done in [facility]     Social History[1]  Family History   Problem Relation Name Age of Onset    COPD Mother Kaitlynn     Asthma Mother Kaitlynn     Hypertension Mother Kaitlynn     Heart failure Mother Kaitlynn     Arthritis Mother Kaitlynn     Heart disease Father Luis         s/p quadruple bypass    Cirrhosis Neg Hx      Breast cancer Neg Hx      Ovarian cancer Neg Hx      Colon cancer Neg Hx          Review of patient's allergies indicates:  No Known Allergies    Current Outpatient Medications   Medication Sig    azelastine (ASTELIN) 137 mcg (0.1 %) nasal spray 1 spray (137 mcg total) by Nasal route 2 (two) times daily.    dextroamphetamine-amphetamine (ADDERALL XR) 20 MG 24 hr capsule Take 1 capsule (20 mg total) by mouth 2 (two) times a day.    EScitalopram oxalate (LEXAPRO) 20 MG tablet TAKE ONE TABLET BY MOUTH ONCE DAILY FOR MOOD Strength: 20 mg    fluticasone propionate (FLONASE) 50 mcg/actuation nasal spray SPRAY 2 SPRAYS BY EACH NOSTRIL ROUTE ONCE DAILY.    hydroCHLOROthiazide (HYDRODIURIL) 25 MG tablet TAKE ONE TABLET BY MOUTH EVERY DAY FOR FLUID AND BLOOD PRESSURE    lisinopriL (PRINIVIL,ZESTRIL) 40 MG tablet TAKE 1 TABLET BY MOUTH EVERY DAY    MULTIVITAMIN ORAL Take by mouth.    nabumetone (RELAFEN) 750 MG tablet Take 1 tablet (750 mg total) by mouth 2 (two) times daily.    traMADoL (ULTRAM) 50 mg tablet Take 1 tablet (50 mg total) by mouth every 6 (six) hours as needed for Pain.    pregabalin (LYRICA) 50 MG capsule Take 1 capsule (50 mg total) by mouth every evening.     No current facility-administered medications for this visit.       REVIEW OF SYSTEMS:    GENERAL:  No weight loss, malaise or fevers.  HEENT:   No recent changes in vision or hearing  NECK:  Negative for lumps, no difficulty with swallowing.  RESPIRATORY:  Negative for cough, wheezing or shortness of breath, patient denies any recent URI.  CARDIOVASCULAR:  Negative for chest pain, leg swelling or palpitations.  GI:  Negative for abdominal discomfort, blood in stools or black stools or change in bowel habits.  MUSCULOSKELETAL:  See HPI.  SKIN:  Negative for lesions, rash, and itching.  PSYCH:  No mood disorder or recent psychosocial stressors.  Patients sleep is not disturbed secondary to pain.  HEMATOLOGY/LYMPHOLOGY:  Negative for prolonged bleeding, bruising easily or swollen nodes.  Patient is not currently taking any  "anti-coagulants  NEURO:   No history of headaches, syncope, paralysis, seizures or tremors.  All other reviewed and negative other than HPI.    OBJECTIVE:    /74   Pulse 90   Resp 19   Ht 5' 1" (1.549 m)   Wt 64.6 kg (142 lb 6.7 oz)   SpO2 100%   BMI 26.91 kg/m²     PHYSICAL EXAMINATION:    GENERAL: Well appearing, in no acute distress, alert and oriented x3.  PSYCH:  Mood and affect appropriate.  SKIN: Skin color, texture, turgor normal, no rashes or lesions.  HEAD/FACE:  Normocephalic, atraumatic. Cranial nerves grossly intact.    NECK:   - Did not perform pain to palpation over the cervical paraspinous muscles.   - Spurling  Did not perform.  - Did not perform pain with neck flexion, extension, or lateral flexion.     CV: RRR with palpation of the radial artery.  PULM: CTAB. No evidence of respiratory difficulty, symmetric chest rise.  GI:  Soft and non-tender.    BACK:   - No obvious deformity or signs of trauma, Normal lumbar lordotic curve  - Negative spinous process tenderness  - Negative paravertebral tenderness  - Negative pain to palpation over the facet joints of the lumbar spine.   - Negative QL / Iliac crest / Glut tenderness  - Slump test is Positive for radicular pain  - Slump test is Positive for back pain  - Supine Straight leg raising is Positive for radicular pain  - Supine Straight leg raising is Negative for back pain  - Lumbar ROM is normal in Flexion without pain  - Lumbar ROM is normal in Extension without pain  - Lumbar ROM is normal in Lateral Flexion without pain  - Did not perform Sustained Hip Flexion test (for discogenic pain)  - Positive Altered Gait, Posture  - Axial facet loading test Negative on the bilateral side(s)    SI Joint exam:  - Negative SI joint tenderness to palpation  - Wilner's sign Did not perform  - Yeoman's Test: Did not perform for SI joint pain indicating anterior SI ligament involvement. Did not perform for anterior thigh pain/paresthesia which " indicates femoral nerve stretch.  - Gaenslen's Test:Did not perform  - Finger Ruiz's Sign:Negative  - SI compression test:Did not perform  - SI distraction test:Did not perform  - Thigh Thrust: Did not perform  - SI Thrust: Did not perform    MUSKULOSKELETAL:    EXTREMITIES:   Hip Exam:  - Log Roll Did not perform  - FADIR Did not perform  - Stinchfield Did not perform  - Hip Scour Did not perform  - GTB Tenderness Positive    MUSCULOSKELETAL:  No atrophy or tone abnormalities are noted in the UE or LE.  No deformities, edema, or skin discoloration are noted on visible skin. Good capillary refill.    NEURO: Bilateral upper and lower extremity coordination and muscle stretch reflexes are physiologic and symmetric.      NEUROLOGICAL EXAM:  MENTAL STATUS: A x O x 3, good concentration, speech is fluent and goal directed  MEMORY: recent and remote are intact  CN: CN2-12 grossly intact  MOTOR: 5/5 in all muscle groups except left DF 4+/5  DTRs: 2+ intact symmetric  Sensation:    -no Loss of sensation in a left lower and right lower leg distribution.         [1]   Social History  Socioeconomic History    Marital status:    Tobacco Use    Smoking status: Every Day     Types: Vaping with nicotine    Smokeless tobacco: Never   Substance and Sexual Activity    Alcohol use: Yes     Alcohol/week: 2.0 standard drinks of alcohol     Types: 2 Glasses of wine per week     Comment: daily    Drug use: Never   Social History Narrative    ** Merged History Encounter **          Social Drivers of Health     Financial Resource Strain: Low Risk  (2/5/2025)    Overall Financial Resource Strain (CARDIA)     Difficulty of Paying Living Expenses: Not hard at all   Food Insecurity: No Food Insecurity (2/5/2025)    Hunger Vital Sign     Worried About Running Out of Food in the Last Year: Never true     Ran Out of Food in the Last Year: Never true   Transportation Needs: No Transportation Needs (1/11/2024)    PRAPARE - Transportation      Lack of Transportation (Medical): No     Lack of Transportation (Non-Medical): No   Physical Activity: Inactive (2/5/2025)    Exercise Vital Sign     Days of Exercise per Week: 0 days     Minutes of Exercise per Session: 0 min   Stress: Stress Concern Present (2/5/2025)    [employer] - Occupational Stress Questionnaire     Feeling of Stress : Very much   Housing Stability: Low Risk  (1/11/2024)    Housing Stability Vital Sign     Unable to Pay for Housing in the Last Year: No     Number of Places Lived in the Last Year: 1     Unstable Housing in the Last Year: No

## 2025-06-20 NOTE — PROGRESS NOTES
Ms. Case will be scheduled for left L4 + L5 TF ALLY with Dr. Suarez on 6/25/2025- checking in at 1:15pm.  Reviewed the pre-procedure instructions listed below with the patient- copy also provided.  Instructed patient to notify clinic if she begins feeling ill, runs a fever, is prescribed antibiotics, and/or has any outpatient procedures (colonoscopy, endoscopy, OBGYN, dental work, etc.), and/or any vaccinations or booster shots within the two weeks leading up to this procedure.  Instructed patient to report to [facility], 2nd Floor Endoscopy Registration Desk.  All questions answered- patient verbalized understanding.      Day of Procedure  Ensure you have obtained an arrival time from the Pain Management Staff  You will be contacted the week before your scheduled date to review these instructions and receive your arrival time.  Please check any voicemails received.  If you arrive past your scheduled procedure time, you may be asked to reschedule your procedure.     Ensure you have a  with you.  If you arrive without a responsible adult to stay with you and drive you home, you may be asked to reschedule your procedure.      Take all of your prescribed medications that you routinely take for blood pressure, heart medications, thyroid, cholesterol, etc.  You will be informed if blood thinners should be held.     This is NOT a fasting procedure, you may eat the day of your procedure.     Wear comfortable clothing (loose fitting pants).    You may wear glasses, dentures, contact lenses, and/or hearing aids.      Notify the nurse during the intake process if you are allergic to any medications, if you are diabetic, or if you are not feeling well        Diabetic Patients  Please check your blood sugar prior to coming in for your procedure.  If the value is greater than 200, contact the clinic [phone] as the procedure may need to be rescheduled.  The procedure may not be performed if your  blood sugar is above 200 even after checking into the hospital.        IF you are taking blood thinners, please make sure our staff is aware so that we can obtain clearance and have you hold the medication accordingly.

## 2025-06-23 ENCOUNTER — TELEPHONE (OUTPATIENT)
Dept: PAIN MEDICINE | Facility: CLINIC | Age: 50
End: 2025-06-23
Payer: OTHER GOVERNMENT

## 2025-06-23 ENCOUNTER — PATIENT MESSAGE (OUTPATIENT)
Dept: PAIN MEDICINE | Facility: CLINIC | Age: 50
End: 2025-06-23
Payer: OTHER GOVERNMENT

## 2025-06-23 NOTE — TELEPHONE ENCOUNTER
Reviewed pre-procedure instructions with  Irena, as well as provided arrival time of 1:15p for 6/25/2025.  All questions answered- verbalized understanding.

## 2025-06-23 NOTE — TELEPHONE ENCOUNTER
Copied from CRM #[id number]. Topic: Appointments - Appointment Confirmation  >> Jun 23, 2025 12:57 PM Marsha wrote:  .Type: Patient Call Back    Who called: Self     What is the request in detail: asked for a call back to get arrival time for procedure because she needs to know for her ride.     Can the clinic reply by [facility]? No     Would the patient rather a call back or a response via [facility]? Call     Best call back number: .[phone]      Additional Information:

## 2025-06-24 ENCOUNTER — PATIENT MESSAGE (OUTPATIENT)
Dept: FAMILY MEDICINE | Facility: CLINIC | Age: 50
End: 2025-06-24
Payer: OTHER GOVERNMENT

## 2025-06-24 DIAGNOSIS — Z12.31 BREAST CANCER SCREENING BY MAMMOGRAM: Primary | ICD-10-CM

## 2025-06-25 ENCOUNTER — HOSPITAL ENCOUNTER (OUTPATIENT)
Facility: HOSPITAL | Age: 50
Discharge: HOME | End: 2025-06-25
Attending: PAIN MEDICINE | Admitting: PAIN MEDICINE
Payer: OTHER GOVERNMENT

## 2025-06-25 VITALS
HEART RATE: 82 BPM | OXYGEN SATURATION: 99 % | SYSTOLIC BLOOD PRESSURE: 112 MMHG | TEMPERATURE: 99 F | RESPIRATION RATE: 17 BRPM | DIASTOLIC BLOOD PRESSURE: 77 MMHG

## 2025-06-25 DIAGNOSIS — M54.16 LUMBAR RADICULOPATHY: Primary | ICD-10-CM

## 2025-06-25 PROCEDURE — 63600175 PHARM REV CODE 636 W HCPCS: Performed by: PAIN MEDICINE

## 2025-06-25 PROCEDURE — 25500020 PHARM REV CODE 255: Performed by: PAIN MEDICINE

## 2025-06-25 PROCEDURE — 64483 NJX AA&/STRD TFRM EPI L/S 1: CPT | Mod: LT | Performed by: PAIN MEDICINE

## 2025-06-25 PROCEDURE — 64483 NJX AA&/STRD TFRM EPI L/S 1: CPT | Mod: LT,,, | Performed by: PAIN MEDICINE

## 2025-06-25 RX ORDER — DEXAMETHASONE SODIUM PHOSPHATE 10 MG/ML
10 INJECTION, SOLUTION INTRA-ARTICULAR; INTRALESIONAL; INTRAMUSCULAR; INTRAVENOUS; SOFT TISSUE ONCE
Status: COMPLETED | OUTPATIENT
Start: 2025-06-25 | End: 2025-06-25

## 2025-06-25 RX ORDER — DEXAMETHASONE SODIUM PHOSPHATE 10 MG/ML
INJECTION, SOLUTION INTRA-ARTICULAR; INTRALESIONAL; INTRAMUSCULAR; INTRAVENOUS; SOFT TISSUE
Status: DISCONTINUED
Start: 2025-06-25 | End: 2025-06-25 | Stop reason: HOSPADM

## 2025-06-25 RX ORDER — LIDOCAINE HYDROCHLORIDE 10 MG/ML
20 INJECTION, SOLUTION INFILTRATION; PERINEURAL ONCE
Status: COMPLETED | OUTPATIENT
Start: 2025-06-25 | End: 2025-06-25

## 2025-06-25 RX ORDER — ALPRAZOLAM 0.5 MG/1
1 TABLET, ORALLY DISINTEGRATING ORAL ONCE AS NEEDED
Status: DISCONTINUED | OUTPATIENT
Start: 2025-06-25 | End: 2025-06-25 | Stop reason: HOSPADM

## 2025-06-25 RX ORDER — LIDOCAINE HYDROCHLORIDE 10 MG/ML
INJECTION, SOLUTION INFILTRATION; PERINEURAL
Status: DISCONTINUED
Start: 2025-06-25 | End: 2025-06-25 | Stop reason: HOSPADM

## 2025-06-25 RX ORDER — LIDOCAINE HYDROCHLORIDE 10 MG/ML
INJECTION, SOLUTION EPIDURAL; INFILTRATION; INTRACAUDAL; PERINEURAL
Status: DISCONTINUED
Start: 2025-06-25 | End: 2025-06-25 | Stop reason: HOSPADM

## 2025-06-25 RX ORDER — LIDOCAINE HYDROCHLORIDE 10 MG/ML
1 INJECTION, SOLUTION EPIDURAL; INFILTRATION; INTRACAUDAL; PERINEURAL ONCE
Status: COMPLETED | OUTPATIENT
Start: 2025-06-25 | End: 2025-06-25

## 2025-06-25 NOTE — DISCHARGE SUMMARY
[facility] [place] - Pain Management  Discharge Note  Short Stay    Procedure(s) (LRB):  Left L4 + L5 Transforaminal Epidural Steroid Injections (ref: Hanyu) (Left)      OUTCOME: Patient tolerated treatment/procedure well without complication and is now ready for discharge.    DISPOSITION: Home or Self Care    FINAL DIAGNOSIS:  <principal problem not specified>    FOLLOWUP: In clinic    DISCHARGE INSTRUCTIONS:  No discharge procedures on file.       Discharge Diagnosis:Lumbar radiculopathy [M54.16]  Degeneration of intervertebral disc of lumbar region with discogenic back pain and lower extremity pain [M51.362]  Condition on Discharge: Stable.  Diet on Discharge: Same as before.  Activity: as per instruction sheet.  Discharge to: Home with a responsible adult.  Follow up: as per Discharge instructions

## 2025-06-25 NOTE — OP NOTE
Lumbar transforaminal ALLY    Time-out taken to identify patient and procedure side prior to starting the procedure.   I attest that I have reviewed the patient's home medications prior to the procedure and no contraindication have been identified. I  re-evaluated the patient after the patient was positioned for the procedure in the procedure room immediately before the procedural time-out. The vital signs are current and represent the current state of the patient which has not significantly changed since the preprocedure assessment.                              Date of Service: 06/25/2025    PCP: Sue Hansen MD    Referring Physician:                                   PROCEDURE: Left L4 and L5 transforaminal epidural steroid injection under fluoroscopy    REASON FOR PROCEDURE: Lumbar Radiculopathy    PHYSICIAN: Candelario Suarez MD    ASSISTANTS:None    MEDICATIONS INJECTED:  Preservative-free dexamethasone 10mg, Xylocaine 1% MPF 3-5ml. 3ml of the mixture per level.     LOCAL ANESTHETIC INJECTED:  Xylocaine 1% 3ml per site.    SEDATION MEDICATIONS: None    ESTIMATED BLOOD LOSS:  None.    COMPLICATIONS:  None.    TECHNIQUE:   Laying in a prone position, the patient was prepped and draped in the usual sterile fashion using ChloraPrep and fenestrated drape.  The area to be injected was determined under fluoroscopic guidance.  Local anesthetic was given by raising a wheal and going down to the hub of a 27-gauge 1.25 inch needle.  The 4.75 inch 25-gauge spinal needle was introduced towards the transverse process of each above named nerve root level.  The needle was walked medially then hinged into the neural foramen.  Omnipaque was injected to confirm appropriate placement and that there was no vascular runoff.  The medication was then injected after applying negative pressure. The patient tolerated the procedure well.    PAIN BEFORE THE PROCEDURE: 2/10.    PAIN AFTER THE PROCEDURE: 0/10.    The patient was  monitored after the procedure.  Patient was given post procedure and discharge instructions to follow at home.  We will see the patient back in two weeks or the patient may call to inform of status. The patient was discharged in a stable condition.

## 2025-06-25 NOTE — DISCHARGE INSTRUCTIONS

## 2025-06-26 ENCOUNTER — PATIENT MESSAGE (OUTPATIENT)
Dept: PSYCHIATRY | Facility: CLINIC | Age: 50
End: 2025-06-26
Payer: OTHER GOVERNMENT

## 2025-06-26 DIAGNOSIS — F90.9 ATTENTION DEFICIT HYPERACTIVITY DISORDER (ADHD), UNSPECIFIED ADHD TYPE: ICD-10-CM

## 2025-06-27 RX ORDER — DEXTROAMPHETAMINE SACCHARATE, AMPHETAMINE ASPARTATE MONOHYDRATE, DEXTROAMPHETAMINE SULFATE AND AMPHETAMINE SULFATE 5; 5; 5; 5 MG/1; MG/1; MG/1; MG/1
20 CAPSULE, EXTENDED RELEASE ORAL 2 TIMES DAILY
Qty: 60 CAPSULE | Refills: 0 | Status: SHIPPED | OUTPATIENT
Start: 2025-06-27

## 2025-07-07 NOTE — LETTER
April 9, 2024      [facility]  [address], [address]  [place] [address]  Phone: [phone]  Fax: [phone]       Patient: Irena Case   YOB: 1975  Date of Visit: 04/09/2024    To Whom It May Concern:    Anam Case  was at [facility] on 04/09/2024. The patient may return to work on 4/15/24 with no restrictions. If you have any questions or concerns, or if I can be of further assistance, please do not hesitate to contact me.    Sincerely,          Alysa Rodríguez NP      1 person assist

## 2025-07-11 DIAGNOSIS — M41.9 SCOLIOSIS, UNSPECIFIED SCOLIOSIS TYPE, UNSPECIFIED SPINAL REGION: ICD-10-CM

## 2025-07-11 DIAGNOSIS — R20.2 PARESTHESIA AND PAIN OF LEFT EXTREMITY: ICD-10-CM

## 2025-07-11 DIAGNOSIS — M79.609 PARESTHESIA AND PAIN OF LEFT EXTREMITY: ICD-10-CM

## 2025-07-11 RX ORDER — NABUMENTONE 750 MG/1
750 TABLET ORAL 2 TIMES DAILY
Qty: 60 TABLET | Refills: 1 | Status: SHIPPED | OUTPATIENT
Start: 2025-07-11

## 2025-07-11 NOTE — TELEPHONE ENCOUNTER
Refill Routing Note   Medication(s) are not appropriate for processing by [facility] for the following reason(s):        Outside of protocol    [facility] action(s):  Route             Appointments  past 12m or future 3m with PCP    Date Provider   Last Visit   5/14/2025 Mora Venegas, TILA   Next Visit   Visit date not found Mora Venegas NP   ED visits in past 90 days: 0        Note composed:10:25 AM 07/11/2025

## 2025-07-14 PROBLEM — R29.898 DECREASED STRENGTH OF TRUNK AND BACK: Status: ACTIVE | Noted: 2025-07-14

## 2025-07-22 ENCOUNTER — TELEPHONE (OUTPATIENT)
Dept: NEUROSURGERY | Facility: CLINIC | Age: 50
End: 2025-07-22
Payer: OTHER GOVERNMENT

## 2025-07-24 ENCOUNTER — TELEPHONE (OUTPATIENT)
Dept: NEUROSURGERY | Facility: CLINIC | Age: 50
End: 2025-07-24
Payer: OTHER GOVERNMENT

## 2025-07-29 ENCOUNTER — PATIENT MESSAGE (OUTPATIENT)
Dept: PSYCHIATRY | Facility: CLINIC | Age: 50
End: 2025-07-29
Payer: OTHER GOVERNMENT

## 2025-08-04 ENCOUNTER — OFFICE VISIT (OUTPATIENT)
Dept: NEUROSURGERY | Facility: CLINIC | Age: 50
End: 2025-08-04
Payer: OTHER GOVERNMENT

## 2025-08-04 DIAGNOSIS — M54.16 CHRONIC LEFT-SIDED LUMBAR RADICULOPATHY: Primary | ICD-10-CM

## 2025-08-04 PROCEDURE — 99214 OFFICE O/P EST MOD 30 MIN: CPT | Mod: S$PBB,,, | Performed by: STUDENT IN AN ORGANIZED HEALTH CARE EDUCATION/TRAINING PROGRAM

## 2025-08-04 PROCEDURE — 99212 OFFICE O/P EST SF 10 MIN: CPT | Mod: PBBFAC | Performed by: STUDENT IN AN ORGANIZED HEALTH CARE EDUCATION/TRAINING PROGRAM

## 2025-08-04 PROCEDURE — 99999 PR PBB SHADOW E&M-EST. PATIENT-LVL II: CPT | Mod: PBBFAC,,, | Performed by: STUDENT IN AN ORGANIZED HEALTH CARE EDUCATION/TRAINING PROGRAM

## 2025-08-11 ENCOUNTER — OFFICE VISIT (OUTPATIENT)
Dept: PSYCHIATRY | Facility: CLINIC | Age: 50
End: 2025-08-11
Payer: OTHER GOVERNMENT

## 2025-08-11 ENCOUNTER — OFFICE VISIT (OUTPATIENT)
Dept: FAMILY MEDICINE | Facility: CLINIC | Age: 50
End: 2025-08-11
Payer: OTHER GOVERNMENT

## 2025-08-11 VITALS
SYSTOLIC BLOOD PRESSURE: 130 MMHG | RESPIRATION RATE: 16 BRPM | OXYGEN SATURATION: 98 % | HEIGHT: 61 IN | WEIGHT: 143.5 LBS | TEMPERATURE: 99 F | BODY MASS INDEX: 27.09 KG/M2 | HEART RATE: 80 BPM | DIASTOLIC BLOOD PRESSURE: 76 MMHG

## 2025-08-11 DIAGNOSIS — F90.9 ATTENTION DEFICIT HYPERACTIVITY DISORDER (ADHD), UNSPECIFIED ADHD TYPE: ICD-10-CM

## 2025-08-11 DIAGNOSIS — R39.9 UTI SYMPTOMS: Primary | ICD-10-CM

## 2025-08-11 LAB
BILIRUB UR QL STRIP.AUTO: NEGATIVE
CLARITY UR: CLEAR
COLOR UR AUTO: YELLOW
GLUCOSE UR QL STRIP: NEGATIVE
HGB UR QL STRIP: NEGATIVE
HYALINE CASTS UR QL AUTO: 0 /LPF (ref 0–1)
KETONES UR QL STRIP: NEGATIVE
LEUKOCYTE ESTERASE UR QL STRIP: ABNORMAL
MICROSCOPIC COMMENT: NORMAL
NITRITE UR QL STRIP: NEGATIVE
PH UR STRIP: 6 [PH]
PROT UR QL STRIP: NEGATIVE
RBC #/AREA URNS AUTO: 1 /HPF (ref 0–4)
SP GR UR STRIP: 1.01
SQUAMOUS #/AREA URNS AUTO: 2 /HPF
UROBILINOGEN UR STRIP-ACNC: NEGATIVE EU/DL
WBC #/AREA URNS AUTO: 3 /HPF (ref 0–5)

## 2025-08-11 PROCEDURE — 99214 OFFICE O/P EST MOD 30 MIN: CPT | Mod: PBBFAC,PO | Performed by: NURSE PRACTITIONER

## 2025-08-11 PROCEDURE — 99999 PR PBB SHADOW E&M-EST. PATIENT-LVL IV: CPT | Mod: PBBFAC,,, | Performed by: NURSE PRACTITIONER

## 2025-08-11 PROCEDURE — G2211 COMPLEX E/M VISIT ADD ON: HCPCS | Mod: 95,,,

## 2025-08-11 PROCEDURE — 87086 URINE CULTURE/COLONY COUNT: CPT | Performed by: NURSE PRACTITIONER

## 2025-08-11 PROCEDURE — 98007 SYNCH AUDIO-VIDEO EST HI 40: CPT | Mod: 95,,,

## 2025-08-11 PROCEDURE — 81001 URINALYSIS AUTO W/SCOPE: CPT | Performed by: NURSE PRACTITIONER

## 2025-08-11 PROCEDURE — 99214 OFFICE O/P EST MOD 30 MIN: CPT | Mod: S$PBB,,, | Performed by: NURSE PRACTITIONER

## 2025-08-11 RX ORDER — CEPHALEXIN 500 MG/1
500 CAPSULE ORAL EVERY 12 HOURS
Qty: 14 CAPSULE | Refills: 0 | Status: SHIPPED | OUTPATIENT
Start: 2025-08-11

## 2025-08-11 RX ORDER — DEXTROAMPHETAMINE SACCHARATE, AMPHETAMINE ASPARTATE MONOHYDRATE, DEXTROAMPHETAMINE SULFATE AND AMPHETAMINE SULFATE 7.5; 7.5; 7.5; 7.5 MG/1; MG/1; MG/1; MG/1
30 CAPSULE, EXTENDED RELEASE ORAL EVERY MORNING
Qty: 30 CAPSULE | Refills: 0 | Status: SHIPPED | OUTPATIENT
Start: 2025-08-11 | End: 2025-08-19

## 2025-08-11 RX ORDER — DEXTROAMPHETAMINE SACCHARATE, AMPHETAMINE ASPARTATE MONOHYDRATE, DEXTROAMPHETAMINE SULFATE AND AMPHETAMINE SULFATE 5; 5; 5; 5 MG/1; MG/1; MG/1; MG/1
20 CAPSULE, EXTENDED RELEASE ORAL DAILY
Qty: 30 CAPSULE | Refills: 0 | Status: SHIPPED | OUTPATIENT
Start: 2025-08-11 | End: 2025-08-19

## 2025-08-13 LAB — BACTERIA UR CULT: NORMAL

## 2025-08-15 ENCOUNTER — PATIENT MESSAGE (OUTPATIENT)
Dept: PSYCHIATRY | Facility: CLINIC | Age: 50
End: 2025-08-15
Payer: OTHER GOVERNMENT

## 2025-08-15 DIAGNOSIS — F90.9 ATTENTION DEFICIT HYPERACTIVITY DISORDER (ADHD), UNSPECIFIED ADHD TYPE: ICD-10-CM

## 2025-08-19 ENCOUNTER — TELEPHONE (OUTPATIENT)
Dept: PSYCHIATRY | Facility: CLINIC | Age: 50
End: 2025-08-19
Payer: OTHER GOVERNMENT

## 2025-08-19 ENCOUNTER — PATIENT MESSAGE (OUTPATIENT)
Dept: PSYCHIATRY | Facility: CLINIC | Age: 50
End: 2025-08-19
Payer: OTHER GOVERNMENT

## 2025-08-19 DIAGNOSIS — F90.9 ATTENTION DEFICIT HYPERACTIVITY DISORDER (ADHD), UNSPECIFIED ADHD TYPE: Primary | ICD-10-CM

## 2025-08-19 DIAGNOSIS — F90.9 ATTENTION DEFICIT HYPERACTIVITY DISORDER (ADHD), UNSPECIFIED ADHD TYPE: ICD-10-CM

## 2025-08-19 RX ORDER — DEXTROAMPHETAMINE SACCHARATE, AMPHETAMINE ASPARTATE MONOHYDRATE, DEXTROAMPHETAMINE SULFATE AND AMPHETAMINE SULFATE 7.5; 7.5; 7.5; 7.5 MG/1; MG/1; MG/1; MG/1
30 CAPSULE, EXTENDED RELEASE ORAL 2 TIMES DAILY
Qty: 60 CAPSULE | Refills: 0 | Status: CANCELLED | OUTPATIENT
Start: 2025-08-19

## 2025-08-19 RX ORDER — DEXTROAMPHETAMINE SACCHARATE, AMPHETAMINE ASPARTATE MONOHYDRATE, DEXTROAMPHETAMINE SULFATE AND AMPHETAMINE SULFATE 7.5; 7.5; 7.5; 7.5 MG/1; MG/1; MG/1; MG/1
30 CAPSULE, EXTENDED RELEASE ORAL 2 TIMES DAILY
Qty: 60 CAPSULE | Refills: 0 | Status: SHIPPED | OUTPATIENT
Start: 2025-08-19 | End: 2025-08-20

## 2025-08-19 RX ORDER — DEXTROAMPHETAMINE SACCHARATE, AMPHETAMINE ASPARTATE MONOHYDRATE, DEXTROAMPHETAMINE SULFATE AND AMPHETAMINE SULFATE 7.5; 7.5; 7.5; 7.5 MG/1; MG/1; MG/1; MG/1
30 CAPSULE, EXTENDED RELEASE ORAL 2 TIMES DAILY
Qty: 60 CAPSULE | Refills: 0 | Status: SHIPPED | OUTPATIENT
Start: 2025-08-19 | End: 2025-08-19 | Stop reason: SDUPTHER

## 2025-08-20 RX ORDER — DEXTROAMPHETAMINE SACCHARATE, AMPHETAMINE ASPARTATE MONOHYDRATE, DEXTROAMPHETAMINE SULFATE AND AMPHETAMINE SULFATE 7.5; 7.5; 7.5; 7.5 MG/1; MG/1; MG/1; MG/1
30 CAPSULE, EXTENDED RELEASE ORAL EVERY MORNING
Qty: 30 CAPSULE | Refills: 0 | Status: SHIPPED | OUTPATIENT
Start: 2025-08-20

## (undated) DEVICE — RELOAD ECHELON FLEX WHT 60MM

## (undated) DEVICE — TROCAR ENDOPATH XCEL 5X100MM

## (undated) DEVICE — TROCAR KII BLLN 12MM 10CM

## (undated) DEVICE — TRAY FOLEY 16FR INFECTION CONT

## (undated) DEVICE — COVER OVERHEAD SURG LT BLUE

## (undated) DEVICE — PACK ENDOSCOPY GENERAL

## (undated) DEVICE — BAG TISS RETRV MONARCH 10MM

## (undated) DEVICE — TUBING INSUFFLATION 10

## (undated) DEVICE — KITTNER ENDOSCOPIC SINGLE TIP

## (undated) DEVICE — APPLICATOR CHLORAPREP ORN 26ML

## (undated) DEVICE — SOL IRR SOD CHL .9% POUR

## (undated) DEVICE — BLANKET UPPER BODY 78.7X29.9IN

## (undated) DEVICE — RELOAD ECHELON FLEX BLU 60MM

## (undated) DEVICE — SUT MONOCRYL 4-0 PS-2

## (undated) DEVICE — UNDERGLOVES BIOGEL PI SIZE 7.5

## (undated) DEVICE — GLOVE SURGICAL LATEX SZ 7

## (undated) DEVICE — Device

## (undated) DEVICE — ELECTRODE REM POLYHESIVE II

## (undated) DEVICE — NDL HYPO REG 25G X 1 1/2

## (undated) DEVICE — SYR 10CC LUER LOCK

## (undated) DEVICE — ELECTRODE REM PLYHSV RETURN 9

## (undated) DEVICE — ADHESIVE DERMABOND MINI HV

## (undated) DEVICE — SUT ETHIBOND 0 CR/CT-2 8-18

## (undated) DEVICE — KIT ANTIFOG

## (undated) DEVICE — STAPLER ECHELON FLEX GST 60MM